# Patient Record
Sex: MALE | Race: WHITE | NOT HISPANIC OR LATINO | Employment: OTHER
[De-identification: names, ages, dates, MRNs, and addresses within clinical notes are randomized per-mention and may not be internally consistent; named-entity substitution may affect disease eponyms.]

---

## 2017-05-25 ENCOUNTER — LAB SERVICES (OUTPATIENT)
Dept: OTHER | Age: 82
End: 2017-05-25

## 2017-05-26 LAB
CHOLEST SERPL-MCNC: 171 MG/DL
CHOLEST/HDLC SERPL: 2.4
HDLC SERPL-MCNC: 72 MG/DL
LDLC SERPL CALC-MCNC: 74 MG/DL
LENGTH OF FAST TIME PATIENT: NORMAL HRS
NONHDLC SERPL-MCNC: 99 MG/DL
TRIGL SERPL-MCNC: 125 MG/DL

## 2017-06-01 ENCOUNTER — CHARTING TRANS (OUTPATIENT)
Dept: OTHER | Age: 82
End: 2017-06-01

## 2017-09-08 PROBLEM — Z85.51 HISTORY OF BLADDER CANCER: Status: ACTIVE | Noted: 2017-09-08

## 2017-09-08 PROCEDURE — 81015 MICROSCOPIC EXAM OF URINE: CPT | Performed by: UROLOGY

## 2017-10-03 ENCOUNTER — CHARTING TRANS (OUTPATIENT)
Dept: OTHER | Age: 82
End: 2017-10-03

## 2017-11-02 ENCOUNTER — HOSPITAL (OUTPATIENT)
Dept: OTHER | Age: 82
End: 2017-11-02
Attending: EMERGENCY MEDICINE

## 2018-01-08 PROBLEM — M75.81 RIGHT ROTATOR CUFF TENDONITIS: Status: ACTIVE | Noted: 2018-01-08

## 2018-01-08 PROBLEM — M75.122 COMPLETE TEAR OF LEFT ROTATOR CUFF: Status: ACTIVE | Noted: 2018-01-08

## 2018-01-22 ENCOUNTER — LAB SERVICES (OUTPATIENT)
Dept: OTHER | Age: 83
End: 2018-01-22

## 2018-01-23 ENCOUNTER — CHARTING TRANS (OUTPATIENT)
Dept: OTHER | Age: 83
End: 2018-01-23

## 2018-01-23 LAB
ALBUMIN SERPL-MCNC: 3.7 G/DL (ref 3.6–5.1)
ALBUMIN/GLOB SERPL: 1.2 (ref 1–2.4)
ALP SERPL-CCNC: 50 UNITS/L (ref 45–117)
ALT SERPL-CCNC: 24 UNITS/L
ANION GAP SERPL CALC-SCNC: 12 MMOL/L (ref 10–20)
AST SERPL-CCNC: 19 UNITS/L
BILIRUB SERPL-MCNC: 0.7 MG/DL (ref 0.2–1)
BUN SERPL-MCNC: 25 MG/DL (ref 6–20)
BUN/CREAT SERPL: 25 (ref 7–25)
CALCIUM SERPL-MCNC: 9.2 MG/DL (ref 8.4–10.2)
CHLORIDE SERPL-SCNC: 106 MMOL/L (ref 98–107)
CO2 SERPL-SCNC: 28 MMOL/L (ref 21–32)
CREAT SERPL-MCNC: 0.98 MG/DL (ref 0.67–1.17)
GLOBULIN SER-MCNC: 3.1 G/DL (ref 2–4)
GLUCOSE SERPL-MCNC: 101 MG/DL (ref 65–99)
LENGTH OF FAST TIME PATIENT: ABNORMAL HRS
POTASSIUM SERPL-SCNC: 4.5 MMOL/L (ref 3.4–5.1)
SODIUM SERPL-SCNC: 141 MMOL/L (ref 135–145)
TOTAL PROTEIN: 6.8 G/DL (ref 6.4–8.2)

## 2018-02-01 ENCOUNTER — CHARTING TRANS (OUTPATIENT)
Dept: OTHER | Age: 83
End: 2018-02-01

## 2018-04-04 ENCOUNTER — HOSPITAL (OUTPATIENT)
Dept: OTHER | Age: 83
End: 2018-04-04
Attending: EMERGENCY MEDICINE

## 2018-04-04 ENCOUNTER — LAB SERVICES (OUTPATIENT)
Dept: OTHER | Age: 83
End: 2018-04-04

## 2018-04-04 ENCOUNTER — IMAGING SERVICES (OUTPATIENT)
Dept: OTHER | Age: 83
End: 2018-04-04

## 2018-04-05 LAB
FLUAV AG SPEC QL IA: NEGATIVE
FLUBV AG SPEC QL IF: POSITIVE
SPECIMEN SOURCE: ABNORMAL

## 2018-07-03 ENCOUNTER — LAB SERVICES (OUTPATIENT)
Dept: OTHER | Age: 83
End: 2018-07-03

## 2018-07-03 ENCOUNTER — IMAGING SERVICES (OUTPATIENT)
Dept: OTHER | Age: 83
End: 2018-07-03

## 2018-07-03 ENCOUNTER — HOSPITAL (OUTPATIENT)
Dept: OTHER | Age: 83
End: 2018-07-03
Attending: INTERNAL MEDICINE

## 2018-07-03 ENCOUNTER — CHARTING TRANS (OUTPATIENT)
Dept: OTHER | Age: 83
End: 2018-07-03

## 2018-07-03 LAB
ALBUMIN SERPL-MCNC: 3.2 G/DL (ref 3.6–5.1)
ALBUMIN SERPL-MCNC: 3.2 GM/DL (ref 3.6–5.1)
ALBUMIN/GLOB SERPL: 1.1 (ref 1–2.4)
ALBUMIN/GLOB SERPL: 1.1 {RATIO} (ref 1–2.4)
ALP SERPL-CCNC: 36 UNIT/L (ref 45–117)
ALP SERPL-CCNC: 36 UNITS/L (ref 45–117)
ALT SERPL-CCNC: 27 UNIT/L
ALT SERPL-CCNC: 27 UNITS/L
ANALYZER ANC (IANC): ABNORMAL
ANALYZER ANC (IANC): ABNORMAL
ANION GAP SERPL CALC-SCNC: 10 MMOL/L (ref 10–20)
ANION GAP SERPL CALC-SCNC: 10 MMOL/L (ref 10–20)
APTT PPP: 21 SEC (ref 22–30)
APTT PPP: 21 SECONDS (ref 22–30)
APTT PPP: ABNORMAL
APTT PPP: ABNORMAL S
AST SERPL-CCNC: 36 UNIT/L
AST SERPL-CCNC: 36 UNITS/L
BASOPHILS # BLD: 0 K/MCL (ref 0–0.3)
BASOPHILS # BLD: 0 THOUSAND/MCL (ref 0–0.3)
BASOPHILS NFR BLD: 0 %
BASOPHILS NFR BLD: 0 %
BILIRUB SERPL-MCNC: 0.6 MG/DL (ref 0.2–1)
BILIRUB SERPL-MCNC: 0.6 MG/DL (ref 0.2–1)
BLOOD BANK CMNT PATIENT-IMP: NORMAL
BLOOD BANK CMNT PATIENT-IMP: NORMAL
BUN SERPL-MCNC: 22 MG/DL (ref 6–20)
BUN SERPL-MCNC: 22 MG/DL (ref 6–20)
BUN/CREAT SERPL: 22 (ref 7–25)
BUN/CREAT SERPL: 22 (ref 7–25)
CALCIUM SERPL-MCNC: 9 MG/DL (ref 8.4–10.2)
CALCIUM SERPL-MCNC: 9 MG/DL (ref 8.4–10.2)
CHLORIDE SERPL-SCNC: 105 MMOL/L (ref 98–107)
CHLORIDE: 105 MMOL/L (ref 98–107)
CO2 SERPL-SCNC: 27 MMOL/L (ref 21–32)
CO2 SERPL-SCNC: 27 MMOL/L (ref 21–32)
CREAT SERPL-MCNC: 1.01 MG/DL (ref 0.67–1.17)
CREAT SERPL-MCNC: 1.01 MG/DL (ref 0.67–1.17)
D DIMER PPP FEU-MCNC: 1.15 MG/L (FEU)
D DIMER PPP FEU-MCNC: 1.15 MG/L FEU
D DIMER PPP FEU-MCNC: 1.5 MG/L (FEU)
D DIMER PPP FEU-MCNC: 1.5 MG/L FEU
DIFFERENTIAL METHOD BLD: ABNORMAL
DIFFERENTIAL METHOD BLD: ABNORMAL
EOSINOPHIL # BLD: 0.1 K/MCL (ref 0.1–0.5)
EOSINOPHIL # BLD: 0.1 THOUSAND/MCL (ref 0.1–0.5)
EOSINOPHIL NFR BLD: 1 %
EOSINOPHIL NFR BLD: 1 %
ERYTHROCYTE [DISTWIDTH] IN BLOOD: 13.4 % (ref 11–15)
ERYTHROCYTE [DISTWIDTH] IN BLOOD: 13.4 % (ref 11–15)
GLOBULIN SER-MCNC: 3 G/DL (ref 2–4)
GLOBULIN SER-MCNC: 3 GM/DL (ref 2–4)
GLUCOSE SERPL-MCNC: 100 MG/DL (ref 65–99)
GLUCOSE SERPL-MCNC: 100 MG/DL (ref 65–99)
HEMATOCRIT: 19.2 % (ref 39–51)
HEMATOCRIT: 19.2 % (ref 39–51)
HEMATOCRIT: 19.9 % (ref 39–51)
HEMOGLOBIN: 6.7 G/DL (ref 13–17)
HGB BLD-MCNC: 6.7 GM/DL (ref 13–17)
HGB BLD-MCNC: 6.9 GM/DL (ref 13–17)
INR PPP: 1
INR PPP: 1
INR PPP: NORMAL
LYMPHOCYTES # BLD: 1.6 K/MCL (ref 1–4)
LYMPHOCYTES # BLD: 1.6 THOUSAND/MCL (ref 1–4)
LYMPHOCYTES NFR BLD: 21 %
LYMPHOCYTES NFR BLD: 21 %
MAGNESIUM SERPL-MCNC: 1.8 MG/DL (ref 1.7–2.4)
MAGNESIUM SERPL-MCNC: 1.8 MG/DL (ref 1.7–2.4)
MCH RBC QN AUTO: 32.8 PG (ref 26–34)
MCHC RBC AUTO-ENTMCNC: 34.9 GM/DL (ref 32–36.5)
MCV RBC AUTO: 94.1 FL (ref 78–100)
MEAN CORPUSCULAR HEMOGLOBIN: 32.8 PG (ref 26–34)
MEAN CORPUSCULAR HGB CONC: 34.9 G/DL (ref 32–36.5)
MEAN CORPUSCULAR VOLUME: 94.1 FL (ref 78–100)
MONOCYTES # BLD: 0.6 K/MCL (ref 0.3–0.9)
MONOCYTES # BLD: 0.6 THOUSAND/MCL (ref 0.3–0.9)
MONOCYTES NFR BLD: 8 %
MONOCYTES NFR BLD: 8 %
NEUTROPHILS # BLD: 5.2 K/MCL (ref 1.8–7.7)
NEUTROPHILS # BLD: 5.2 THOUSAND/MCL (ref 1.8–7.7)
NEUTROPHILS NFR BLD: 70 %
NEUTROPHILS NFR BLD: 70 %
NEUTS SEG NFR BLD: ABNORMAL
NEUTS SEG NFR BLD: ABNORMAL %
NRBC (NRBCRE): 0 /100 WBC
NRBC (NRBCRE): 0 /100 WBC
NUM BPU REQUESTED: 1
PLATELET # BLD: 206 THOUSAND/MCL (ref 140–450)
PLATELET COUNT: 206 K/MCL (ref 140–450)
POTASSIUM SERPL-SCNC: 4.4 MMOL/L (ref 3.4–5.1)
POTASSIUM SERPL-SCNC: 4.4 MMOL/L (ref 3.4–5.1)
PROT SERPL-MCNC: 6.2 GM/DL (ref 6.4–8.2)
PROTHROMBIN TIME (PRT2): NORMAL
PROTHROMBIN TIME (PRT2): NORMAL SEC (ref 9.7–11.8)
PROTHROMBIN TIME: 10.2 SEC (ref 9.7–11.8)
PROTHROMBIN TIME: 10.2 SECONDS (ref 9.7–11.8)
PROTHROMBIN TIME: NORMAL
PROTHROMBIN TIME: NORMAL SEC (ref 9.7–11.8)
RBC # BLD: 2.04 MILLION/MCL (ref 4.5–5.9)
RED CELL COUNT: 2.04 MIL/MCL (ref 4.5–5.9)
SODIUM SERPL-SCNC: 138 MMOL/L (ref 135–145)
SODIUM SERPL-SCNC: 138 MMOL/L (ref 135–145)
TOTAL PROTEIN: 6.2 G/DL (ref 6.4–8.2)
TROPONIN I SERPL HS-MCNC: 0.02 NG/ML
TROPONIN I SERPL HS-MCNC: 0.02 NG/ML
TROPONIN I SERPL HS-MCNC: 0.21 NG/ML
TROPONIN I SERPL HS-MCNC: 0.21 NG/ML
WBC # BLD: 7.5 THOUSAND/MCL (ref 4.2–11)
WHITE BLOOD COUNT: 7.5 K/MCL (ref 4.2–11)

## 2018-07-04 ENCOUNTER — LAB SERVICES (OUTPATIENT)
Dept: OTHER | Age: 83
End: 2018-07-04

## 2018-07-04 ENCOUNTER — CHARTING TRANS (OUTPATIENT)
Dept: OTHER | Age: 83
End: 2018-07-04

## 2018-07-04 LAB
ANALYZER ANC (IANC): ABNORMAL
ANALYZER ANC (IANC): ABNORMAL
ANION GAP SERPL CALC-SCNC: 10 MMOL/L (ref 10–20)
ANION GAP SERPL CALC-SCNC: 10 MMOL/L (ref 10–20)
BASOPHILS # BLD: 0 K/MCL (ref 0–0.3)
BASOPHILS # BLD: 0 THOUSAND/MCL (ref 0–0.3)
BASOPHILS NFR BLD: 0 %
BASOPHILS NFR BLD: 0 %
BUN SERPL-MCNC: 18 MG/DL (ref 6–20)
BUN SERPL-MCNC: 18 MG/DL (ref 6–20)
BUN/CREAT SERPL: 19 (ref 7–25)
BUN/CREAT SERPL: 19 (ref 7–25)
CALCIUM SERPL-MCNC: 8.3 MG/DL (ref 8.4–10.2)
CALCIUM SERPL-MCNC: 8.3 MG/DL (ref 8.4–10.2)
CHLORIDE SERPL-SCNC: 109 MMOL/L (ref 98–107)
CHLORIDE: 109 MMOL/L (ref 98–107)
CO2 SERPL-SCNC: 26 MMOL/L (ref 21–32)
CO2 SERPL-SCNC: 26 MMOL/L (ref 21–32)
CREAT SERPL-MCNC: 0.96 MG/DL (ref 0.67–1.17)
CREAT SERPL-MCNC: 0.96 MG/DL (ref 0.67–1.17)
DIFFERENTIAL METHOD BLD: ABNORMAL
DIFFERENTIAL METHOD BLD: ABNORMAL
EOSINOPHIL # BLD: 0.2 K/MCL (ref 0.1–0.5)
EOSINOPHIL # BLD: 0.2 THOUSAND/MCL (ref 0.1–0.5)
EOSINOPHIL NFR BLD: 3 %
EOSINOPHIL NFR BLD: 3 %
ERYTHROCYTE [DISTWIDTH] IN BLOOD: 14.5 % (ref 11–15)
ERYTHROCYTE [DISTWIDTH] IN BLOOD: 14.5 % (ref 11–15)
GLUCOSE SERPL-MCNC: 95 MG/DL (ref 65–99)
GLUCOSE SERPL-MCNC: 95 MG/DL (ref 65–99)
HEMATOCRIT: 21.6 % (ref 39–51)
HEMATOCRIT: 21.6 % (ref 39–51)
HEMOGLOBIN: 7.5 G/DL (ref 13–17)
HGB BLD-MCNC: 7.5 GM/DL (ref 13–17)
LYMPHOCYTES # BLD: 1.6 K/MCL (ref 1–4)
LYMPHOCYTES # BLD: 1.6 THOUSAND/MCL (ref 1–4)
LYMPHOCYTES NFR BLD: 28 %
LYMPHOCYTES NFR BLD: 28 %
MCH RBC QN AUTO: 32.3 PG (ref 26–34)
MCHC RBC AUTO-ENTMCNC: 34.7 GM/DL (ref 32–36.5)
MCV RBC AUTO: 93.1 FL (ref 78–100)
MEAN CORPUSCULAR HEMOGLOBIN: 32.3 PG (ref 26–34)
MEAN CORPUSCULAR HGB CONC: 34.7 G/DL (ref 32–36.5)
MEAN CORPUSCULAR VOLUME: 93.1 FL (ref 78–100)
MONOCYTES # BLD: 0.5 K/MCL (ref 0.3–0.9)
MONOCYTES # BLD: 0.5 THOUSAND/MCL (ref 0.3–0.9)
MONOCYTES NFR BLD: 9 %
MONOCYTES NFR BLD: 9 %
NEUTROPHILS # BLD: 3.5 K/MCL (ref 1.8–7.7)
NEUTROPHILS # BLD: 3.5 THOUSAND/MCL (ref 1.8–7.7)
NEUTROPHILS NFR BLD: 60 %
NEUTROPHILS NFR BLD: 60 %
NEUTS SEG NFR BLD: ABNORMAL
NEUTS SEG NFR BLD: ABNORMAL %
NRBC (NRBCRE): ABNORMAL
NRBC (NRBCRE): ABNORMAL
PLATELET # BLD: 146 THOUSAND/MCL (ref 140–450)
PLATELET COUNT: 146 K/MCL (ref 140–450)
POTASSIUM SERPL-SCNC: 4 MMOL/L (ref 3.4–5.1)
POTASSIUM SERPL-SCNC: 4 MMOL/L (ref 3.4–5.1)
RBC # BLD: 2.32 MILLION/MCL (ref 4.5–5.9)
RED CELL COUNT: 2.32 MIL/MCL (ref 4.5–5.9)
SODIUM SERPL-SCNC: 141 MMOL/L (ref 135–145)
SODIUM SERPL-SCNC: 141 MMOL/L (ref 135–145)
TROPONIN I SERPL HS-MCNC: 0.33 NG/ML
TROPONIN I SERPL HS-MCNC: 0.33 NG/ML
WBC # BLD: 5.8 THOUSAND/MCL (ref 4.2–11)
WHITE BLOOD COUNT: 5.8 K/MCL (ref 4.2–11)

## 2018-07-05 ENCOUNTER — DIAGNOSTIC TRANS (OUTPATIENT)
Dept: OTHER | Age: 83
End: 2018-07-05

## 2018-07-05 ENCOUNTER — IMAGING SERVICES (OUTPATIENT)
Dept: OTHER | Age: 83
End: 2018-07-05

## 2018-07-05 LAB
ABO + RH BLD: NORMAL
BLD GP AB SCN SERPL QL: NEGATIVE
BLD PROD TYP BPU: NORMAL
BLD UNIT ID BPU: NORMAL
CREAT SERPL-MCNC: 0.86 MG/DL (ref 0.67–1.17)
CROSSMATCH EXPIRE: NORMAL
HEMATOCRIT: 25.8 % (ref 39–51)
HGB BLD-MCNC: 8.9 GM/DL (ref 13–17)
MAGNESIUM SERPL-MCNC: 1.9 MG/DL (ref 1.7–2.4)
MAJ XM SERPL-IMP: NORMAL
NUM BPU REQUESTED: 3
POTASSIUM SERPL-SCNC: 4 MMOL/L (ref 3.4–5.1)
STATUS OF UNIT: NORMAL
TRANSFUSION STATUS: NORMAL
UNIT DIVISION: 0

## 2018-07-10 ENCOUNTER — IMAGING SERVICES (OUTPATIENT)
Dept: OTHER | Age: 83
End: 2018-07-10

## 2018-07-10 ENCOUNTER — HOSPITAL (OUTPATIENT)
Dept: OTHER | Age: 83
End: 2018-07-10
Attending: FAMILY MEDICINE

## 2018-07-12 ENCOUNTER — LAB SERVICES (OUTPATIENT)
Dept: OTHER | Age: 83
End: 2018-07-12

## 2018-07-16 LAB
ANION GAP SERPL CALC-SCNC: 14 MMOL/L (ref 10–20)
BUN SERPL-MCNC: 17 MG/DL (ref 6–20)
BUN/CREAT SERPL: 16 (ref 7–25)
CALCIUM SERPL-MCNC: 9.2 MG/DL (ref 8.4–10.2)
CHLORIDE SERPL-SCNC: 105 MMOL/L (ref 98–107)
CHOLEST SERPL-MCNC: 140 MG/DL
CHOLEST/HDLC SERPL: 2.3
CO2 SERPL-SCNC: 27 MMOL/L (ref 21–32)
CREAT SERPL-MCNC: 1.08 MG/DL (ref 0.67–1.17)
GLUCOSE SERPL-MCNC: 103 MG/DL (ref 65–99)
HDLC SERPL-MCNC: 62 MG/DL
HEMATOCRIT: 32.1 % (ref 39–51)
HEMOGLOBIN: 10.4 G/DL (ref 13–17)
LDLC SERPL CALC-MCNC: 62 MG/DL
LENGTH OF FAST TIME PATIENT: 12 HRS
LENGTH OF FAST TIME PATIENT: NORMAL HRS
NONHDLC SERPL-MCNC: 78 MG/DL
POTASSIUM SERPL-SCNC: 4.7 MMOL/L (ref 3.4–5.1)
SODIUM SERPL-SCNC: 141 MMOL/L (ref 135–145)
TRIGL SERPL-MCNC: 78 MG/DL

## 2018-07-17 ENCOUNTER — HOSPITAL (OUTPATIENT)
Dept: OTHER | Age: 83
End: 2018-07-17
Attending: INTERNAL MEDICINE

## 2018-07-17 ENCOUNTER — IMAGING SERVICES (OUTPATIENT)
Dept: OTHER | Age: 83
End: 2018-07-17

## 2018-07-24 ENCOUNTER — MYAURORA ACCOUNT LINK (OUTPATIENT)
Dept: OTHER | Age: 83
End: 2018-07-24

## 2018-07-24 ENCOUNTER — CHARTING TRANS (OUTPATIENT)
Dept: OTHER | Age: 83
End: 2018-07-24

## 2018-07-29 ENCOUNTER — CHARTING TRANS (OUTPATIENT)
Dept: OTHER | Age: 83
End: 2018-07-29

## 2018-07-31 ENCOUNTER — HOSPITAL (OUTPATIENT)
Dept: OTHER | Age: 83
End: 2018-07-31
Attending: FAMILY MEDICINE

## 2018-07-31 ENCOUNTER — IMAGING SERVICES (OUTPATIENT)
Dept: OTHER | Age: 83
End: 2018-07-31

## 2018-08-30 PROBLEM — E78.2 MIXED HYPERLIPIDEMIA: Status: ACTIVE | Noted: 2018-08-30

## 2018-08-31 ENCOUNTER — HOSPITAL (OUTPATIENT)
Dept: OTHER | Age: 83
End: 2018-08-31
Attending: INTERNAL MEDICINE

## 2018-09-04 ENCOUNTER — HOSPITAL (OUTPATIENT)
Dept: OTHER | Age: 83
End: 2018-09-04
Attending: INTERNAL MEDICINE

## 2018-09-04 ENCOUNTER — CHARTING TRANS (OUTPATIENT)
Dept: OTHER | Age: 83
End: 2018-09-04

## 2018-09-07 ENCOUNTER — HOSPITAL (OUTPATIENT)
Dept: OTHER | Age: 83
End: 2018-09-07
Attending: INTERNAL MEDICINE

## 2018-09-12 ENCOUNTER — HOSPITAL (OUTPATIENT)
Dept: OTHER | Age: 83
End: 2018-09-12
Attending: INTERNAL MEDICINE

## 2018-09-12 LAB
ANALYZER ANC (IANC): ABNORMAL
ERYTHROCYTE [DISTWIDTH] IN BLOOD: 13.7 % (ref 11–15)
HEMATOCRIT: 35.6 % (ref 39–51)
HGB BLD-MCNC: 11.5 GM/DL (ref 13–17)
IRON SATN MFR SERPL: 21 % (ref 15–45)
IRON SERPL-MCNC: 81 MCG/DL (ref 65–175)
MCH RBC QN AUTO: 29.4 PG (ref 26–34)
MCHC RBC AUTO-ENTMCNC: 32.3 GM/DL (ref 32–36.5)
MCV RBC AUTO: 91 FL (ref 78–100)
NRBC (NRBCRE): ABNORMAL
PLATELET # BLD: 232 THOUSAND/MCL (ref 140–450)
RBC # BLD: 3.91 MILLION/MCL (ref 4.5–5.9)
TIBC SERPL-MCNC: 380 MCG/DL (ref 250–450)
WBC # BLD: 6.4 THOUSAND/MCL (ref 4.2–11)

## 2018-10-02 ENCOUNTER — HOSPITAL (OUTPATIENT)
Dept: OTHER | Age: 83
End: 2018-10-02
Attending: INTERNAL MEDICINE

## 2018-10-02 LAB
ANION GAP SERPL CALC-SCNC: 12 MMOL/L (ref 10–20)
BUN SERPL-MCNC: 19 MG/DL (ref 6–20)
BUN/CREAT SERPL: 20 (ref 7–25)
CALCIUM SERPL-MCNC: 9.2 MG/DL (ref 8.4–10.2)
CHLORIDE: 106 MMOL/L (ref 98–107)
CO2 SERPL-SCNC: 29 MMOL/L (ref 21–32)
CREAT SERPL-MCNC: 0.95 MG/DL (ref 0.67–1.17)
GLUCOSE SERPL-MCNC: 104 MG/DL (ref 65–99)
LENGTH OF FAST TIME PATIENT: ABNORMAL HR
POTASSIUM SERPL-SCNC: 5.1 MMOL/L (ref 3.4–5.1)
SODIUM SERPL-SCNC: 142 MMOL/L (ref 135–145)

## 2018-10-04 ENCOUNTER — CHARTING TRANS (OUTPATIENT)
Dept: OTHER | Age: 83
End: 2018-10-04

## 2018-10-09 ENCOUNTER — HOSPITAL (OUTPATIENT)
Dept: OTHER | Age: 83
End: 2018-10-09
Attending: INTERNAL MEDICINE

## 2018-10-09 ENCOUNTER — IMAGING SERVICES (OUTPATIENT)
Dept: OTHER | Age: 83
End: 2018-10-09

## 2018-10-31 VITALS
WEIGHT: 175.04 LBS | HEIGHT: 64 IN | DIASTOLIC BLOOD PRESSURE: 72 MMHG | TEMPERATURE: 98.3 F | BODY MASS INDEX: 29.88 KG/M2 | SYSTOLIC BLOOD PRESSURE: 130 MMHG | HEART RATE: 64 BPM | RESPIRATION RATE: 16 BRPM

## 2018-11-02 VITALS
BODY MASS INDEX: 31.01 KG/M2 | WEIGHT: 175 LBS | SYSTOLIC BLOOD PRESSURE: 140 MMHG | RESPIRATION RATE: 16 BRPM | DIASTOLIC BLOOD PRESSURE: 82 MMHG | OXYGEN SATURATION: 93 % | HEART RATE: 75 BPM | HEIGHT: 63 IN | TEMPERATURE: 97.5 F

## 2018-11-03 VITALS
DIASTOLIC BLOOD PRESSURE: 68 MMHG | SYSTOLIC BLOOD PRESSURE: 126 MMHG | TEMPERATURE: 97.8 F | HEIGHT: 64 IN | HEART RATE: 72 BPM | WEIGHT: 194 LBS | BODY MASS INDEX: 33.12 KG/M2 | RESPIRATION RATE: 16 BRPM

## 2018-11-19 ENCOUNTER — IMAGING SERVICES (OUTPATIENT)
Dept: OTHER | Age: 83
End: 2018-11-19

## 2018-11-19 ENCOUNTER — HOSPITAL (OUTPATIENT)
Dept: OTHER | Age: 83
End: 2018-11-19
Attending: INTERNAL MEDICINE

## 2018-12-09 RX ORDER — RAMIPRIL 5 MG/1
CAPSULE ORAL
COMMUNITY
End: 2019-04-30 | Stop reason: CLARIF

## 2018-12-09 RX ORDER — METOPROLOL SUCCINATE 25 MG/1
25 TABLET, EXTENDED RELEASE ORAL EVERY MORNING
Status: ON HOLD | COMMUNITY
End: 2023-08-18 | Stop reason: HOSPADM

## 2018-12-09 RX ORDER — GABAPENTIN 300 MG/1
CAPSULE ORAL DAILY
COMMUNITY
Start: 2018-03-03 | End: 2018-12-10 | Stop reason: CLARIF

## 2018-12-09 RX ORDER — CLOPIDOGREL BISULFATE 75 MG/1
TABLET ORAL
COMMUNITY
End: 2023-01-31 | Stop reason: SDUPTHER

## 2018-12-09 RX ORDER — HYDROCODONE BITARTRATE AND ACETAMINOPHEN 10; 325 MG/1; MG/1
TABLET ORAL
COMMUNITY
Start: 2015-05-20 | End: 2019-04-30 | Stop reason: CLARIF

## 2018-12-09 RX ORDER — ATORVASTATIN CALCIUM 40 MG/1
TABLET, FILM COATED ORAL
COMMUNITY
Start: 2018-02-06 | End: 2019-01-16 | Stop reason: SDUPTHER

## 2018-12-09 RX ORDER — DOXYCYCLINE HYCLATE 100 MG
TABLET ORAL
COMMUNITY
Start: 2015-06-12 | End: 2023-01-31 | Stop reason: ALTCHOICE

## 2018-12-09 RX ORDER — AMLODIPINE BESYLATE 5 MG/1
TABLET ORAL
COMMUNITY
End: 2023-01-31 | Stop reason: SDUPTHER

## 2018-12-09 RX ORDER — TAMSULOSIN HYDROCHLORIDE 0.4 MG/1
0.4 CAPSULE ORAL EVERY EVENING
COMMUNITY
Start: 2015-04-02

## 2018-12-10 RX ORDER — GABAPENTIN 300 MG/1
CAPSULE ORAL
Qty: 90 CAPSULE | Refills: 1 | Status: SHIPPED | OUTPATIENT
Start: 2018-12-10 | End: 2019-06-01 | Stop reason: SDUPTHER

## 2018-12-17 PROBLEM — Z86.79 ATRIAL FIBRILLATION, CURRENTLY IN SINUS RHYTHM: Status: ACTIVE | Noted: 2018-12-17

## 2019-01-01 ENCOUNTER — EXTERNAL RECORD (OUTPATIENT)
Dept: HEALTH INFORMATION MANAGEMENT | Facility: OTHER | Age: 84
End: 2019-01-01

## 2019-01-10 ENCOUNTER — TELEPHONE (OUTPATIENT)
Dept: SCHEDULING | Age: 84
End: 2019-01-10

## 2019-01-16 DIAGNOSIS — E78.2 HYPERLIPEMIA, MIXED: Primary | ICD-10-CM

## 2019-01-17 RX ORDER — ATORVASTATIN CALCIUM 40 MG/1
TABLET, FILM COATED ORAL
Qty: 90 TABLET | Refills: 3 | Status: SHIPPED | OUTPATIENT
Start: 2019-01-17 | End: 2020-04-01

## 2019-02-14 ENCOUNTER — TELEPHONE (OUTPATIENT)
Dept: SCHEDULING | Age: 84
End: 2019-02-14

## 2019-02-21 ENCOUNTER — HOSPITAL (OUTPATIENT)
Dept: OTHER | Age: 84
End: 2019-02-21
Attending: INTERNAL MEDICINE

## 2019-02-25 ENCOUNTER — HOSPITAL (OUTPATIENT)
Dept: OTHER | Age: 84
End: 2019-02-25
Attending: INTERNAL MEDICINE

## 2019-04-09 ENCOUNTER — TELEPHONE (OUTPATIENT)
Dept: SCHEDULING | Age: 84
End: 2019-04-09

## 2019-04-16 ENCOUNTER — APPOINTMENT (OUTPATIENT)
Dept: FAMILY MEDICINE | Age: 84
End: 2019-04-16

## 2019-04-30 ENCOUNTER — OFFICE VISIT (OUTPATIENT)
Dept: FAMILY MEDICINE | Age: 84
End: 2019-04-30

## 2019-04-30 VITALS
SYSTOLIC BLOOD PRESSURE: 130 MMHG | TEMPERATURE: 97.8 F | DIASTOLIC BLOOD PRESSURE: 74 MMHG | BODY MASS INDEX: 33.31 KG/M2 | HEART RATE: 72 BPM | RESPIRATION RATE: 16 BRPM | WEIGHT: 181 LBS | HEIGHT: 62 IN

## 2019-04-30 DIAGNOSIS — D50.0 ANEMIA, BLOOD LOSS: ICD-10-CM

## 2019-04-30 DIAGNOSIS — M19.90 ARTHRITIS: ICD-10-CM

## 2019-04-30 DIAGNOSIS — I10 BENIGN ESSENTIAL HYPERTENSION: Primary | ICD-10-CM

## 2019-04-30 DIAGNOSIS — M25.50 ARTHRALGIA, UNSPECIFIED JOINT: ICD-10-CM

## 2019-04-30 PROBLEM — Z86.79 ATRIAL FIBRILLATION, CURRENTLY IN SINUS RHYTHM: Status: ACTIVE | Noted: 2018-12-17

## 2019-04-30 PROCEDURE — 99213 OFFICE O/P EST LOW 20 MIN: CPT | Performed by: FAMILY MEDICINE

## 2019-04-30 ASSESSMENT — ENCOUNTER SYMPTOMS
ACTIVITY CHANGE: 0
NEUROLOGICAL NEGATIVE: 1

## 2019-06-02 RX ORDER — GABAPENTIN 300 MG/1
CAPSULE ORAL
Qty: 90 CAPSULE | Refills: 1 | Status: SHIPPED | OUTPATIENT
Start: 2019-06-02 | End: 2019-12-03 | Stop reason: SDUPTHER

## 2019-06-14 ENCOUNTER — TELEPHONE (OUTPATIENT)
Dept: SCHEDULING | Age: 84
End: 2019-06-14

## 2019-07-01 ENCOUNTER — HOSPITAL (OUTPATIENT)
Dept: OTHER | Age: 84
End: 2019-07-01
Attending: FAMILY MEDICINE

## 2019-08-26 ENCOUNTER — OFFICE VISIT (OUTPATIENT)
Dept: SURGERY | Facility: CLINIC | Age: 84
End: 2019-08-26
Payer: MEDICARE

## 2019-08-26 ENCOUNTER — TELEPHONE (OUTPATIENT)
Dept: SURGERY | Facility: CLINIC | Age: 84
End: 2019-08-26

## 2019-08-26 VITALS
RESPIRATION RATE: 16 BRPM | DIASTOLIC BLOOD PRESSURE: 80 MMHG | HEIGHT: 64 IN | TEMPERATURE: 98 F | BODY MASS INDEX: 30.39 KG/M2 | OXYGEN SATURATION: 98 % | SYSTOLIC BLOOD PRESSURE: 143 MMHG | WEIGHT: 178 LBS | HEART RATE: 61 BPM

## 2019-08-26 DIAGNOSIS — Z86.2 HISTORY OF ANEMIA: ICD-10-CM

## 2019-08-26 DIAGNOSIS — R39.15 URGENCY OF URINATION: ICD-10-CM

## 2019-08-26 DIAGNOSIS — Z85.51 HISTORY OF BLADDER CANCER: Primary | ICD-10-CM

## 2019-08-26 DIAGNOSIS — R82.90 UNSPECIFIED ABNORMAL FINDINGS IN URINE: ICD-10-CM

## 2019-08-26 LAB
APPEARANCE: CLEAR
MULTISTIX LOT#: NORMAL NUMERIC
PH, URINE: 7 (ref 4.5–8)
SPECIFIC GRAVITY: 1.01 (ref 1–1.03)
URINE-COLOR: YELLOW
UROBILINOGEN,SEMI-QN: 0.2 MG/DL (ref 0–1.9)

## 2019-08-26 PROCEDURE — 81003 URINALYSIS AUTO W/O SCOPE: CPT | Performed by: UROLOGY

## 2019-08-26 PROCEDURE — 51798 US URINE CAPACITY MEASURE: CPT | Performed by: UROLOGY

## 2019-08-26 PROCEDURE — 99213 OFFICE O/P EST LOW 20 MIN: CPT | Performed by: UROLOGY

## 2019-08-26 PROCEDURE — 52000 CYSTOURETHROSCOPY: CPT | Performed by: UROLOGY

## 2019-08-26 RX ORDER — CIPROFLOXACIN 500 MG/1
500 TABLET, FILM COATED ORAL ONCE
Qty: 1 TABLET | Refills: 0 | Status: CANCELLED | OUTPATIENT
Start: 2019-08-26 | End: 2019-08-26

## 2019-08-26 RX ORDER — CIPROFLOXACIN 500 MG/1
500 TABLET, FILM COATED ORAL ONCE
Status: SHIPPED | OUTPATIENT
Start: 2019-08-26

## 2019-08-26 NOTE — PROGRESS NOTES
Rooming Clinician:     Caden Mott is a 82year old male. Patient presents with:  Bladder Cancer: hx of bladder ca <5 year ago. is scheduled for a cysto in September 2019.       She has a history of superficial non-muscle invasive urothelial car RASH   Past Medical History:   Diagnosis Date   • AAA (abdominal aortic aneurysm) ([facility] 5/19/2015   • Arthritis    • Atherosclerosis of native artery of extremity ([facility] 5/19/2015   • CAD in native artery 5/19/2015   • Carotid artery stenosis, unspecified l Cystoscopy- Dr. Carol Pickett    • OTHER SURGICAL HISTORY  09/08/2017, 3/30/18    Cysto w/ Dr. Raman Ellison     • [address] Right 9/9/2014    Performed by Valentina Good DO at [address] [place] [address],Floors 3,4, & 5 the examining table in the supply in position. The urethral meatus was prepped with Betadine and 10 mL of 2% viscose Xylocaine was injected into the urethra for topical anesthesia.   After an appropriate period of time the flexible cystoscope was then intro

## 2019-08-27 ENCOUNTER — HOSPITAL (OUTPATIENT)
Dept: OTHER | Age: 84
End: 2019-08-27
Attending: UROLOGY

## 2019-08-27 LAB
ANALYZER ANC (IANC): ABNORMAL
BASOPHILS # BLD: 0 K/MCL (ref 0–0.3)
BASOPHILS NFR BLD: 1 %
DIFFERENTIAL METHOD BLD: ABNORMAL
EOSINOPHIL # BLD: 0.1 K/MCL (ref 0.1–0.5)
EOSINOPHIL NFR BLD: 2 %
ERYTHROCYTE [DISTWIDTH] IN BLOOD: 12.3 % (ref 11–15)
HCT VFR BLD CALC: 38.6 % (ref 39–51)
HGB BLD-MCNC: 13.1 G/DL (ref 13–17)
IMM GRANULOCYTES # BLD AUTO: 0 K/MCL (ref 0–0.2)
IMM GRANULOCYTES NFR BLD: 0 %
LYMPHOCYTES # BLD: 1.6 K/MCL (ref 1–4)
LYMPHOCYTES NFR BLD: 27 %
MCH RBC QN AUTO: 32 PG (ref 26–34)
MCHC RBC AUTO-ENTMCNC: 33.9 G/DL (ref 32–36.5)
MCV RBC AUTO: 94.1 FL (ref 78–100)
MONOCYTES # BLD: 0.6 K/MCL (ref 0.3–0.9)
MONOCYTES NFR BLD: 10 %
NEUTROPHILS # BLD: 3.6 K/MCL (ref 1.8–7.7)
NEUTROPHILS NFR BLD: 60 %
NEUTS SEG NFR BLD: ABNORMAL %
NRBC (NRBCRE): 0 /100 WBC
PLATELET # BLD: 222 K/MCL (ref 140–450)
RBC # BLD: 4.1 MIL/MCL (ref 4.5–5.9)
WBC # BLD: 6 K/MCL (ref 4.2–11)

## 2019-08-29 ENCOUNTER — TELEPHONE (OUTPATIENT)
Dept: SURGERY | Facility: CLINIC | Age: 84
End: 2019-08-29

## 2019-08-29 NOTE — TELEPHONE ENCOUNTER
Pt called and given negative cytology results per Dr Nicci Kelley. Pt confirmed he has a f/u apt in 1 year.

## 2019-09-03 ENCOUNTER — TELEPHONE (OUTPATIENT)
Dept: SURGERY | Facility: CLINIC | Age: 84
End: 2019-09-03

## 2019-09-03 NOTE — TELEPHONE ENCOUNTER
Called pt with Hgb level from [facility] = \"13.1 - Good\" and \"follow up urology as scheduled\" as stated by dr vazquez. Pt did not answer.   No message left as there was not an answering machine and phone consent details to \"Exclude lab/test res

## 2019-09-05 ENCOUNTER — OFFICE VISIT (OUTPATIENT)
Dept: PODIATRY CLINIC | Facility: CLINIC | Age: 84
End: 2019-09-05
Payer: MEDICARE

## 2019-09-05 DIAGNOSIS — M19.90 ARTHRITIS: ICD-10-CM

## 2019-09-05 DIAGNOSIS — M20.11 HALLUX VALGUS OF RIGHT FOOT: ICD-10-CM

## 2019-09-05 DIAGNOSIS — M79.672 LEFT FOOT PAIN: Primary | ICD-10-CM

## 2019-09-05 PROCEDURE — 99213 OFFICE O/P EST LOW 20 MIN: CPT | Performed by: PODIATRIST

## 2019-09-05 NOTE — TELEPHONE ENCOUNTER
Spoke with patient and told him per Dr Tommy Avery that his Hgb is now 13.1- Good, Follow up with urology as planned. Verbalized understanding.

## 2019-09-06 NOTE — PROGRESS NOTES
Monique Moody is a 82year old male. Patient presents with: Foot Pain: Arthritis bilateral feet - left foot worst than right - it gets worst after sitting for a while -  can't tolerate dress shoes they feel better with golf shoes.         HPI:     Pa (thoracic) vertebra without mention of spinal cord injury 6/20/2013    Log Date: 10/30/2012    • DJD (degenerative joint disease), ankle and foot 6/27/2014   • Enthesopathy of hip region 8/1/2014   • Heart disease    • History of chest pain    • History of Social History    Socioeconomic History      Marital status:       Spouse name: Not on file      Number of children: Not on file      Years of education: Not on file      Highest education level: Not on file    Tobacco Use      Smoking status: For temporary relief. ASSESSMENT AND PLAN:   Diagnoses and all orders for this visit:    Left foot pain  -     XR FOOT WEIGHTBEARING (3 VIEWS), LEFT (CPT=73630)    Arthritis    Hallux valgus of right foot        Plan:  Today we discussed various treatmen

## 2019-10-05 ENCOUNTER — TELEPHONE (OUTPATIENT)
Dept: SCHEDULING | Age: 84
End: 2019-10-05

## 2019-10-07 ENCOUNTER — OFFICE VISIT (OUTPATIENT)
Dept: FAMILY MEDICINE | Age: 84
End: 2019-10-07

## 2019-10-07 DIAGNOSIS — Z23 FLU VACCINE NEED: Primary | ICD-10-CM

## 2019-10-07 PROCEDURE — G0008 ADMIN INFLUENZA VIRUS VAC: HCPCS

## 2019-10-07 PROCEDURE — 90662 IIV NO PRSV INCREASED AG IM: CPT

## 2019-10-21 PROBLEM — M12.812 LEFT ROTATOR CUFF TEAR ARTHROPATHY: Status: ACTIVE | Noted: 2019-10-21

## 2019-10-21 PROBLEM — M75.102 LEFT ROTATOR CUFF TEAR ARTHROPATHY: Status: ACTIVE | Noted: 2019-10-21

## 2019-10-21 PROBLEM — M75.122 NONTRAUMATIC COMPLETE TEAR OF LEFT ROTATOR CUFF: Status: ACTIVE | Noted: 2019-10-21

## 2019-11-26 ENCOUNTER — TELEPHONE (OUTPATIENT)
Dept: SURGERY | Facility: CLINIC | Age: 84
End: 2019-11-26

## 2019-11-26 NOTE — TELEPHONE ENCOUNTER
Per Masood from [facility], is calling in regards to the prescription request they sent over on Nov 19 and Nov 21 for TAMSULOSIN HCL 0.4 MG Oral Cap.  Please advise

## 2019-12-03 NOTE — TELEPHONE ENCOUNTER
Patient is calling in regards to medication refill. Stating he has not gotten a call back to let him know if it is available, has not gotten a call from [facility] either to let him know it is ready for .  Please advise

## 2019-12-04 RX ORDER — GABAPENTIN 300 MG/1
CAPSULE ORAL
Qty: 90 CAPSULE | Refills: 1 | Status: SHIPPED | OUTPATIENT
Start: 2019-12-04 | End: 2020-06-08

## 2019-12-05 ENCOUNTER — OFFICE VISIT (OUTPATIENT)
Dept: PODIATRY CLINIC | Facility: CLINIC | Age: 84
End: 2019-12-05
Payer: MEDICARE

## 2019-12-05 VITALS — SYSTOLIC BLOOD PRESSURE: 136 MMHG | DIASTOLIC BLOOD PRESSURE: 62 MMHG | HEART RATE: 47 BPM

## 2019-12-05 DIAGNOSIS — M20.11 HALLUX VALGUS OF RIGHT FOOT: ICD-10-CM

## 2019-12-05 DIAGNOSIS — M79.672 LEFT FOOT PAIN: Primary | ICD-10-CM

## 2019-12-05 DIAGNOSIS — M19.90 ARTHRITIS: ICD-10-CM

## 2019-12-05 PROCEDURE — 20604 DRAIN/INJ JOINT/BURSA W/US: CPT | Performed by: PODIATRIST

## 2019-12-05 RX ORDER — DEXAMETHASONE SODIUM PHOSPHATE 4 MG/ML
2 INJECTION, SOLUTION INTRA-ARTICULAR; INTRALESIONAL; INTRAMUSCULAR; INTRAVENOUS; SOFT TISSUE ONCE
Status: COMPLETED | OUTPATIENT
Start: 2019-12-05 | End: 2019-12-05

## 2019-12-05 RX ORDER — GABAPENTIN 300 MG/1
1 CAPSULE ORAL NIGHTLY
COMMUNITY
Start: 2019-12-04

## 2019-12-05 NOTE — PROGRESS NOTES
Vivek Harrington is a 82year old male. Patient presents with: Follow - Up: Left foot pain, 7/10 for pain. Patient is here for injection. HPI:     The patient presents today for evaluation of his left foot pain.   He has pain in the midfoot comp Hypercholesterolemia    • Hyperlipidemia    • Hypertension    • Hypertension, benign 5/19/2015   • Pain in thoracic spine 6/20/2013    Log Date: 11/27/2012    • Pulmonary embolism ([facility] 07/2018   • Reflux    • S/P CABG (coronary artery bypass graft) 5/19/2 28.00        Pack years: 43        Quit date: 1988        Years since quittin.4      Smokeless tobacco: Never Used    Substance and Sexual Activity      Alcohol use:  Yes        Alcohol/week: 0.0 standard drinks      Drug use: No    Other Topics decided to give the patient a trigger point injection. This was done under aseptic technique from an appropriate approach; cold therapy applied. Patient was given an injection of 1/2 cc each of dexamethasone phosphate, Kenalog 10, and 0.5% Marcaine.   Maris Rome

## 2019-12-05 NOTE — PROCEDURES
Per Dr Renée Farley to draw up .5ml of dexamethasone sodium phosphate, .5ml kenalog-10 and .5ml sesorcaine 0.5% for a left foot injection. Pt had left office before I could obtain post injection vitals.

## 2020-01-01 ENCOUNTER — EXTERNAL RECORD (OUTPATIENT)
Dept: HEALTH INFORMATION MANAGEMENT | Facility: OTHER | Age: 85
End: 2020-01-01

## 2020-01-06 ENCOUNTER — TELEPHONE (OUTPATIENT)
Dept: SCHEDULING | Age: 85
End: 2020-01-06

## 2020-01-20 ENCOUNTER — TELEPHONE (OUTPATIENT)
Dept: FAMILY MEDICINE | Age: 85
End: 2020-01-20

## 2020-01-20 DIAGNOSIS — E78.2 HYPERLIPEMIA, MIXED: ICD-10-CM

## 2020-01-20 DIAGNOSIS — I10 BENIGN ESSENTIAL HYPERTENSION: Primary | ICD-10-CM

## 2020-01-20 DIAGNOSIS — Z00.00 HEALTHCARE MAINTENANCE: Primary | ICD-10-CM

## 2020-01-21 ENCOUNTER — LAB SERVICES (OUTPATIENT)
Dept: FAMILY MEDICINE | Age: 85
End: 2020-01-21

## 2020-01-21 DIAGNOSIS — I10 BENIGN ESSENTIAL HYPERTENSION: ICD-10-CM

## 2020-01-21 DIAGNOSIS — E87.5 HYPERKALEMIA: Primary | ICD-10-CM

## 2020-01-21 DIAGNOSIS — E78.2 HYPERLIPEMIA, MIXED: ICD-10-CM

## 2020-01-21 PROCEDURE — 36415 COLL VENOUS BLD VENIPUNCTURE: CPT

## 2020-01-22 LAB
ALBUMIN SERPL-MCNC: 3.8 G/DL (ref 3.6–5.1)
ALBUMIN/GLOB SERPL: 1.1 {RATIO} (ref 1–2.4)
ALP SERPL-CCNC: 59 UNITS/L (ref 45–117)
ALT SERPL-CCNC: 22 UNITS/L
ANION GAP SERPL CALC-SCNC: 11 MMOL/L (ref 10–20)
AST SERPL-CCNC: 36 UNITS/L
BILIRUB SERPL-MCNC: 0.9 MG/DL (ref 0.2–1)
BUN SERPL-MCNC: 24 MG/DL (ref 6–20)
BUN/CREAT SERPL: 22 (ref 7–25)
CALCIUM SERPL-MCNC: 9.1 MG/DL (ref 8.4–10.2)
CHLORIDE SERPL-SCNC: 108 MMOL/L (ref 98–107)
CHOLEST SERPL-MCNC: 155 MG/DL
CHOLEST/HDLC SERPL: 2.3 {RATIO}
CO2 SERPL-SCNC: 26 MMOL/L (ref 21–32)
CREAT SERPL-MCNC: 1.09 MG/DL (ref 0.67–1.17)
FASTING STATUS PATIENT QL REPORTED: ABNORMAL HRS
GLOBULIN SER-MCNC: 3.6 G/DL (ref 2–4)
GLUCOSE SERPL-MCNC: 103 MG/DL (ref 65–99)
HDLC SERPL-MCNC: 67 MG/DL
LDLC SERPL-MCNC: 72 MG/DL
LENGTH OF FAST TIME PATIENT: NORMAL HRS
NONHDLC SERPL-MCNC: 88 MG/DL
POTASSIUM SERPL-SCNC: 5.6 MMOL/L (ref 3.4–5.1)
PROT SERPL-MCNC: 7.4 G/DL (ref 6.4–8.2)
SODIUM SERPL-SCNC: 139 MMOL/L (ref 135–145)
TRIGL SERPL-MCNC: 80 MG/DL

## 2020-01-27 ENCOUNTER — TELEPHONE (OUTPATIENT)
Dept: SCHEDULING | Age: 85
End: 2020-01-27

## 2020-01-27 ENCOUNTER — LAB SERVICES (OUTPATIENT)
Dept: LAB | Age: 85
End: 2020-01-27

## 2020-01-27 ENCOUNTER — TELEPHONE (OUTPATIENT)
Dept: FAMILY MEDICINE | Age: 85
End: 2020-01-27

## 2020-01-27 DIAGNOSIS — E87.5 HYPERKALEMIA: ICD-10-CM

## 2020-01-27 DIAGNOSIS — Z00.00 HEALTHCARE MAINTENANCE: ICD-10-CM

## 2020-01-27 PROBLEM — E55.9 VITAMIN D DEFICIENCY: Status: ACTIVE | Noted: 2020-01-27

## 2020-01-27 LAB — POTASSIUM SERPL-SCNC: 4.7 MMOL/L (ref 3.4–5.1)

## 2020-01-28 ENCOUNTER — OFFICE VISIT (OUTPATIENT)
Dept: FAMILY MEDICINE | Age: 85
End: 2020-01-28

## 2020-01-28 VITALS
RESPIRATION RATE: 16 BRPM | WEIGHT: 182.6 LBS | SYSTOLIC BLOOD PRESSURE: 126 MMHG | BODY MASS INDEX: 33.6 KG/M2 | TEMPERATURE: 97.7 F | HEART RATE: 55 BPM | DIASTOLIC BLOOD PRESSURE: 70 MMHG | HEIGHT: 62 IN | OXYGEN SATURATION: 95 %

## 2020-01-28 DIAGNOSIS — I10 BENIGN ESSENTIAL HYPERTENSION: ICD-10-CM

## 2020-01-28 DIAGNOSIS — E78.2 HYPERLIPEMIA, MIXED: Primary | ICD-10-CM

## 2020-01-28 DIAGNOSIS — M19.90 ARTHRITIS: ICD-10-CM

## 2020-01-28 DIAGNOSIS — Z86.79 ATRIAL FIBRILLATION, CURRENTLY IN SINUS RHYTHM: ICD-10-CM

## 2020-01-28 DIAGNOSIS — M48.061 SPINAL STENOSIS OF LUMBAR REGION WITHOUT NEUROGENIC CLAUDICATION: ICD-10-CM

## 2020-01-28 PROBLEM — D50.0 ANEMIA, BLOOD LOSS: Status: RESOLVED | Noted: 2018-07-07 | Resolved: 2020-01-28

## 2020-01-28 LAB — 25(OH)D3+25(OH)D2 SERPL-MCNC: 50.3 NG/ML (ref 30–100)

## 2020-01-28 PROCEDURE — 99214 OFFICE O/P EST MOD 30 MIN: CPT | Performed by: FAMILY MEDICINE

## 2020-01-28 RX ORDER — HYDROCODONE BITARTRATE AND ACETAMINOPHEN 10; 325 MG/1; MG/1
1 TABLET ORAL EVERY 6 HOURS PRN
Qty: 60 TABLET | Refills: 0 | Status: SHIPPED | OUTPATIENT
Start: 2020-01-28 | End: 2020-02-10 | Stop reason: SDUPTHER

## 2020-01-28 RX ORDER — GABAPENTIN 100 MG/1
100 CAPSULE ORAL AT BEDTIME
Qty: 90 CAPSULE | Refills: 3 | Status: SHIPPED | OUTPATIENT
Start: 2020-01-28 | End: 2021-02-12

## 2020-01-28 ASSESSMENT — ENCOUNTER SYMPTOMS
ACTIVITY CHANGE: 0
RESPIRATORY NEGATIVE: 1
NUMBNESS: 0
NEUROLOGICAL NEGATIVE: 1
LIGHT-HEADEDNESS: 0
FATIGUE: 0
PSYCHIATRIC NEGATIVE: 1

## 2020-01-28 ASSESSMENT — PATIENT HEALTH QUESTIONNAIRE - PHQ9
1. LITTLE INTEREST OR PLEASURE IN DOING THINGS: NOT AT ALL
2. FEELING DOWN, DEPRESSED OR HOPELESS: NOT AT ALL
SUM OF ALL RESPONSES TO PHQ9 QUESTIONS 1 AND 2: 0
SUM OF ALL RESPONSES TO PHQ9 QUESTIONS 1 AND 2: 0

## 2020-01-29 ENCOUNTER — TELEPHONE (OUTPATIENT)
Dept: SCHEDULING | Age: 85
End: 2020-01-29

## 2020-01-29 ENCOUNTER — TELEPHONE (OUTPATIENT)
Dept: FAMILY MEDICINE | Age: 85
End: 2020-01-29

## 2020-02-06 ENCOUNTER — OFFICE VISIT (OUTPATIENT)
Dept: PODIATRY CLINIC | Facility: CLINIC | Age: 85
End: 2020-02-06
Payer: MEDICARE

## 2020-02-06 VITALS — DIASTOLIC BLOOD PRESSURE: 62 MMHG | SYSTOLIC BLOOD PRESSURE: 134 MMHG

## 2020-02-06 DIAGNOSIS — M19.90 ARTHRITIS: Primary | ICD-10-CM

## 2020-02-06 DIAGNOSIS — M79.672 LEFT FOOT PAIN: ICD-10-CM

## 2020-02-06 PROCEDURE — 20604 DRAIN/INJ JOINT/BURSA W/US: CPT | Performed by: PODIATRIST

## 2020-02-06 RX ORDER — DEXAMETHASONE SODIUM PHOSPHATE 4 MG/ML
2 INJECTION, SOLUTION INTRA-ARTICULAR; INTRALESIONAL; INTRAMUSCULAR; INTRAVENOUS; SOFT TISSUE ONCE
Status: COMPLETED | OUTPATIENT
Start: 2020-02-06 | End: 2020-02-06

## 2020-02-06 NOTE — PROGRESS NOTES
Per Dr Isai Garcia to draw up .5ml of dexamethasone sodium phosphate, .5ml kenalog-10 and .5ml sesorcaine 0.5% for a left foot injection. Pt had left office before I could obtain post injection vitals.

## 2020-02-06 NOTE — PROGRESS NOTES
Caden Mott is a 82year old male. Patient presents with: Follow - Up: Left foot pain, patient had cortisone injection at last office visit. Patient states that it is still painful and stiff, 5/10.  Patient states that walking long distance, walkin 6/20/2013    Log Date: 10/30/2012    • DJD (degenerative joint disease), ankle and foot 6/27/2014   • Enthesopathy of hip region 8/1/2014   • Heart disease    • History of chest pain    • History of coronary artery stent placement 5/19/2015   • Hypercholes status:       Spouse name: Not on file      Number of children: Not on file      Years of education: Not on file      Highest education level: Not on file    Tobacco Use      Smoking status: Former Smoker        Packs/day: 1.50        Years: 28.00 dexamethasone Sodium Phosphate (DECADRON) 4 MG/ML injection 2 mg  -     triamcinolone acetonide (KENALOG-10) injection 10 mg/mL        Plan: Today ultrasound was used to help identify isolate and aid in the injection of those particular joints. We decided t

## 2020-02-07 ENCOUNTER — TELEPHONE (OUTPATIENT)
Dept: SCHEDULING | Age: 85
End: 2020-02-07

## 2020-02-10 ENCOUNTER — TELEPHONE (OUTPATIENT)
Dept: SCHEDULING | Age: 85
End: 2020-02-10

## 2020-02-10 RX ORDER — HYDROCODONE BITARTRATE AND ACETAMINOPHEN 10; 325 MG/1; MG/1
1 TABLET ORAL EVERY 6 HOURS PRN
Qty: 60 TABLET | Refills: 0 | Status: SHIPPED | OUTPATIENT
Start: 2020-02-10 | End: 2023-07-31 | Stop reason: ALTCHOICE

## 2020-04-01 DIAGNOSIS — E78.2 HYPERLIPEMIA, MIXED: Primary | ICD-10-CM

## 2020-04-01 RX ORDER — ATORVASTATIN CALCIUM 40 MG/1
40 TABLET, FILM COATED ORAL DAILY
Qty: 90 TABLET | Refills: 1 | Status: SHIPPED | OUTPATIENT
Start: 2020-04-01 | End: 2020-10-13

## 2020-06-08 RX ORDER — GABAPENTIN 300 MG/1
CAPSULE ORAL
Qty: 90 CAPSULE | Refills: 0 | Status: SHIPPED | OUTPATIENT
Start: 2020-06-08 | End: 2020-09-12

## 2020-07-10 ENCOUNTER — TELEPHONE (OUTPATIENT)
Dept: SCHEDULING | Age: 85
End: 2020-07-10

## 2020-08-27 ENCOUNTER — PROCEDURE (OUTPATIENT)
Dept: SURGERY | Facility: CLINIC | Age: 85
End: 2020-08-27
Payer: MEDICARE

## 2020-08-27 VITALS — DIASTOLIC BLOOD PRESSURE: 72 MMHG | HEART RATE: 41 BPM | SYSTOLIC BLOOD PRESSURE: 147 MMHG | TEMPERATURE: 97 F

## 2020-08-27 DIAGNOSIS — R82.90 URINE FINDING: Primary | ICD-10-CM

## 2020-08-27 DIAGNOSIS — R79.89 LOW TESTOSTERONE: ICD-10-CM

## 2020-08-27 DIAGNOSIS — Z85.51 HISTORY OF BLADDER CANCER: ICD-10-CM

## 2020-08-27 PROCEDURE — 52000 CYSTOURETHROSCOPY: CPT | Performed by: UROLOGY

## 2020-08-27 RX ORDER — CIPROFLOXACIN 500 MG/1
500 TABLET, FILM COATED ORAL ONCE
Status: COMPLETED | OUTPATIENT
Start: 2020-08-27 | End: 2020-08-27

## 2020-08-27 RX ADMIN — CIPROFLOXACIN 500 MG: 500 TABLET, FILM COATED ORAL at 11:08:00

## 2020-08-27 NOTE — PROGRESS NOTES
Rooming Clinician:     Benoit Deleon is a 82year old male. Patient presents with:  Procedure: cystoscopy        HPI:     Patient has a remote history of bladder cancer and returns for yearly cystoscopy. He has some urinary urgency.   Cytology last malunion 12/15/2014   • Closed fracture of dorsal (thoracic) vertebra without mention of spinal cord injury 6/20/2013    Log Date: 10/30/2012    • DJD (degenerative joint disease), ankle and foot 6/27/2014   • Enthesopathy of hip region 8/1/2014   • Heart Smoker        Packs/day: 1.50        Years: 28.00        Pack years: 43        Quit date: 1988        Years since quittin.1      Smokeless tobacco: Never Used    Alcohol use:  Yes      Alcohol/week: 0.0 standard drinks    Drug use: No       REVIEW instructions.       BACK: no CVA tenderness  NEURO: grossly normal  EXTREMITIES: no cyanosis, clubbing or edema    LAB:     Lab Results   Component Value Date    AST 21 07/18/2019    ALT 24 07/18/2019       X-RAY[[id number]]         ASSESSMENT:     History of bladder

## 2020-08-29 ENCOUNTER — LAB ENCOUNTER (OUTPATIENT)
Dept: LAB | Age: 85
End: 2020-08-29
Attending: UROLOGY
Payer: MEDICARE

## 2020-08-29 DIAGNOSIS — R82.90 URINE FINDING: ICD-10-CM

## 2020-08-29 DIAGNOSIS — R79.89 LOW TESTOSTERONE: ICD-10-CM

## 2020-08-29 DIAGNOSIS — Z85.51 HISTORY OF BLADDER CANCER: ICD-10-CM

## 2020-08-29 LAB — PSA SERPL-MCNC: 0.41 NG/ML (ref ?–4)

## 2020-08-29 PROCEDURE — 84402 ASSAY OF FREE TESTOSTERONE: CPT

## 2020-08-29 PROCEDURE — 84403 ASSAY OF TOTAL TESTOSTERONE: CPT

## 2020-08-29 PROCEDURE — 84153 ASSAY OF PSA TOTAL: CPT

## 2020-08-29 PROCEDURE — 36415 COLL VENOUS BLD VENIPUNCTURE: CPT

## 2020-09-01 ENCOUNTER — TELEPHONE (OUTPATIENT)
Dept: SURGERY | Facility: CLINIC | Age: 85
End: 2020-09-01

## 2020-09-01 NOTE — TELEPHONE ENCOUNTER
RN called patient to inform and relay MD's message and recommendations, \"Recent PSA is normal.  Recommend follow-up as scheduled. \" Testosterone F/T still pending. RN to call back for results at later time. Patient agreeable. No questions at this time.

## 2020-09-02 LAB
TESTOSTERONE, FREE, S: 1.53 NG/DL
TESTOSTERONE, TOTAL, S: 139 NG/DL

## 2020-09-08 NOTE — TELEPHONE ENCOUNTER
RN called patient in response to his request for a call back regarding a lab result. Left message to call us back. No details given. RN to relay Testosterone result.      Testosterone, Free, S  2.88 - 10.5 ng/dL 1.53Low     Comment:     -------------------A

## 2020-09-09 RX ORDER — TESTOSTERONE 16.2 MG/G
GEL TRANSDERMAL
Refills: 0 | Status: CANCELLED | OUTPATIENT
Start: 2020-09-09

## 2020-09-10 RX ORDER — TESTOSTERONE 16.2 MG/G
1 GEL TRANSDERMAL DAILY
Qty: 20.25 G | Refills: 0 | Status: CANCELLED | OUTPATIENT
Start: 2020-09-10

## 2020-09-10 RX ORDER — TESTOSTERONE 16.2 MG/G
1 GEL TRANSDERMAL DAILY
Qty: 20.25 G | Refills: 5 | Status: SHIPPED | OUTPATIENT
Start: 2020-09-10

## 2020-09-10 NOTE — TELEPHONE ENCOUNTER
RN called patient and relayed MD's message and recommendations about his low Testosterone.  See below:     Earnest Cuello MD  9/2/2020 10:50 PM      Your recent Testosterone level is low, 139 with 240 lower limit of normal .  Recommend Consider testoste

## 2020-09-12 RX ORDER — GABAPENTIN 300 MG/1
CAPSULE ORAL
Qty: 90 CAPSULE | Refills: 0 | Status: SHIPPED | OUTPATIENT
Start: 2020-09-12 | End: 2020-12-12

## 2020-09-15 ENCOUNTER — TELEPHONE (OUTPATIENT)
Dept: SURGERY | Facility: CLINIC | Age: 85
End: 2020-09-15

## 2020-09-16 NOTE — TELEPHONE ENCOUNTER
Prior Sai Rivers has been forwarded to the [employer] Prior Auth dept,  Left detailed message on identified VM that it is still pending and he will be contacted when it is approved.

## 2020-09-17 ENCOUNTER — TELEPHONE (OUTPATIENT)
Dept: SCHEDULING | Age: 85
End: 2020-09-17

## 2020-09-21 ENCOUNTER — TELEPHONE (OUTPATIENT)
Dept: SCHEDULING | Age: 85
End: 2020-09-21

## 2020-09-22 ENCOUNTER — IMMUNIZATION (OUTPATIENT)
Dept: FAMILY MEDICINE | Age: 85
End: 2020-09-22

## 2020-09-22 ENCOUNTER — OFFICE VISIT (OUTPATIENT)
Dept: FAMILY MEDICINE | Age: 85
End: 2020-09-22

## 2020-09-22 VITALS
BODY MASS INDEX: 33.86 KG/M2 | WEIGHT: 184 LBS | HEIGHT: 62 IN | HEART RATE: 84 BPM | DIASTOLIC BLOOD PRESSURE: 90 MMHG | TEMPERATURE: 96.4 F | RESPIRATION RATE: 16 BRPM | SYSTOLIC BLOOD PRESSURE: 160 MMHG | OXYGEN SATURATION: 98 %

## 2020-09-22 DIAGNOSIS — R53.83 MALAISE AND FATIGUE: Primary | ICD-10-CM

## 2020-09-22 DIAGNOSIS — Z23 FLU VACCINE NEED: Primary | ICD-10-CM

## 2020-09-22 DIAGNOSIS — R53.81 MALAISE AND FATIGUE: Primary | ICD-10-CM

## 2020-09-22 PROCEDURE — 36415 COLL VENOUS BLD VENIPUNCTURE: CPT

## 2020-09-22 PROCEDURE — 99213 OFFICE O/P EST LOW 20 MIN: CPT | Performed by: FAMILY MEDICINE

## 2020-09-22 PROCEDURE — 90662 IIV NO PRSV INCREASED AG IM: CPT

## 2020-09-22 PROCEDURE — G0008 ADMIN INFLUENZA VIRUS VAC: HCPCS

## 2020-09-22 ASSESSMENT — ENCOUNTER SYMPTOMS
LIGHT-HEADEDNESS: 0
PSYCHIATRIC NEGATIVE: 1
NUMBNESS: 0
NEUROLOGICAL NEGATIVE: 1
RESPIRATORY NEGATIVE: 1
ACTIVITY CHANGE: 0
FATIGUE: 0

## 2020-09-22 ASSESSMENT — PATIENT HEALTH QUESTIONNAIRE - PHQ9
SUM OF ALL RESPONSES TO PHQ9 QUESTIONS 1 AND 2: 0
CLINICAL INTERPRETATION OF PHQ9 SCORE: NO FURTHER SCREENING NEEDED
CLINICAL INTERPRETATION OF PHQ2 SCORE: NO FURTHER SCREENING NEEDED
1. LITTLE INTEREST OR PLEASURE IN DOING THINGS: NOT AT ALL
2. FEELING DOWN, DEPRESSED OR HOPELESS: NOT AT ALL
SUM OF ALL RESPONSES TO PHQ9 QUESTIONS 1 AND 2: 0

## 2020-09-23 DIAGNOSIS — E05.90 HYPERTHYROIDISM: Primary | ICD-10-CM

## 2020-09-23 DIAGNOSIS — E05.90 HYPERTHYROIDISM: ICD-10-CM

## 2020-09-23 LAB
ANION GAP SERPL CALC-SCNC: 11 MMOL/L (ref 10–20)
BASOPHILS # BLD: 0.1 K/MCL (ref 0–0.3)
BASOPHILS NFR BLD: 1 %
BUN SERPL-MCNC: 15 MG/DL (ref 6–20)
BUN/CREAT SERPL: 17 (ref 7–25)
CALCIUM SERPL-MCNC: 9.8 MG/DL (ref 8.4–10.2)
CHLORIDE SERPL-SCNC: 106 MMOL/L (ref 98–107)
CO2 SERPL-SCNC: 27 MMOL/L (ref 21–32)
CREAT SERPL-MCNC: 0.89 MG/DL (ref 0.67–1.17)
DIFFERENTIAL METHOD BLD: ABNORMAL
EOSINOPHIL # BLD: 0.1 K/MCL (ref 0.1–0.5)
EOSINOPHIL NFR BLD: 2 %
ERYTHROCYTE [DISTWIDTH] IN BLOOD: 12.9 % (ref 11–15)
GLUCOSE SERPL-MCNC: 94 MG/DL (ref 65–99)
HCT VFR BLD CALC: 40.8 % (ref 39–51)
HGB BLD-MCNC: 12.9 G/DL (ref 13–17)
IMM GRANULOCYTES # BLD AUTO: 0 K/MCL (ref 0–0.2)
IMM GRANULOCYTES NFR BLD: 0 %
LENGTH OF FAST TIME PATIENT: NORMAL HRS
LYMPHOCYTES # BLD: 2.1 K/MCL (ref 1–4)
LYMPHOCYTES NFR BLD: 30 %
MCH RBC QN AUTO: 30.9 PG (ref 26–34)
MCHC RBC AUTO-ENTMCNC: 31.6 G/DL (ref 32–36.5)
MCV RBC AUTO: 97.8 FL (ref 78–100)
MONOCYTES # BLD: 0.7 K/MCL (ref 0.3–0.9)
MONOCYTES NFR BLD: 10 %
NEUTROPHILS # BLD: 4 K/MCL (ref 1.8–7.7)
NEUTROPHILS NFR BLD: 57 %
NRBC BLD MANUAL-RTO: 0 /100 WBC
PLATELET # BLD: 222 K/MCL (ref 140–450)
POTASSIUM SERPL-SCNC: 4.7 MMOL/L (ref 3.4–5.1)
RBC # BLD: 4.17 MIL/MCL (ref 4.5–5.9)
SODIUM SERPL-SCNC: 139 MMOL/L (ref 135–145)
TSH SERPL-ACNC: 0.28 MCUNITS/ML (ref 0.35–5)
WBC # BLD: 7 K/MCL (ref 4.2–11)

## 2020-09-24 ENCOUNTER — TELEPHONE (OUTPATIENT)
Dept: SCHEDULING | Age: 85
End: 2020-09-24

## 2020-09-24 LAB
T3FREE SERPL-MCNC: 3.3 PG/ML (ref 2.2–4)
T4 FREE SERPL-MCNC: 1.1 NG/DL (ref 0.8–1.5)

## 2020-09-25 ENCOUNTER — TELEPHONE (OUTPATIENT)
Dept: SURGERY | Facility: CLINIC | Age: 85
End: 2020-09-25

## 2020-09-25 NOTE — TELEPHONE ENCOUNTER
RN sent message to patient regarding the denial letter received from [address] Review of Testosterone Gel Pump coverage.      Member ID [id number]  [facility] [phone]

## 2020-10-13 DIAGNOSIS — E78.2 HYPERLIPEMIA, MIXED: ICD-10-CM

## 2020-10-13 RX ORDER — ATORVASTATIN CALCIUM 40 MG/1
TABLET, FILM COATED ORAL
Qty: 90 TABLET | Refills: 1 | Status: SHIPPED | OUTPATIENT
Start: 2020-10-13 | End: 2021-05-01

## 2020-12-03 RX ORDER — TAMSULOSIN HYDROCHLORIDE 0.4 MG/1
CAPSULE ORAL
Qty: 90 CAPSULE | Refills: 3 | Status: SHIPPED | OUTPATIENT
Start: 2020-12-03 | End: 2022-01-18

## 2020-12-03 NOTE — TELEPHONE ENCOUNTER
Benign Prostatic Hypertrophy Medications      Protocol Criteria:  • Appointment scheduled in the past 12 months or in the next 2 months  • If patients is between the ages of 48 and 79 then PSA done within the last 2 years and is either  o Less than or [facility]

## 2020-12-10 ENCOUNTER — HOSPITAL ENCOUNTER (OUTPATIENT)
Dept: LAB | Age: 85
Discharge: HOME | End: 2020-12-10
Attending: INTERNAL MEDICINE

## 2020-12-10 DIAGNOSIS — I25.10 ATHEROSCLEROSIS OF NATIVE CORONARY ARTERY OF NATIVE HEART WITHOUT ANGINA PECTORIS: ICD-10-CM

## 2020-12-10 DIAGNOSIS — Z86.79 PERSONAL HISTORY OF UNSPECIFIED CIRCULATORY DISEASE: ICD-10-CM

## 2020-12-10 DIAGNOSIS — Z95.5 HISTORY OF CORONARY ARTERY STENT PLACEMENT: ICD-10-CM

## 2020-12-10 DIAGNOSIS — I25.41 CORONARY ARTERY ANEURYSM: ICD-10-CM

## 2020-12-10 DIAGNOSIS — I25.41 CORONARY ARTERY ANEURYSM: Primary | ICD-10-CM

## 2020-12-10 DIAGNOSIS — Z95.1 HX OF CABG: ICD-10-CM

## 2020-12-10 DIAGNOSIS — E78.2 MIXED HYPERLIPIDEMIA: ICD-10-CM

## 2020-12-10 DIAGNOSIS — E78.5 HYPERLIPIDEMIA, UNSPECIFIED HYPERLIPIDEMIA TYPE: ICD-10-CM

## 2020-12-10 LAB
ALT SERPL-CCNC: 21 UNITS/L
AST SERPL-CCNC: 14 UNITS/L
CHOLEST SERPL-MCNC: 150 MG/DL
CHOLEST/HDLC SERPL: 2.4 {RATIO}
FASTING DURATION TIME PATIENT: 12 HOURS
HDLC SERPL-MCNC: 62 MG/DL
LDLC SERPL CALC-MCNC: 68 MG/DL
NONHDLC SERPL-MCNC: 88 MG/DL
TRIGL SERPL-MCNC: 99 MG/DL

## 2020-12-10 PROCEDURE — 36415 COLL VENOUS BLD VENIPUNCTURE: CPT | Performed by: INTERNAL MEDICINE

## 2020-12-10 PROCEDURE — 84450 TRANSFERASE (AST) (SGOT): CPT | Performed by: INTERNAL MEDICINE

## 2020-12-10 PROCEDURE — 84460 ALANINE AMINO (ALT) (SGPT): CPT | Performed by: INTERNAL MEDICINE

## 2020-12-10 PROCEDURE — 80061 LIPID PANEL: CPT | Performed by: INTERNAL MEDICINE

## 2020-12-12 RX ORDER — GABAPENTIN 300 MG/1
CAPSULE ORAL
Qty: 90 CAPSULE | Refills: 0 | Status: SHIPPED | OUTPATIENT
Start: 2020-12-12 | End: 2021-03-29

## 2021-01-07 ENCOUNTER — TELEPHONE (OUTPATIENT)
Dept: SCHEDULING | Age: 86
End: 2021-01-07

## 2021-01-19 ENCOUNTER — OFFICE VISIT (OUTPATIENT)
Dept: FAMILY MEDICINE | Age: 86
End: 2021-01-19

## 2021-01-19 VITALS
WEIGHT: 184 LBS | DIASTOLIC BLOOD PRESSURE: 68 MMHG | HEART RATE: 72 BPM | OXYGEN SATURATION: 97 % | RESPIRATION RATE: 16 BRPM | BODY MASS INDEX: 31.41 KG/M2 | HEIGHT: 64 IN | TEMPERATURE: 96.5 F | SYSTOLIC BLOOD PRESSURE: 130 MMHG

## 2021-01-19 DIAGNOSIS — M48.061 SPINAL STENOSIS OF LUMBAR REGION WITHOUT NEUROGENIC CLAUDICATION: ICD-10-CM

## 2021-01-19 DIAGNOSIS — M19.90 ARTHRITIS: ICD-10-CM

## 2021-01-19 DIAGNOSIS — E78.2 HYPERLIPEMIA, MIXED: ICD-10-CM

## 2021-01-19 DIAGNOSIS — R60.0 BILATERAL LOWER EXTREMITY EDEMA: ICD-10-CM

## 2021-01-19 DIAGNOSIS — I10 BENIGN ESSENTIAL HYPERTENSION: Primary | ICD-10-CM

## 2021-01-19 PROCEDURE — 99214 OFFICE O/P EST MOD 30 MIN: CPT | Performed by: FAMILY MEDICINE

## 2021-01-19 RX ORDER — PANTOPRAZOLE SODIUM 40 MG/1
40 TABLET, DELAYED RELEASE ORAL
COMMUNITY
End: 2023-01-31 | Stop reason: ALTCHOICE

## 2021-01-19 RX ORDER — TESTOSTERONE 16.2 MG/G
GEL TRANSDERMAL
COMMUNITY
Start: 2020-09-10 | End: 2021-10-09

## 2021-01-19 ASSESSMENT — ENCOUNTER SYMPTOMS
EYES NEGATIVE: 1
CONSTITUTIONAL NEGATIVE: 1
SLEEP DISTURBANCE: 1
RESPIRATORY NEGATIVE: 1
GASTROINTESTINAL NEGATIVE: 1
NEUROLOGICAL NEGATIVE: 1
ENDOCRINE NEGATIVE: 1
ALLERGIC/IMMUNOLOGIC NEGATIVE: 1
HEMATOLOGIC/LYMPHATIC NEGATIVE: 1
BACK PAIN: 1

## 2021-01-19 ASSESSMENT — PATIENT HEALTH QUESTIONNAIRE - PHQ9
SUM OF ALL RESPONSES TO PHQ9 QUESTIONS 1 AND 2: 0
2. FEELING DOWN, DEPRESSED OR HOPELESS: NOT AT ALL
1. LITTLE INTEREST OR PLEASURE IN DOING THINGS: NOT AT ALL
CLINICAL INTERPRETATION OF PHQ2 SCORE: NO FURTHER SCREENING NEEDED
CLINICAL INTERPRETATION OF PHQ9 SCORE: NO FURTHER SCREENING NEEDED
SUM OF ALL RESPONSES TO PHQ9 QUESTIONS 1 AND 2: 0

## 2021-02-02 ENCOUNTER — OFFICE VISIT (OUTPATIENT)
Dept: PODIATRY CLINIC | Facility: CLINIC | Age: 86
End: 2021-02-02
Payer: MEDICARE

## 2021-02-02 DIAGNOSIS — M20.41 HAMMER TOE OF RIGHT FOOT: ICD-10-CM

## 2021-02-02 DIAGNOSIS — M79.672 LEFT FOOT PAIN: ICD-10-CM

## 2021-02-02 DIAGNOSIS — M19.90 ARTHRITIS: Primary | ICD-10-CM

## 2021-02-02 DIAGNOSIS — M20.11 HALLUX VALGUS OF RIGHT FOOT: ICD-10-CM

## 2021-02-02 PROCEDURE — 99213 OFFICE O/P EST LOW 20 MIN: CPT | Performed by: PODIATRIST

## 2021-02-02 RX ORDER — PANTOPRAZOLE SODIUM 40 MG/1
40 TABLET, DELAYED RELEASE ORAL
COMMUNITY

## 2021-02-02 NOTE — PROGRESS NOTES
Mel Shah is a 82year old male. Patient presents with: Foot Pain: general foot ck.         HPI:     Patient presents today for follow-up evaluation he has had a bunion done on his left foot many years ago with recurrence he has no pain he has di • Closed Colles' fracture of right radius with malunion 12/15/2014   • Closed fracture of dorsal (thoracic) vertebra without mention of spinal cord injury 6/20/2013    Log Date: 10/30/2012    • DJD (degenerative joint disease), ankle and foot 6/27/2014 Relation Age of Onset   • Heart Disorder Father    • Other (Other) Father    • Other (Other) Mother       Social History    Socioeconomic History      Marital status:       Spouse name: Not on file      Number of children: Not on file      Years of swelling in that region.   He has had previous injections but they are not lasting long enough to give him good relief        ASSESSMENT AND PLAN:   Diagnoses and all orders for this visit:    Arthritis    Left foot pain    Hallux valgus of right foot    Sejal Champ

## 2021-02-09 DIAGNOSIS — Z23 NEED FOR VACCINATION: ICD-10-CM

## 2021-02-12 RX ORDER — GABAPENTIN 100 MG/1
100 CAPSULE ORAL AT BEDTIME
Qty: 90 CAPSULE | Refills: 0 | Status: SHIPPED | OUTPATIENT
Start: 2021-02-12 | End: 2021-05-18

## 2021-03-23 ENCOUNTER — HOSPITAL ENCOUNTER (OUTPATIENT)
Dept: GENERAL RADIOLOGY | Age: 86
Discharge: HOME | End: 2021-03-23
Attending: EMERGENCY MEDICINE

## 2021-03-23 ENCOUNTER — WALK IN (OUTPATIENT)
Dept: URGENT CARE | Age: 86
End: 2021-03-23
Attending: EMERGENCY MEDICINE

## 2021-03-23 DIAGNOSIS — M53.3 COCCYX PAIN: ICD-10-CM

## 2021-03-23 DIAGNOSIS — M53.3 COCCYX PAIN: Primary | ICD-10-CM

## 2021-03-23 DIAGNOSIS — I71.40 ABDOMINAL AORTIC ANEURYSM (AAA) WITHOUT RUPTURE (CMD): ICD-10-CM

## 2021-03-23 PROCEDURE — 72220 X-RAY EXAM SACRUM TAILBONE: CPT

## 2021-03-23 PROCEDURE — 99212 OFFICE O/P EST SF 10 MIN: CPT

## 2021-03-23 RX ORDER — METHYLPREDNISOLONE 4 MG/1
4 TABLET ORAL SEE ADMIN INSTRUCTIONS
Qty: 21 TABLET | Refills: 0 | Status: SHIPPED | OUTPATIENT
Start: 2021-03-23 | End: 2023-01-31 | Stop reason: ALTCHOICE

## 2021-03-23 ASSESSMENT — PAIN SCALES - GENERAL: PAINLEVEL: 5-6

## 2021-03-29 RX ORDER — GABAPENTIN 300 MG/1
CAPSULE ORAL
Qty: 90 CAPSULE | Refills: 0 | Status: SHIPPED | OUTPATIENT
Start: 2021-03-29 | End: 2021-07-05

## 2021-03-30 ENCOUNTER — TELEPHONE (OUTPATIENT)
Dept: SCHEDULING | Age: 86
End: 2021-03-30

## 2021-04-03 ENCOUNTER — TELEPHONE (OUTPATIENT)
Dept: SCHEDULING | Age: 86
End: 2021-04-03

## 2021-04-07 ENCOUNTER — APPOINTMENT (OUTPATIENT)
Dept: FAMILY MEDICINE | Age: 86
End: 2021-04-07

## 2021-04-30 DIAGNOSIS — E78.2 HYPERLIPEMIA, MIXED: ICD-10-CM

## 2021-05-01 RX ORDER — ATORVASTATIN CALCIUM 40 MG/1
TABLET, FILM COATED ORAL
Qty: 90 TABLET | Refills: 1 | Status: SHIPPED | OUTPATIENT
Start: 2021-05-01 | End: 2021-10-25

## 2021-05-03 ENCOUNTER — HOSPITAL ENCOUNTER (OUTPATIENT)
Dept: GENERAL RADIOLOGY | Age: 86
Discharge: HOME | End: 2021-05-03
Attending: EMERGENCY MEDICINE

## 2021-05-03 ENCOUNTER — WALK IN (OUTPATIENT)
Dept: URGENT CARE | Age: 86
End: 2021-05-03
Attending: EMERGENCY MEDICINE

## 2021-05-03 DIAGNOSIS — S20.212A CONTUSION OF LEFT CHEST WALL, INITIAL ENCOUNTER: Primary | ICD-10-CM

## 2021-05-03 DIAGNOSIS — S20.212A CONTUSION OF LEFT CHEST WALL, INITIAL ENCOUNTER: ICD-10-CM

## 2021-05-03 PROCEDURE — 71101 X-RAY EXAM UNILAT RIBS/CHEST: CPT

## 2021-05-03 PROCEDURE — 99212 OFFICE O/P EST SF 10 MIN: CPT

## 2021-05-03 ASSESSMENT — PAIN SCALES - GENERAL
PAINLEVEL: 0
PAINLEVEL_OUTOF10: 0

## 2021-05-18 RX ORDER — GABAPENTIN 100 MG/1
100 CAPSULE ORAL AT BEDTIME
Qty: 90 CAPSULE | Refills: 0 | Status: SHIPPED | OUTPATIENT
Start: 2021-05-18 | End: 2021-08-30

## 2021-05-26 VITALS
BODY MASS INDEX: 32.26 KG/M2 | TEMPERATURE: 96.5 F | WEIGHT: 177 LBS | DIASTOLIC BLOOD PRESSURE: 72 MMHG | WEIGHT: 185 LBS | RESPIRATION RATE: 16 BRPM | OXYGEN SATURATION: 96 % | HEART RATE: 60 BPM | DIASTOLIC BLOOD PRESSURE: 80 MMHG | BODY MASS INDEX: 30.86 KG/M2 | HEART RATE: 63 BPM | RESPIRATION RATE: 17 BRPM | OXYGEN SATURATION: 97 % | SYSTOLIC BLOOD PRESSURE: 119 MMHG | SYSTOLIC BLOOD PRESSURE: 140 MMHG | TEMPERATURE: 97.8 F

## 2021-06-04 ENCOUNTER — HOSPITAL ENCOUNTER (OUTPATIENT)
Dept: LAB | Age: 86
Discharge: HOME | End: 2021-06-04
Attending: INTERNAL MEDICINE

## 2021-06-04 DIAGNOSIS — I25.41 ANEURYSM OF CORONARY VESSELS: ICD-10-CM

## 2021-06-04 DIAGNOSIS — Z95.1 POSTSURGICAL AORTOCORONARY BYPASS STATUS: ICD-10-CM

## 2021-06-04 DIAGNOSIS — I65.29 OCCLUSION OF CAROTID ARTERY, UNSPECIFIED LATERALITY: ICD-10-CM

## 2021-06-04 DIAGNOSIS — I10 ESSENTIAL HYPERTENSION, MALIGNANT: ICD-10-CM

## 2021-06-04 DIAGNOSIS — Z95.5 HISTORY OF CORONARY ARTERY STENT PLACEMENT: ICD-10-CM

## 2021-06-04 DIAGNOSIS — E78.5 HYPERLIPIDEMIA, UNSPECIFIED HYPERLIPIDEMIA TYPE: ICD-10-CM

## 2021-06-04 DIAGNOSIS — Z86.79 PERSONAL HISTORY OF UNSPECIFIED CIRCULATORY DISEASE: ICD-10-CM

## 2021-06-04 DIAGNOSIS — E78.2 MIXED HYPERLIPIDEMIA: Primary | ICD-10-CM

## 2021-06-04 DIAGNOSIS — E78.2 MIXED HYPERLIPIDEMIA: ICD-10-CM

## 2021-06-04 LAB
ALT SERPL-CCNC: 24 UNITS/L
AST SERPL-CCNC: 17 UNITS/L
CHOLEST SERPL-MCNC: 149 MG/DL
CHOLEST/HDLC SERPL: 2.3 {RATIO}
FASTING DURATION TIME PATIENT: 12 HOURS
HDLC SERPL-MCNC: 64 MG/DL
LDLC SERPL CALC-MCNC: 66 MG/DL
NONHDLC SERPL-MCNC: 85 MG/DL
TRIGL SERPL-MCNC: 94 MG/DL

## 2021-06-04 PROCEDURE — 84460 ALANINE AMINO (ALT) (SGPT): CPT | Performed by: INTERNAL MEDICINE

## 2021-06-04 PROCEDURE — 80061 LIPID PANEL: CPT | Performed by: INTERNAL MEDICINE

## 2021-06-04 PROCEDURE — 84450 TRANSFERASE (AST) (SGOT): CPT | Performed by: INTERNAL MEDICINE

## 2021-06-04 PROCEDURE — 36415 COLL VENOUS BLD VENIPUNCTURE: CPT | Performed by: INTERNAL MEDICINE

## 2021-07-05 RX ORDER — GABAPENTIN 300 MG/1
CAPSULE ORAL
Qty: 90 CAPSULE | Refills: 0 | Status: SHIPPED | OUTPATIENT
Start: 2021-07-05 | End: 2021-10-12

## 2021-07-15 ENCOUNTER — APPOINTMENT (OUTPATIENT)
Dept: FAMILY MEDICINE | Age: 86
End: 2021-07-15

## 2021-07-20 ENCOUNTER — OFFICE VISIT (OUTPATIENT)
Dept: FAMILY MEDICINE | Age: 86
End: 2021-07-20

## 2021-07-20 VITALS
HEART RATE: 56 BPM | RESPIRATION RATE: 16 BRPM | HEIGHT: 63 IN | TEMPERATURE: 96.8 F | BODY MASS INDEX: 31.18 KG/M2 | WEIGHT: 176 LBS | SYSTOLIC BLOOD PRESSURE: 132 MMHG | DIASTOLIC BLOOD PRESSURE: 68 MMHG | OXYGEN SATURATION: 95 %

## 2021-07-20 DIAGNOSIS — M48.061 SPINAL STENOSIS OF LUMBAR REGION WITHOUT NEUROGENIC CLAUDICATION: ICD-10-CM

## 2021-07-20 DIAGNOSIS — E78.2 HYPERLIPEMIA, MIXED: ICD-10-CM

## 2021-07-20 DIAGNOSIS — Z00.00 ENCOUNTER FOR MEDICARE ANNUAL WELLNESS EXAM: Primary | ICD-10-CM

## 2021-07-20 DIAGNOSIS — M19.90 ARTHRITIS: ICD-10-CM

## 2021-07-20 DIAGNOSIS — M46.96 INFLAMMATORY SPONDYLOPATHY OF LUMBAR REGION (CMD): ICD-10-CM

## 2021-07-20 DIAGNOSIS — I10 BENIGN ESSENTIAL HYPERTENSION: ICD-10-CM

## 2021-07-20 PROCEDURE — G0438 PPPS, INITIAL VISIT: HCPCS | Performed by: FAMILY MEDICINE

## 2021-07-20 ASSESSMENT — ACTIVITIES OF DAILY LIVING (ADL)
ADL_SHORT_OF_BREATH: NO
RECENT_DECLINE_ADL: NO
NEEDS_ASSIST: NO

## 2021-07-20 ASSESSMENT — ENCOUNTER SYMPTOMS
SHORTNESS OF BREATH: 0
DIARRHEA: 0
ABDOMINAL PAIN: 0
COUGH: 0
CONSTIPATION: 0
WHEEZING: 0

## 2021-07-20 ASSESSMENT — COGNITIVE AND FUNCTIONAL STATUS - GENERAL
ARE YOU DEAF OR DO YOU HAVE SERIOUS DIFFICULTY  HEARING: YES
ARE YOU BLIND OR DO YOU HAVE SERIOUS DIFFICULTY SEEING, EVEN WHEN WEARING GLASSES: NO

## 2021-07-20 ASSESSMENT — PATIENT HEALTH QUESTIONNAIRE - PHQ9
1. LITTLE INTEREST OR PLEASURE IN DOING THINGS: NOT AT ALL
2. FEELING DOWN, DEPRESSED OR HOPELESS: NOT AT ALL
SUM OF ALL RESPONSES TO PHQ9 QUESTIONS 1 AND 2: 0
SUM OF ALL RESPONSES TO PHQ9 QUESTIONS 1 AND 2: 0
1. LITTLE INTEREST OR PLEASURE IN DOING THINGS: NOT AT ALL
CLINICAL INTERPRETATION OF PHQ2 SCORE: NO FURTHER SCREENING NEEDED
CLINICAL INTERPRETATION OF PHQ9 SCORE: NO FURTHER SCREENING NEEDED
CLINICAL INTERPRETATION OF PHQ2 SCORE: NO FURTHER SCREENING NEEDED
SUM OF ALL RESPONSES TO PHQ9 QUESTIONS 1 AND 2: 0
2. FEELING DOWN, DEPRESSED OR HOPELESS: NOT AT ALL

## 2021-08-18 ENCOUNTER — TELEPHONE (OUTPATIENT)
Dept: SURGERY | Facility: CLINIC | Age: 86
End: 2021-08-18

## 2021-08-18 NOTE — TELEPHONE ENCOUNTER
Patient is scheduled for in office cystoscopy CPT 68805 on Tuesday, August 24th, 2021.     Insurance is Medicare part B with [employer] supplemental.  Prior authorization is not required with [facility] Medicare and supplemental.

## 2021-08-24 ENCOUNTER — PROCEDURE (OUTPATIENT)
Dept: SURGERY | Facility: CLINIC | Age: 86
End: 2021-08-24
Payer: MEDICARE

## 2021-08-24 VITALS — SYSTOLIC BLOOD PRESSURE: 136 MMHG | DIASTOLIC BLOOD PRESSURE: 74 MMHG | HEART RATE: 51 BPM

## 2021-08-24 DIAGNOSIS — Z85.51 HISTORY OF BLADDER CANCER: ICD-10-CM

## 2021-08-24 DIAGNOSIS — R79.89 LOW TESTOSTERONE: ICD-10-CM

## 2021-08-24 DIAGNOSIS — R82.90 URINE FINDING: Primary | ICD-10-CM

## 2021-08-24 LAB
APPEARANCE: CLEAR
BILIRUBIN: NEGATIVE
GLUCOSE (URINE DIPSTICK): NEGATIVE MG/DL
KETONES (URINE DIPSTICK): NEGATIVE MG/DL
LEUKOCYTES: NEGATIVE
MULTISTIX LOT#: ABNORMAL NUMERIC
NITRITE, URINE: NEGATIVE
PH, URINE: 7 (ref 4.5–8)
PROTEIN (URINE DIPSTICK): NEGATIVE MG/DL
SPECIFIC GRAVITY: 1.02 (ref 1–1.03)
URINE-COLOR: YELLOW
UROBILINOGEN,SEMI-QN: 0.2 MG/DL (ref 0–1.9)

## 2021-08-24 PROCEDURE — 81003 URINALYSIS AUTO W/O SCOPE: CPT | Performed by: UROLOGY

## 2021-08-24 PROCEDURE — 52000 CYSTOURETHROSCOPY: CPT | Performed by: UROLOGY

## 2021-08-24 RX ORDER — CIPROFLOXACIN 500 MG/1
500 TABLET, FILM COATED ORAL ONCE
Status: COMPLETED | OUTPATIENT
Start: 2021-08-24 | End: 2021-08-24

## 2021-08-24 RX ADMIN — CIPROFLOXACIN 500 MG: 500 TABLET, FILM COATED ORAL at 11:53:00

## 2021-08-25 ENCOUNTER — WALK IN (OUTPATIENT)
Dept: URGENT CARE | Age: 86
End: 2021-08-25
Attending: EMERGENCY MEDICINE

## 2021-08-25 VITALS
RESPIRATION RATE: 16 BRPM | DIASTOLIC BLOOD PRESSURE: 82 MMHG | SYSTOLIC BLOOD PRESSURE: 144 MMHG | TEMPERATURE: 96.1 F | OXYGEN SATURATION: 99 % | HEART RATE: 52 BPM | WEIGHT: 175 LBS | BODY MASS INDEX: 31 KG/M2

## 2021-08-25 DIAGNOSIS — L25.3 CONTACT DERMATITIS DUE TO CHEMICALS: Primary | ICD-10-CM

## 2021-08-25 PROCEDURE — 99212 OFFICE O/P EST SF 10 MIN: CPT

## 2021-08-25 ASSESSMENT — PAIN SCALES - GENERAL
PAINLEVEL: 0
PAINLEVEL_OUTOF10: 0
PAINLEVEL_OUTOF10: 0

## 2021-08-30 RX ORDER — GABAPENTIN 100 MG/1
100 CAPSULE ORAL AT BEDTIME
Qty: 90 CAPSULE | Refills: 0 | Status: SHIPPED | OUTPATIENT
Start: 2021-08-30 | End: 2021-11-29

## 2021-09-21 ENCOUNTER — TELEPHONE (OUTPATIENT)
Dept: SCHEDULING | Age: 86
End: 2021-09-21

## 2021-10-04 ENCOUNTER — TELEPHONE (OUTPATIENT)
Dept: SCHEDULING | Age: 86
End: 2021-10-04

## 2021-10-05 ENCOUNTER — APPOINTMENT (OUTPATIENT)
Dept: FAMILY MEDICINE | Age: 86
End: 2021-10-05

## 2021-10-09 ENCOUNTER — HOSPITAL ENCOUNTER (OUTPATIENT)
Dept: GENERAL RADIOLOGY | Age: 86
Discharge: HOME | End: 2021-10-09
Attending: EMERGENCY MEDICINE

## 2021-10-09 ENCOUNTER — WALK IN (OUTPATIENT)
Dept: URGENT CARE | Age: 86
End: 2021-10-09
Attending: EMERGENCY MEDICINE

## 2021-10-09 VITALS
HEART RATE: 64 BPM | SYSTOLIC BLOOD PRESSURE: 156 MMHG | RESPIRATION RATE: 20 BRPM | OXYGEN SATURATION: 98 % | TEMPERATURE: 97.3 F | DIASTOLIC BLOOD PRESSURE: 86 MMHG

## 2021-10-09 DIAGNOSIS — M79.672 LEFT FOOT PAIN: Primary | ICD-10-CM

## 2021-10-09 DIAGNOSIS — M79.672 LEFT FOOT PAIN: ICD-10-CM

## 2021-10-09 PROCEDURE — 99212 OFFICE O/P EST SF 10 MIN: CPT

## 2021-10-09 PROCEDURE — 73630 X-RAY EXAM OF FOOT: CPT

## 2021-10-09 ASSESSMENT — ENCOUNTER SYMPTOMS
COUGH: 0
DIZZINESS: 0
BACK PAIN: 0
ABDOMINAL PAIN: 0
FEVER: 0

## 2021-10-09 ASSESSMENT — PAIN SCALES - GENERAL: PAINLEVEL: 7

## 2021-10-12 RX ORDER — GABAPENTIN 300 MG/1
CAPSULE ORAL
Qty: 90 CAPSULE | Refills: 0 | Status: SHIPPED | OUTPATIENT
Start: 2021-10-12 | End: 2022-01-05

## 2021-10-25 DIAGNOSIS — E78.2 HYPERLIPEMIA, MIXED: ICD-10-CM

## 2021-10-25 RX ORDER — ATORVASTATIN CALCIUM 40 MG/1
TABLET, FILM COATED ORAL
Qty: 90 TABLET | Refills: 1 | Status: SHIPPED | OUTPATIENT
Start: 2021-10-25 | End: 2022-05-30

## 2021-10-28 ENCOUNTER — OFFICE VISIT (OUTPATIENT)
Dept: PODIATRY CLINIC | Facility: CLINIC | Age: 86
End: 2021-10-28
Payer: MEDICARE

## 2021-10-28 VITALS — SYSTOLIC BLOOD PRESSURE: 134 MMHG | DIASTOLIC BLOOD PRESSURE: 72 MMHG

## 2021-10-28 DIAGNOSIS — M79.672 LEFT FOOT PAIN: ICD-10-CM

## 2021-10-28 DIAGNOSIS — M19.90 ARTHRITIS: Primary | ICD-10-CM

## 2021-10-28 PROCEDURE — 20600 DRAIN/INJ JOINT/BURSA W/O US: CPT | Performed by: PODIATRIST

## 2021-10-28 RX ORDER — TRIAMCINOLONE ACETONIDE 40 MG/ML
20 INJECTION, SUSPENSION INTRA-ARTICULAR; INTRAMUSCULAR ONCE
Status: COMPLETED | OUTPATIENT
Start: 2021-10-28 | End: 2021-10-28

## 2021-10-28 NOTE — PROGRESS NOTES
Per Dr. Maki Mroris draw up 0.5ml of 0.5% Marcaine and 0.5ml of Kenalog 40 for injection to left foot.

## 2021-10-30 NOTE — PROGRESS NOTES
Amna Phillip is a 82year old male. Patient presents with:   Foot Pain: bilateral foot pain pt states left foot is worse 2/10        HPI:   This patient is an 59-year-old gentleman who presents with chief complaint foot pain on both feet but the left ([facility] 5/19/2015   • CAD in native artery 5/19/2015   • Carotid artery stenosis, unspecified laterality 5/19/2015   • Closed Colles' fracture of right radius with malunion 12/15/2014   • Closed fracture of dorsal (thoracic) vertebra without mention of [employer]   • INJ PARAVERT F JNT C/T 2 LEV Bilateral 7/15/2014    Procedure: FACET INJECTION UNDER FLUOROSCOPY;  Surgeon: Tarun Metzger DO;  Location: [address] [place] [address]   • INJECTION, W/WO CONTRAST, DX/THERAPEUTIC SUBSTANCE, EPIDURAL/SUBARACHNOID; LUM 9/9/2014    Procedure: SACROILIAC JOINT INJECTION;  Surgeon: Trinh Don DO;  Location: [address] [place] [address]   • REPAIR ROTATOR CUFF,ACUTE        Family History   Problem Relation Age of Onset   • Heart Disorder Father    • Other (Other) Father joint.    ASSESSMENT AND PLAN:   Diagnoses and all orders for this visit:    Arthritis  -     triamcinolone acetonide (KENALOG-40) 40 MG/ML injection 20 mg  -     XR FOOT WEIGHTBEARING (2 VIEWS), LEFT   (CPT=73620)  -     XR FOOT WEIGHTBEARING (2 VIEWS), R

## 2021-11-29 RX ORDER — GABAPENTIN 100 MG/1
100 CAPSULE ORAL AT BEDTIME
Qty: 90 CAPSULE | Refills: 0 | Status: SHIPPED | OUTPATIENT
Start: 2021-11-29 | End: 2022-03-01

## 2021-12-28 ENCOUNTER — HOSPITAL ENCOUNTER (OUTPATIENT)
Dept: LAB | Age: 86
Discharge: HOME | End: 2021-12-28
Attending: INTERNAL MEDICINE

## 2021-12-28 DIAGNOSIS — E78.5 HYPERLIPIDEMIA, UNSPECIFIED HYPERLIPIDEMIA TYPE: ICD-10-CM

## 2021-12-28 DIAGNOSIS — I25.41 CORONARY ARTERY ANEURYSM: ICD-10-CM

## 2021-12-28 DIAGNOSIS — Z95.1 HX OF CABG: ICD-10-CM

## 2021-12-28 DIAGNOSIS — I10 HYPERTENSION, BENIGN: ICD-10-CM

## 2021-12-28 DIAGNOSIS — E78.2 MIXED HYPERLIPIDEMIA: ICD-10-CM

## 2021-12-28 DIAGNOSIS — I25.41 CORONARY ARTERY ANEURYSM: Primary | ICD-10-CM

## 2021-12-28 DIAGNOSIS — I65.23 BILATERAL CAROTID ARTERY STENOSIS: ICD-10-CM

## 2021-12-28 DIAGNOSIS — Z86.79 HISTORY OF ATRIAL FIBRILLATION: ICD-10-CM

## 2021-12-28 LAB
ALT SERPL-CCNC: 27 UNITS/L
AST SERPL-CCNC: 20 UNITS/L
CHOLEST SERPL-MCNC: 156 MG/DL
CHOLEST/HDLC SERPL: 2.6 {RATIO}
FASTING DURATION TIME PATIENT: 12 HOURS (ref 0–999)
HDLC SERPL-MCNC: 61 MG/DL
LDLC SERPL CALC-MCNC: 77 MG/DL
NONHDLC SERPL-MCNC: 95 MG/DL
TRIGL SERPL-MCNC: 91 MG/DL

## 2021-12-28 PROCEDURE — 84460 ALANINE AMINO (ALT) (SGPT): CPT | Performed by: INTERNAL MEDICINE

## 2021-12-28 PROCEDURE — 36415 COLL VENOUS BLD VENIPUNCTURE: CPT | Performed by: INTERNAL MEDICINE

## 2021-12-28 PROCEDURE — 80061 LIPID PANEL: CPT | Performed by: INTERNAL MEDICINE

## 2021-12-28 PROCEDURE — 84450 TRANSFERASE (AST) (SGOT): CPT | Performed by: INTERNAL MEDICINE

## 2022-01-05 RX ORDER — GABAPENTIN 300 MG/1
CAPSULE ORAL
Qty: 90 CAPSULE | Refills: 0 | Status: SHIPPED | OUTPATIENT
Start: 2022-01-05 | End: 2022-04-07

## 2022-01-13 ENCOUNTER — LAB SERVICES (OUTPATIENT)
Dept: FAMILY MEDICINE | Age: 87
End: 2022-01-13

## 2022-01-13 DIAGNOSIS — E78.2 HYPERLIPEMIA, MIXED: ICD-10-CM

## 2022-01-13 DIAGNOSIS — E55.9 VITAMIN D DEFICIENCY: ICD-10-CM

## 2022-01-13 DIAGNOSIS — I10 BENIGN ESSENTIAL HYPERTENSION: Primary | ICD-10-CM

## 2022-01-17 ENCOUNTER — LAB SERVICES (OUTPATIENT)
Dept: FAMILY MEDICINE | Age: 87
End: 2022-01-17

## 2022-01-17 DIAGNOSIS — E78.2 HYPERLIPEMIA, MIXED: ICD-10-CM

## 2022-01-17 DIAGNOSIS — E55.9 VITAMIN D DEFICIENCY: ICD-10-CM

## 2022-01-17 DIAGNOSIS — I10 BENIGN ESSENTIAL HYPERTENSION: ICD-10-CM

## 2022-01-17 PROCEDURE — 82306 VITAMIN D 25 HYDROXY: CPT | Performed by: CLINICAL MEDICAL LABORATORY

## 2022-01-17 PROCEDURE — 80053 COMPREHEN METABOLIC PANEL: CPT | Performed by: CLINICAL MEDICAL LABORATORY

## 2022-01-17 PROCEDURE — 36415 COLL VENOUS BLD VENIPUNCTURE: CPT | Performed by: FAMILY MEDICINE

## 2022-01-18 ENCOUNTER — TELEPHONE (OUTPATIENT)
Dept: SURGERY | Facility: CLINIC | Age: 87
End: 2022-01-18

## 2022-01-18 DIAGNOSIS — Z85.51 HISTORY OF BLADDER CANCER: Primary | ICD-10-CM

## 2022-01-18 LAB
25(OH)D3+25(OH)D2 SERPL-MCNC: 38.2 NG/ML (ref 30–100)
ALBUMIN SERPL-MCNC: 3.7 G/DL (ref 3.6–5.1)
ALBUMIN/GLOB SERPL: 1.1 {RATIO} (ref 1–2.4)
ALP SERPL-CCNC: 59 UNITS/L (ref 45–117)
ALT SERPL-CCNC: 24 UNITS/L
ANION GAP SERPL CALC-SCNC: 9 MMOL/L (ref 10–20)
AST SERPL-CCNC: 18 UNITS/L
BILIRUB SERPL-MCNC: 0.7 MG/DL (ref 0.2–1)
BUN SERPL-MCNC: 21 MG/DL (ref 6–20)
BUN/CREAT SERPL: 18 (ref 7–25)
CALCIUM SERPL-MCNC: 9.5 MG/DL (ref 8.4–10.2)
CHLORIDE SERPL-SCNC: 109 MMOL/L (ref 98–107)
CO2 SERPL-SCNC: 28 MMOL/L (ref 21–32)
CREAT SERPL-MCNC: 1.15 MG/DL (ref 0.67–1.17)
FASTING DURATION TIME PATIENT: 13 HOURS (ref 0–999)
GFR SERPLBLD BASED ON 1.73 SQ M-ARVRAT: 58 ML/MIN
GLOBULIN SER-MCNC: 3.4 G/DL (ref 2–4)
GLUCOSE SERPL-MCNC: 107 MG/DL (ref 70–99)
POTASSIUM SERPL-SCNC: 4.6 MMOL/L (ref 3.4–5.1)
PROT SERPL-MCNC: 7.1 G/DL (ref 6.4–8.2)
SODIUM SERPL-SCNC: 141 MMOL/L (ref 135–145)

## 2022-01-18 RX ORDER — TAMSULOSIN HYDROCHLORIDE 0.4 MG/1
0.4 CAPSULE ORAL DAILY
Qty: 90 CAPSULE | Refills: 2 | Status: SHIPPED | OUTPATIENT
Start: 2022-01-18

## 2022-01-18 NOTE — TELEPHONE ENCOUNTER
• tamsulosin (FLOMAX) cap TAKE 1 CAPSULE EVERY DAY  1/2  HOUR  AFTER  THE  SAME  MEAL EACH DAY 90 capsule 3

## 2022-01-18 NOTE — TELEPHONE ENCOUNTER
RN approved the refill request of Tamsulosin, 1/18. Patient was last seen by Dr Elizabeth Amaya on 8/24/21.      Benign Prostatic Hypertrophy Medications      Protocol Criteria:  • Appointment scheduled in the past 12 months or in the next 2 months  • If patients i

## 2022-01-24 ENCOUNTER — TELEPHONE (OUTPATIENT)
Dept: FAMILY MEDICINE | Age: 87
End: 2022-01-24

## 2022-01-24 ENCOUNTER — OFFICE VISIT (OUTPATIENT)
Dept: FAMILY MEDICINE | Age: 87
End: 2022-01-24

## 2022-01-24 VITALS
HEIGHT: 63 IN | SYSTOLIC BLOOD PRESSURE: 138 MMHG | BODY MASS INDEX: 32.6 KG/M2 | HEART RATE: 60 BPM | RESPIRATION RATE: 16 BRPM | DIASTOLIC BLOOD PRESSURE: 78 MMHG | TEMPERATURE: 97 F | OXYGEN SATURATION: 95 % | WEIGHT: 184 LBS

## 2022-01-24 DIAGNOSIS — Z86.79 ATRIAL FIBRILLATION, CURRENTLY IN SINUS RHYTHM: ICD-10-CM

## 2022-01-24 DIAGNOSIS — I10 BENIGN ESSENTIAL HYPERTENSION: Primary | ICD-10-CM

## 2022-01-24 DIAGNOSIS — M19.90 ARTHRITIS: ICD-10-CM

## 2022-01-24 DIAGNOSIS — M40.00 ACQUIRED POSTURAL KYPHOSIS: ICD-10-CM

## 2022-01-24 PROCEDURE — 99213 OFFICE O/P EST LOW 20 MIN: CPT | Performed by: FAMILY MEDICINE

## 2022-01-24 ASSESSMENT — PATIENT HEALTH QUESTIONNAIRE - PHQ9
1. LITTLE INTEREST OR PLEASURE IN DOING THINGS: NOT AT ALL
CLINICAL INTERPRETATION OF PHQ2 SCORE: NO FURTHER SCREENING NEEDED
SUM OF ALL RESPONSES TO PHQ9 QUESTIONS 1 AND 2: 0
2. FEELING DOWN, DEPRESSED OR HOPELESS: NOT AT ALL
SUM OF ALL RESPONSES TO PHQ9 QUESTIONS 1 AND 2: 0

## 2022-01-24 ASSESSMENT — PAIN SCALES - GENERAL: PAINLEVEL: 0

## 2022-01-26 ENCOUNTER — EXTERNAL RECORD (OUTPATIENT)
Dept: HEALTH INFORMATION MANAGEMENT | Facility: OTHER | Age: 87
End: 2022-01-26

## 2022-01-26 DIAGNOSIS — Z85.51 HISTORY OF BLADDER CANCER: ICD-10-CM

## 2022-01-26 RX ORDER — TAMSULOSIN HYDROCHLORIDE 0.4 MG/1
0.4 CAPSULE ORAL DAILY
Qty: 90 CAPSULE | Refills: 2 | Status: CANCELLED | OUTPATIENT
Start: 2022-01-26

## 2022-01-28 NOTE — TELEPHONE ENCOUNTER
This encounter is now closed. Tamsulosin refill request was already refilled on 1/18/22 and sent to [facility].

## 2022-02-21 PROBLEM — S52.502P: Status: ACTIVE | Noted: 2022-02-21

## 2022-02-21 PROBLEM — M19.131 POST-TRAUMATIC ARTHRITIS OF RIGHT WRIST: Status: ACTIVE | Noted: 2022-02-21

## 2022-03-01 RX ORDER — GABAPENTIN 100 MG/1
100 CAPSULE ORAL AT BEDTIME
Qty: 90 CAPSULE | Refills: 0 | Status: SHIPPED | OUTPATIENT
Start: 2022-03-01 | End: 2022-05-31

## 2022-04-07 RX ORDER — GABAPENTIN 300 MG/1
CAPSULE ORAL
Qty: 90 CAPSULE | Refills: 0 | Status: SHIPPED | OUTPATIENT
Start: 2022-04-07 | End: 2022-07-14

## 2022-05-30 DIAGNOSIS — E78.2 HYPERLIPEMIA, MIXED: ICD-10-CM

## 2022-05-30 RX ORDER — ATORVASTATIN CALCIUM 40 MG/1
TABLET, FILM COATED ORAL
Qty: 90 TABLET | Refills: 1 | Status: SHIPPED | OUTPATIENT
Start: 2022-05-30 | End: 2023-04-03

## 2022-05-31 RX ORDER — GABAPENTIN 100 MG/1
100 CAPSULE ORAL AT BEDTIME
Qty: 90 CAPSULE | Refills: 0 | Status: SHIPPED | OUTPATIENT
Start: 2022-05-31 | End: 2022-09-08

## 2022-06-02 ENCOUNTER — OFFICE VISIT (OUTPATIENT)
Dept: SURGERY | Facility: CLINIC | Age: 87
End: 2022-06-02
Payer: MEDICARE

## 2022-06-02 DIAGNOSIS — R82.90 URINE FINDING: Primary | ICD-10-CM

## 2022-06-02 DIAGNOSIS — R39.15 URGENCY OF URINATION: ICD-10-CM

## 2022-06-02 DIAGNOSIS — Z85.51 HISTORY OF BLADDER CANCER: ICD-10-CM

## 2022-06-02 LAB
APPEARANCE: CLEAR
BILIRUBIN: NEGATIVE
GLUCOSE (URINE DIPSTICK): NEGATIVE MG/DL
KETONES (URINE DIPSTICK): NEGATIVE MG/DL
LEUKOCYTES: NEGATIVE
MULTISTIX LOT#: ABNORMAL NUMERIC
NITRITE, URINE: NEGATIVE
PH, URINE: 5 (ref 4.5–8)
PROTEIN (URINE DIPSTICK): NEGATIVE MG/DL
SPECIFIC GRAVITY: 1.01 (ref 1–1.03)
URINE-COLOR: YELLOW
UROBILINOGEN,SEMI-QN: 0.2 MG/DL (ref 0–1.9)

## 2022-06-02 PROCEDURE — 99213 OFFICE O/P EST LOW 20 MIN: CPT | Performed by: UROLOGY

## 2022-06-02 PROCEDURE — 81003 URINALYSIS AUTO W/O SCOPE: CPT | Performed by: UROLOGY

## 2022-06-03 LAB — NON GYNE INTERPRETATION: NEGATIVE

## 2022-06-03 NOTE — PROGRESS NOTES
Your recent urine cytology shows no evidence of cancer cells is normal.  Recommend follow up in the office as directed.     Sincerely,  Gerardo Quinn MD

## 2022-07-13 ENCOUNTER — TELEPHONE (OUTPATIENT)
Dept: FAMILY MEDICINE | Age: 87
End: 2022-07-13

## 2022-07-14 RX ORDER — GABAPENTIN 300 MG/1
CAPSULE ORAL
Qty: 90 CAPSULE | Refills: 0 | Status: SHIPPED | OUTPATIENT
Start: 2022-07-14 | End: 2022-10-20

## 2022-07-19 ENCOUNTER — APPOINTMENT (OUTPATIENT)
Dept: FAMILY MEDICINE | Age: 87
End: 2022-07-19

## 2022-07-19 ENCOUNTER — HOSPITAL ENCOUNTER (OUTPATIENT)
Dept: LAB | Age: 87
Discharge: HOME | End: 2022-07-19
Attending: INTERNAL MEDICINE

## 2022-07-19 DIAGNOSIS — Z95.1 HX OF CABG: ICD-10-CM

## 2022-07-19 DIAGNOSIS — I25.41 CORONARY ARTERY ANEURYSM: Primary | ICD-10-CM

## 2022-07-19 DIAGNOSIS — I25.41 CORONARY ARTERY ANEURYSM: ICD-10-CM

## 2022-07-19 DIAGNOSIS — Z86.79 HISTORY OF ATRIAL FIBRILLATION: ICD-10-CM

## 2022-07-19 DIAGNOSIS — Z86.79 ATRIAL FIBRILLATION, CURRENTLY IN SINUS RHYTHM: ICD-10-CM

## 2022-07-19 DIAGNOSIS — E78.2 MIXED HYPERLIPIDEMIA: ICD-10-CM

## 2022-07-19 LAB
CHOLEST SERPL-MCNC: 149 MG/DL
CHOLEST/HDLC SERPL: 2.5 {RATIO}
FASTING DURATION TIME PATIENT: 14 HOURS (ref 0–999)
HDLC SERPL-MCNC: 60 MG/DL
LDLC SERPL CALC-MCNC: 68 MG/DL
NONHDLC SERPL-MCNC: 89 MG/DL
TRIGL SERPL-MCNC: 103 MG/DL

## 2022-07-19 PROCEDURE — 80061 LIPID PANEL: CPT | Performed by: INTERNAL MEDICINE

## 2022-07-19 PROCEDURE — 36415 COLL VENOUS BLD VENIPUNCTURE: CPT | Performed by: INTERNAL MEDICINE

## 2022-07-26 ENCOUNTER — OFFICE VISIT (OUTPATIENT)
Dept: FAMILY MEDICINE | Age: 87
End: 2022-07-26

## 2022-07-26 VITALS
TEMPERATURE: 96.9 F | HEIGHT: 64 IN | RESPIRATION RATE: 16 BRPM | SYSTOLIC BLOOD PRESSURE: 136 MMHG | OXYGEN SATURATION: 97 % | DIASTOLIC BLOOD PRESSURE: 72 MMHG | BODY MASS INDEX: 30.73 KG/M2 | HEART RATE: 71 BPM | WEIGHT: 180 LBS

## 2022-07-26 DIAGNOSIS — E78.2 HYPERLIPEMIA, MIXED: ICD-10-CM

## 2022-07-26 DIAGNOSIS — Z00.00 MEDICARE ANNUAL WELLNESS VISIT, SUBSEQUENT: Primary | ICD-10-CM

## 2022-07-26 PROCEDURE — G0439 PPPS, SUBSEQ VISIT: HCPCS | Performed by: FAMILY MEDICINE

## 2022-07-26 RX ORDER — CLOPIDOGREL BISULFATE 75 MG/1
1 TABLET ORAL EVERY MORNING
COMMUNITY
Start: 2022-01-31

## 2022-07-26 ASSESSMENT — PATIENT HEALTH QUESTIONNAIRE - PHQ9
2. FEELING DOWN, DEPRESSED OR HOPELESS: NOT AT ALL
1. LITTLE INTEREST OR PLEASURE IN DOING THINGS: NOT AT ALL
SUM OF ALL RESPONSES TO PHQ9 QUESTIONS 1 AND 2: 0
SUM OF ALL RESPONSES TO PHQ9 QUESTIONS 1 AND 2: 0
CLINICAL INTERPRETATION OF PHQ2 SCORE: NO FURTHER SCREENING NEEDED

## 2022-07-26 ASSESSMENT — ENCOUNTER SYMPTOMS: BACK PAIN: 1

## 2022-09-08 RX ORDER — GABAPENTIN 100 MG/1
100 CAPSULE ORAL AT BEDTIME
Qty: 90 CAPSULE | Refills: 0 | Status: SHIPPED | OUTPATIENT
Start: 2022-09-08 | End: 2022-12-12

## 2022-09-13 ENCOUNTER — WALK IN (OUTPATIENT)
Dept: URGENT CARE | Age: 87
End: 2022-09-13
Attending: FAMILY MEDICINE

## 2022-09-13 ENCOUNTER — HOSPITAL ENCOUNTER (OUTPATIENT)
Dept: GENERAL RADIOLOGY | Age: 87
Discharge: HOME | End: 2022-09-13
Attending: FAMILY MEDICINE

## 2022-09-13 VITALS
RESPIRATION RATE: 16 BRPM | DIASTOLIC BLOOD PRESSURE: 70 MMHG | OXYGEN SATURATION: 96 % | TEMPERATURE: 97.1 F | BODY MASS INDEX: 30.04 KG/M2 | SYSTOLIC BLOOD PRESSURE: 132 MMHG | WEIGHT: 175 LBS | HEART RATE: 60 BPM

## 2022-09-13 DIAGNOSIS — M25.562 ACUTE PAIN OF LEFT KNEE: ICD-10-CM

## 2022-09-13 DIAGNOSIS — M25.562 ACUTE PAIN OF LEFT KNEE: Primary | ICD-10-CM

## 2022-09-13 PROCEDURE — 99213 OFFICE O/P EST LOW 20 MIN: CPT

## 2022-09-13 PROCEDURE — 73562 X-RAY EXAM OF KNEE 3: CPT

## 2022-09-13 ASSESSMENT — PAIN SCALES - GENERAL
PAINLEVEL: 2
PAINLEVEL_OUTOF10: 2

## 2022-09-13 ASSESSMENT — ENCOUNTER SYMPTOMS
AGITATION: 0
FEVER: 0
VOMITING: 0
WEAKNESS: 0
BACK PAIN: 0
ABDOMINAL PAIN: 0
ADENOPATHY: 0
SHORTNESS OF BREATH: 0
NUMBNESS: 0
NAUSEA: 0

## 2022-09-15 ENCOUNTER — PROCEDURE (OUTPATIENT)
Dept: SURGERY | Facility: CLINIC | Age: 87
End: 2022-09-15
Payer: MEDICARE

## 2022-09-15 DIAGNOSIS — Z85.51 HISTORY OF BLADDER CANCER: Primary | ICD-10-CM

## 2022-09-15 DIAGNOSIS — R39.15 URGENCY OF URINATION: ICD-10-CM

## 2022-09-15 DIAGNOSIS — R79.89 LOW TESTOSTERONE: ICD-10-CM

## 2022-09-15 LAB
APPEARANCE: CLEAR
BILIRUBIN: NEGATIVE
GLUCOSE (URINE DIPSTICK): NEGATIVE MG/DL
KETONES (URINE DIPSTICK): NEGATIVE MG/DL
LEUKOCYTES: NEGATIVE
MULTISTIX LOT#: ABNORMAL NUMERIC
NITRITE, URINE: NEGATIVE
PH, URINE: 5.5 (ref 4.5–8)
PROTEIN (URINE DIPSTICK): NEGATIVE MG/DL
SPECIFIC GRAVITY: 1.02 (ref 1–1.03)
URINE-COLOR: YELLOW
UROBILINOGEN,SEMI-QN: 0.2 MG/DL (ref 0–1.9)

## 2022-09-15 PROCEDURE — 81003 URINALYSIS AUTO W/O SCOPE: CPT | Performed by: UROLOGY

## 2022-09-15 PROCEDURE — 99213 OFFICE O/P EST LOW 20 MIN: CPT | Performed by: UROLOGY

## 2022-09-15 RX ORDER — CIPROFLOXACIN 500 MG/1
500 TABLET, FILM COATED ORAL ONCE
Status: DISCONTINUED | OUTPATIENT
Start: 2022-09-15 | End: 2022-09-15

## 2022-09-29 ASSESSMENT — ENCOUNTER SYMPTOMS
DIARRHEA: 0
BRUISES/BLEEDS EASILY: 0
SINUS PAIN: 0
WOUND: 0
SHORTNESS OF BREATH: 0
CHILLS: 0
WHEEZING: 0
AGITATION: 0
NAUSEA: 0
CHEST TIGHTNESS: 0
BACK PAIN: 0
VOMITING: 0
SORE THROAT: 0
WEAKNESS: 0
SEIZURES: 0
FATIGUE: 0
ADENOPATHY: 0
NERVOUS/ANXIOUS: 0
PHOTOPHOBIA: 0
ACTIVITY CHANGE: 0
EYE REDNESS: 0
COLOR CHANGE: 0
SLEEP DISTURBANCE: 0
NUMBNESS: 0
DIZZINESS: 0
SINUS PRESSURE: 0
ABDOMINAL PAIN: 0
FEVER: 0
CONSTIPATION: 0
HEADACHES: 0
EYE PAIN: 0

## 2022-09-30 ENCOUNTER — OFFICE VISIT (OUTPATIENT)
Dept: SPORTS MEDICINE | Age: 87
End: 2022-09-30
Attending: FAMILY MEDICINE

## 2022-09-30 VITALS
DIASTOLIC BLOOD PRESSURE: 81 MMHG | BODY MASS INDEX: 30.73 KG/M2 | SYSTOLIC BLOOD PRESSURE: 149 MMHG | HEIGHT: 64 IN | HEART RATE: 61 BPM | WEIGHT: 180 LBS

## 2022-09-30 DIAGNOSIS — M23.92 ACUTE INTERNAL DERANGEMENT OF LEFT KNEE: ICD-10-CM

## 2022-09-30 DIAGNOSIS — M25.562 ACUTE PAIN OF LEFT KNEE: Primary | ICD-10-CM

## 2022-09-30 DIAGNOSIS — M25.662 DECREASED RANGE OF MOTION (ROM) OF LEFT KNEE: ICD-10-CM

## 2022-09-30 DIAGNOSIS — M17.12 PRIMARY OSTEOARTHRITIS OF LEFT KNEE: ICD-10-CM

## 2022-09-30 PROCEDURE — 99204 OFFICE O/P NEW MOD 45 MIN: CPT | Performed by: FAMILY MEDICINE

## 2022-09-30 PROCEDURE — 20611 DRAIN/INJ JOINT/BURSA W/US: CPT | Performed by: FAMILY MEDICINE

## 2022-09-30 RX ORDER — BUPIVACAINE HYDROCHLORIDE 5 MG/ML
2 INJECTION, SOLUTION PERINEURAL
Status: COMPLETED | OUTPATIENT
Start: 2022-09-30 | End: 2022-09-30

## 2022-09-30 RX ORDER — LIDOCAINE HYDROCHLORIDE 10 MG/ML
2 INJECTION, SOLUTION INFILTRATION; PERINEURAL
Status: COMPLETED | OUTPATIENT
Start: 2022-09-30 | End: 2022-09-30

## 2022-09-30 RX ORDER — TRIAMCINOLONE ACETONIDE 40 MG/ML
40 INJECTION, SUSPENSION INTRA-ARTICULAR; INTRAMUSCULAR
Status: COMPLETED | OUTPATIENT
Start: 2022-09-30 | End: 2022-09-30

## 2022-09-30 RX ADMIN — LIDOCAINE HYDROCHLORIDE 2 ML: 10 INJECTION, SOLUTION INFILTRATION; PERINEURAL at 10:12

## 2022-09-30 RX ADMIN — TRIAMCINOLONE ACETONIDE 40 MG: 40 INJECTION, SUSPENSION INTRA-ARTICULAR; INTRAMUSCULAR at 10:12

## 2022-09-30 RX ADMIN — BUPIVACAINE HYDROCHLORIDE 2 ML: 5 INJECTION, SOLUTION PERINEURAL at 10:12

## 2022-10-20 RX ORDER — GABAPENTIN 300 MG/1
CAPSULE ORAL
Qty: 90 CAPSULE | Refills: 0 | Status: SHIPPED | OUTPATIENT
Start: 2022-10-20 | End: 2023-01-23

## 2022-11-03 ENCOUNTER — APPOINTMENT (OUTPATIENT)
Dept: SPORTS MEDICINE | Age: 87
End: 2022-11-03

## 2022-11-04 ENCOUNTER — APPOINTMENT (OUTPATIENT)
Dept: SPORTS MEDICINE | Age: 87
End: 2022-11-04

## 2022-11-04 DIAGNOSIS — Z85.51 HISTORY OF BLADDER CANCER: ICD-10-CM

## 2022-11-04 RX ORDER — TAMSULOSIN HYDROCHLORIDE 0.4 MG/1
0.4 CAPSULE ORAL DAILY
Qty: 90 CAPSULE | Refills: 2 | Status: SHIPPED | OUTPATIENT
Start: 2022-11-04

## 2022-12-12 RX ORDER — GABAPENTIN 100 MG/1
100 CAPSULE ORAL AT BEDTIME
Qty: 90 CAPSULE | Refills: 0 | Status: SHIPPED | OUTPATIENT
Start: 2022-12-12 | End: 2023-04-03

## 2023-01-12 ENCOUNTER — HOSPITAL ENCOUNTER (OUTPATIENT)
Dept: LAB | Age: 88
Discharge: HOME | End: 2023-01-12
Attending: INTERNAL MEDICINE

## 2023-01-12 DIAGNOSIS — E78.2 MIXED HYPERLIPIDEMIA: ICD-10-CM

## 2023-01-12 DIAGNOSIS — Z95.1 PRESENCE OF AORTOCORONARY BYPASS GRAFT: ICD-10-CM

## 2023-01-12 DIAGNOSIS — I25.41 CORONARY ARTERY ANEURYSM: ICD-10-CM

## 2023-01-12 DIAGNOSIS — Z86.79 PERSONAL HISTORY OF OTHER DISEASES OF THE CIRCULATORY SYSTEM: ICD-10-CM

## 2023-01-12 LAB
ALT SERPL-CCNC: 22 UNITS/L
AST SERPL-CCNC: 16 UNITS/L
CHOLEST SERPL-MCNC: 165 MG/DL
CHOLEST/HDLC SERPL: 2.6 {RATIO}
FASTING DURATION TIME PATIENT: 12 HOURS (ref 0–999)
HDLC SERPL-MCNC: 63 MG/DL
LDLC SERPL CALC-MCNC: 83 MG/DL
NONHDLC SERPL-MCNC: 102 MG/DL
TRIGL SERPL-MCNC: 96 MG/DL

## 2023-01-12 PROCEDURE — 80061 LIPID PANEL: CPT | Performed by: INTERNAL MEDICINE

## 2023-01-12 PROCEDURE — 82306 VITAMIN D 25 HYDROXY: CPT

## 2023-01-12 PROCEDURE — 80053 COMPREHEN METABOLIC PANEL: CPT

## 2023-01-12 PROCEDURE — 36415 COLL VENOUS BLD VENIPUNCTURE: CPT | Performed by: INTERNAL MEDICINE

## 2023-01-12 PROCEDURE — 84450 TRANSFERASE (AST) (SGOT): CPT | Performed by: INTERNAL MEDICINE

## 2023-01-12 PROCEDURE — 84460 ALANINE AMINO (ALT) (SGPT): CPT | Performed by: INTERNAL MEDICINE

## 2023-01-16 ENCOUNTER — LAB SERVICES (OUTPATIENT)
Dept: FAMILY MEDICINE | Age: 88
End: 2023-01-16

## 2023-01-16 DIAGNOSIS — E55.9 VITAMIN D DEFICIENCY: ICD-10-CM

## 2023-01-16 DIAGNOSIS — E78.2 HYPERLIPEMIA, MIXED: Primary | ICD-10-CM

## 2023-01-16 DIAGNOSIS — I10 BENIGN ESSENTIAL HYPERTENSION: ICD-10-CM

## 2023-01-17 DIAGNOSIS — I10 BENIGN ESSENTIAL HYPERTENSION: ICD-10-CM

## 2023-01-17 DIAGNOSIS — E55.9 VITAMIN D DEFICIENCY: ICD-10-CM

## 2023-01-17 DIAGNOSIS — E78.2 HYPERLIPEMIA, MIXED: ICD-10-CM

## 2023-01-17 LAB
25(OH)D3+25(OH)D2 SERPL-MCNC: 76.6 NG/ML (ref 30–100)
ALBUMIN SERPL-MCNC: 3.9 G/DL (ref 3.6–5.1)
ALBUMIN/GLOB SERPL: 1.1 {RATIO} (ref 1–2.4)
ALP SERPL-CCNC: 54 UNITS/L (ref 45–117)
ALT SERPL-CCNC: 23 UNITS/L
ANION GAP SERPL CALC-SCNC: 13 MMOL/L (ref 7–19)
AST SERPL-CCNC: 17 UNITS/L
BILIRUB SERPL-MCNC: 0.8 MG/DL (ref 0.2–1)
BUN SERPL-MCNC: 16 MG/DL (ref 6–20)
BUN/CREAT SERPL: 17 (ref 7–25)
CALCIUM SERPL-MCNC: 9.5 MG/DL (ref 8.4–10.2)
CHLORIDE SERPL-SCNC: 107 MMOL/L (ref 97–110)
CO2 SERPL-SCNC: 26 MMOL/L (ref 21–32)
CREAT SERPL-MCNC: 0.94 MG/DL (ref 0.67–1.17)
FASTING DURATION TIME PATIENT: ABNORMAL H
GFR SERPLBLD BASED ON 1.73 SQ M-ARVRAT: 79 ML/MIN
GLOBULIN SER-MCNC: 3.4 G/DL (ref 2–4)
GLUCOSE SERPL-MCNC: 104 MG/DL (ref 70–99)
POTASSIUM SERPL-SCNC: 4.4 MMOL/L (ref 3.4–5.1)
PROT SERPL-MCNC: 7.3 G/DL (ref 6.4–8.2)
SODIUM SERPL-SCNC: 142 MMOL/L (ref 135–145)

## 2023-01-18 ENCOUNTER — TELEPHONE (OUTPATIENT)
Dept: FAMILY MEDICINE | Age: 88
End: 2023-01-18

## 2023-01-20 ENCOUNTER — APPOINTMENT (OUTPATIENT)
Dept: FAMILY MEDICINE | Age: 88
End: 2023-01-20

## 2023-01-23 RX ORDER — GABAPENTIN 300 MG/1
CAPSULE ORAL
Qty: 90 CAPSULE | Refills: 0 | Status: SHIPPED | OUTPATIENT
Start: 2023-01-23 | End: 2023-05-24

## 2023-01-31 ENCOUNTER — TELEPHONE (OUTPATIENT)
Dept: INTERNAL MEDICINE | Age: 88
End: 2023-01-31

## 2023-01-31 ENCOUNTER — OFFICE VISIT (OUTPATIENT)
Dept: FAMILY MEDICINE | Age: 88
End: 2023-01-31

## 2023-01-31 VITALS
BODY MASS INDEX: 31.41 KG/M2 | HEART RATE: 56 BPM | WEIGHT: 184 LBS | SYSTOLIC BLOOD PRESSURE: 126 MMHG | OXYGEN SATURATION: 98 % | TEMPERATURE: 96.6 F | DIASTOLIC BLOOD PRESSURE: 64 MMHG | RESPIRATION RATE: 16 BRPM | HEIGHT: 64 IN

## 2023-01-31 DIAGNOSIS — Z09 FOLLOW-UP EXAM: Primary | ICD-10-CM

## 2023-01-31 DIAGNOSIS — M19.90 ARTHRITIS: ICD-10-CM

## 2023-01-31 DIAGNOSIS — R13.14 PHARYNGOESOPHAGEAL DYSPHAGIA: Primary | ICD-10-CM

## 2023-01-31 DIAGNOSIS — R13.14 PHARYNGOESOPHAGEAL DYSPHAGIA: ICD-10-CM

## 2023-01-31 DIAGNOSIS — Z87.891 HISTORY OF TOBACCO USE: ICD-10-CM

## 2023-01-31 DIAGNOSIS — I10 BENIGN ESSENTIAL HYPERTENSION: ICD-10-CM

## 2023-01-31 DIAGNOSIS — M48.061 SPINAL STENOSIS OF LUMBAR REGION WITHOUT NEUROGENIC CLAUDICATION: ICD-10-CM

## 2023-01-31 DIAGNOSIS — E78.2 HYPERLIPEMIA, MIXED: ICD-10-CM

## 2023-01-31 PROCEDURE — 99213 OFFICE O/P EST LOW 20 MIN: CPT | Performed by: FAMILY MEDICINE

## 2023-01-31 RX ORDER — AMLODIPINE BESYLATE 5 MG/1
1 TABLET ORAL EVERY MORNING
COMMUNITY
Start: 2022-12-27

## 2023-01-31 ASSESSMENT — PATIENT HEALTH QUESTIONNAIRE - PHQ9
SUM OF ALL RESPONSES TO PHQ9 QUESTIONS 1 AND 2: 0
SUM OF ALL RESPONSES TO PHQ9 QUESTIONS 1 AND 2: 0
2. FEELING DOWN, DEPRESSED OR HOPELESS: NOT AT ALL
1. LITTLE INTEREST OR PLEASURE IN DOING THINGS: NOT AT ALL
CLINICAL INTERPRETATION OF PHQ2 SCORE: NO FURTHER SCREENING NEEDED

## 2023-01-31 ASSESSMENT — ENCOUNTER SYMPTOMS
SORE THROAT: 0
TROUBLE SWALLOWING: 1
DIARRHEA: 0
SLEEP DISTURBANCE: 1
BACK PAIN: 1
NAUSEA: 0
UNEXPECTED WEIGHT CHANGE: 0
CHOKING: 0
COUGH: 1
FATIGUE: 0
ABDOMINAL PAIN: 0

## 2023-02-01 ENCOUNTER — TELEPHONE (OUTPATIENT)
Dept: SCHEDULING | Age: 88
End: 2023-02-01

## 2023-02-16 ENCOUNTER — HOSPITAL ENCOUNTER (OUTPATIENT)
Dept: GENERAL RADIOLOGY | Age: 88
Discharge: HOME | End: 2023-02-16
Attending: FAMILY MEDICINE

## 2023-02-16 ENCOUNTER — TELEPHONE (OUTPATIENT)
Dept: FAMILY MEDICINE | Age: 88
End: 2023-02-16

## 2023-02-16 DIAGNOSIS — R13.14 PHARYNGOESOPHAGEAL DYSPHAGIA: ICD-10-CM

## 2023-02-16 PROCEDURE — 92611 MOTION FLUOROSCOPY/SWALLOW: CPT

## 2023-02-16 PROCEDURE — 74230 X-RAY XM SWLNG FUNCJ C+: CPT

## 2023-02-27 ENCOUNTER — OFFICE VISIT (OUTPATIENT)
Dept: FAMILY MEDICINE | Age: 88
End: 2023-02-27

## 2023-02-27 VITALS
SYSTOLIC BLOOD PRESSURE: 136 MMHG | WEIGHT: 184 LBS | HEART RATE: 80 BPM | RESPIRATION RATE: 16 BRPM | HEIGHT: 64 IN | TEMPERATURE: 96.2 F | DIASTOLIC BLOOD PRESSURE: 72 MMHG | OXYGEN SATURATION: 98 % | BODY MASS INDEX: 31.41 KG/M2

## 2023-02-27 DIAGNOSIS — I10 BENIGN ESSENTIAL HYPERTENSION: ICD-10-CM

## 2023-02-27 DIAGNOSIS — Z09 FOLLOW-UP EXAM: ICD-10-CM

## 2023-02-27 DIAGNOSIS — E78.2 HYPERLIPEMIA, MIXED: ICD-10-CM

## 2023-02-27 DIAGNOSIS — I71.40 ABDOMINAL AORTIC ANEURYSM (AAA) WITHOUT RUPTURE, UNSPECIFIED PART (CMD): ICD-10-CM

## 2023-02-27 DIAGNOSIS — M19.90 ARTHRITIS: ICD-10-CM

## 2023-02-27 DIAGNOSIS — R13.10 DYSPHAGIA, UNSPECIFIED TYPE: ICD-10-CM

## 2023-02-27 DIAGNOSIS — R25.2 LEG CRAMP: ICD-10-CM

## 2023-02-27 DIAGNOSIS — E55.9 VITAMIN D DEFICIENCY: Primary | ICD-10-CM

## 2023-02-27 PROBLEM — Z87.891 HISTORY OF TOBACCO USE: Status: RESOLVED | Noted: 2023-01-31 | Resolved: 2023-02-27

## 2023-02-27 PROCEDURE — 99213 OFFICE O/P EST LOW 20 MIN: CPT | Performed by: FAMILY MEDICINE

## 2023-02-27 RX ORDER — CLOPIDOGREL BISULFATE 75 MG/1
75 TABLET ORAL
COMMUNITY
Start: 2023-02-22 | End: 2023-02-27 | Stop reason: SDUPTHER

## 2023-02-27 RX ORDER — METOPROLOL SUCCINATE 25 MG/1
25 TABLET, EXTENDED RELEASE ORAL
COMMUNITY
Start: 2023-02-13 | End: 2023-02-27 | Stop reason: SDUPTHER

## 2023-02-27 ASSESSMENT — ENCOUNTER SYMPTOMS
ALLERGIC/IMMUNOLOGIC NEGATIVE: 1
EYES NEGATIVE: 1
NEUROLOGICAL NEGATIVE: 1
ENDOCRINE NEGATIVE: 1
TROUBLE SWALLOWING: 1
GASTROINTESTINAL NEGATIVE: 1
RESPIRATORY NEGATIVE: 1
CONSTITUTIONAL NEGATIVE: 1
PSYCHIATRIC NEGATIVE: 1
HEMATOLOGIC/LYMPHATIC NEGATIVE: 1

## 2023-02-27 ASSESSMENT — PATIENT HEALTH QUESTIONNAIRE - PHQ9
1. LITTLE INTEREST OR PLEASURE IN DOING THINGS: NOT AT ALL
SUM OF ALL RESPONSES TO PHQ9 QUESTIONS 1 AND 2: 0
SUM OF ALL RESPONSES TO PHQ9 QUESTIONS 1 AND 2: 0
CLINICAL INTERPRETATION OF PHQ2 SCORE: NO FURTHER SCREENING NEEDED
2. FEELING DOWN, DEPRESSED OR HOPELESS: NOT AT ALL

## 2023-02-27 ASSESSMENT — PAIN SCALES - GENERAL: PAINLEVEL: 4

## 2023-04-03 DIAGNOSIS — E78.2 HYPERLIPEMIA, MIXED: ICD-10-CM

## 2023-04-03 RX ORDER — ATORVASTATIN CALCIUM 40 MG/1
TABLET, FILM COATED ORAL
Qty: 90 TABLET | Refills: 1 | Status: SHIPPED | OUTPATIENT
Start: 2023-04-03

## 2023-04-03 RX ORDER — GABAPENTIN 100 MG/1
100 CAPSULE ORAL AT BEDTIME
Qty: 90 CAPSULE | Refills: 0 | Status: SHIPPED | OUTPATIENT
Start: 2023-04-03 | End: 2023-07-18

## 2023-05-02 ENCOUNTER — WALK IN (OUTPATIENT)
Dept: URGENT CARE | Age: 88
End: 2023-05-02
Attending: FAMILY MEDICINE

## 2023-05-02 VITALS
TEMPERATURE: 97.6 F | SYSTOLIC BLOOD PRESSURE: 132 MMHG | DIASTOLIC BLOOD PRESSURE: 71 MMHG | OXYGEN SATURATION: 96 % | HEART RATE: 61 BPM | RESPIRATION RATE: 14 BRPM

## 2023-05-02 DIAGNOSIS — J02.9 SORE THROAT: Primary | ICD-10-CM

## 2023-05-02 PROCEDURE — 99212 OFFICE O/P EST SF 10 MIN: CPT

## 2023-05-02 RX ORDER — AMOXICILLIN 875 MG/1
875 TABLET, COATED ORAL 2 TIMES DAILY
Qty: 20 TABLET | Refills: 0 | Status: SHIPPED | OUTPATIENT
Start: 2023-05-02 | End: 2023-05-12

## 2023-05-02 ASSESSMENT — ENCOUNTER SYMPTOMS
NAUSEA: 0
EYE REDNESS: 0
DIZZINESS: 0
VOMITING: 0
DIARRHEA: 0
SORE THROAT: 1
ABDOMINAL PAIN: 0
WEAKNESS: 0
HEADACHES: 0
SINUS PRESSURE: 0
COUGH: 1
SHORTNESS OF BREATH: 0
AGITATION: 0
FEVER: 0
ADENOPATHY: 0

## 2023-05-02 ASSESSMENT — PAIN SCALES - GENERAL: PAINLEVEL_OUTOF10: 0

## 2023-05-24 RX ORDER — GABAPENTIN 300 MG/1
CAPSULE ORAL
Qty: 90 CAPSULE | Refills: 0 | Status: SHIPPED | OUTPATIENT
Start: 2023-05-24 | End: 2023-09-08

## 2023-06-07 ENCOUNTER — TELEPHONE (OUTPATIENT)
Dept: URGENT CARE | Age: 88
End: 2023-06-07

## 2023-06-07 ENCOUNTER — WALK IN (OUTPATIENT)
Dept: URGENT CARE | Age: 88
End: 2023-06-07
Attending: EMERGENCY MEDICINE

## 2023-06-07 VITALS
TEMPERATURE: 98.9 F | HEART RATE: 67 BPM | OXYGEN SATURATION: 96 % | DIASTOLIC BLOOD PRESSURE: 66 MMHG | SYSTOLIC BLOOD PRESSURE: 124 MMHG | RESPIRATION RATE: 16 BRPM | WEIGHT: 180 LBS | BODY MASS INDEX: 30.9 KG/M2

## 2023-06-07 DIAGNOSIS — J06.9 VIRAL URI WITH COUGH: ICD-10-CM

## 2023-06-07 DIAGNOSIS — U07.1 COVID-19 VIRUS INFECTION: Primary | ICD-10-CM

## 2023-06-07 LAB
FLUAV RNA RESP QL NAA+PROBE: NOT DETECTED
FLUBV RNA RESP QL NAA+PROBE: NOT DETECTED
RSV AG NPH QL IA.RAPID: NOT DETECTED
SARS-COV-2 RNA RESP QL NAA+PROBE: DETECTED

## 2023-06-07 PROCEDURE — C9803 HOPD COVID-19 SPEC COLLECT: HCPCS

## 2023-06-07 PROCEDURE — 99212 OFFICE O/P EST SF 10 MIN: CPT

## 2023-06-07 PROCEDURE — 0241U POCT COVID/FLU/RSV PANEL: CPT | Performed by: EMERGENCY MEDICINE

## 2023-06-07 ASSESSMENT — PAIN SCALES - GENERAL
PAINLEVEL: 0
PAINLEVEL_OUTOF10: 0

## 2023-07-18 RX ORDER — GABAPENTIN 100 MG/1
100 CAPSULE ORAL AT BEDTIME
Qty: 90 CAPSULE | Refills: 0 | Status: SHIPPED | OUTPATIENT
Start: 2023-07-18 | End: 2023-09-15

## 2023-07-31 ENCOUNTER — HOSPITAL ENCOUNTER (OUTPATIENT)
Dept: GENERAL RADIOLOGY | Age: 88
Discharge: HOME | End: 2023-07-31
Attending: EMERGENCY MEDICINE

## 2023-07-31 ENCOUNTER — WALK IN (OUTPATIENT)
Dept: URGENT CARE | Age: 88
End: 2023-07-31
Attending: EMERGENCY MEDICINE

## 2023-07-31 VITALS
TEMPERATURE: 98.3 F | WEIGHT: 175 LBS | DIASTOLIC BLOOD PRESSURE: 76 MMHG | OXYGEN SATURATION: 96 % | SYSTOLIC BLOOD PRESSURE: 141 MMHG | RESPIRATION RATE: 16 BRPM | BODY MASS INDEX: 30.04 KG/M2 | HEART RATE: 62 BPM

## 2023-07-31 DIAGNOSIS — S20.211A CONTUSION OF RIGHT CHEST WALL, INITIAL ENCOUNTER: ICD-10-CM

## 2023-07-31 DIAGNOSIS — S22.41XA CLOSED FRACTURE OF MULTIPLE RIBS OF RIGHT SIDE, INITIAL ENCOUNTER: Primary | ICD-10-CM

## 2023-07-31 PROCEDURE — 99212 OFFICE O/P EST SF 10 MIN: CPT

## 2023-07-31 PROCEDURE — 71101 X-RAY EXAM UNILAT RIBS/CHEST: CPT

## 2023-07-31 RX ORDER — HYDROCODONE BITARTRATE AND ACETAMINOPHEN 10; 325 MG/1; MG/1
0.5 TABLET ORAL EVERY 6 HOURS PRN
Qty: 5 TABLET | Refills: 0 | Status: SHIPPED | OUTPATIENT
Start: 2023-07-31 | End: 2023-09-12 | Stop reason: SDUPTHER

## 2023-07-31 ASSESSMENT — PAIN SCALES - GENERAL
PAINLEVEL: 8
PAINLEVEL_OUTOF10: 8

## 2023-08-16 ENCOUNTER — APPOINTMENT (OUTPATIENT)
Dept: CARDIOLOGY | Age: 88
DRG: 247 | End: 2023-08-16
Attending: INTERNAL MEDICINE

## 2023-08-16 ENCOUNTER — APPOINTMENT (OUTPATIENT)
Dept: GENERAL RADIOLOGY | Age: 88
DRG: 247 | End: 2023-08-16
Attending: STUDENT IN AN ORGANIZED HEALTH CARE EDUCATION/TRAINING PROGRAM

## 2023-08-16 ENCOUNTER — HOSPITAL ENCOUNTER (INPATIENT)
Age: 88
LOS: 1 days | Discharge: HOME | DRG: 247 | End: 2023-08-18
Attending: STUDENT IN AN ORGANIZED HEALTH CARE EDUCATION/TRAINING PROGRAM | Admitting: FAMILY MEDICINE

## 2023-08-16 DIAGNOSIS — I25.9 CHEST PAIN DUE TO MYOCARDIAL ISCHEMIA, UNSPECIFIED ISCHEMIC CHEST PAIN TYPE: Primary | ICD-10-CM

## 2023-08-16 DIAGNOSIS — Z98.61 S/P PTCA (PERCUTANEOUS TRANSLUMINAL CORONARY ANGIOPLASTY): ICD-10-CM

## 2023-08-16 PROBLEM — R07.9 CHEST PAIN: Status: ACTIVE | Noted: 2023-08-16

## 2023-08-16 LAB
ALBUMIN SERPL-MCNC: 3.3 G/DL (ref 3.6–5.1)
ALBUMIN/GLOB SERPL: 1 {RATIO} (ref 1–2.4)
ALP SERPL-CCNC: 82 UNITS/L (ref 45–117)
ALT SERPL-CCNC: 23 UNITS/L
ANION GAP SERPL CALC-SCNC: 8 MMOL/L (ref 7–19)
AORTIC VALVE AREA (AVA): 0.86
ASCENDING AORTA (AAD): 3
AST SERPL-CCNC: 16 UNITS/L
ATRIAL RATE (BPM): 56
AV STENOSIS SEVERITY TEXT: NORMAL
AVI LVOT PEAK GRADIENT (LVOTMG): 1.2
BASOPHILS # BLD: 0 K/MCL (ref 0–0.3)
BASOPHILS NFR BLD: 1 %
BILIRUB SERPL-MCNC: 0.6 MG/DL (ref 0.2–1)
BUN SERPL-MCNC: 13 MG/DL (ref 6–20)
BUN/CREAT SERPL: 14 (ref 7–25)
CALCIUM SERPL-MCNC: 8.7 MG/DL (ref 8.4–10.2)
CHLORIDE SERPL-SCNC: 111 MMOL/L (ref 97–110)
CO2 SERPL-SCNC: 26 MMOL/L (ref 21–32)
CREAT SERPL-MCNC: 0.96 MG/DL (ref 0.67–1.17)
DEPRECATED RDW RBC: 52.1 FL (ref 39–50)
E WAVE DECELARATION TIME (MDT): 12.18
EGFRCR SERPLBLD CKD-EPI 2021: 77 ML/MIN/{1.73_M2}
EOSINOPHIL # BLD: 0.2 K/MCL (ref 0–0.5)
EOSINOPHIL NFR BLD: 3 %
ERYTHROCYTE [DISTWIDTH] IN BLOOD: 14.5 % (ref 11–15)
FASTING DURATION TIME PATIENT: ABNORMAL H
GLOBULIN SER-MCNC: 3.4 G/DL (ref 2–4)
GLUCOSE SERPL-MCNC: 104 MG/DL (ref 70–99)
HCT VFR BLD CALC: 29 % (ref 39–51)
HGB BLD-MCNC: 9.6 G/DL (ref 13–17)
IMM GRANULOCYTES # BLD AUTO: 0 K/MCL (ref 0–0.2)
IMM GRANULOCYTES # BLD: 0 %
INTERVENTRICULAR SEPTUM IN END DIASTOLE (IVSD): 2.7
LEFT INTERNAL DIMENSION IN SYSTOLE (LVSD): 1.2
LEFT VENTRICULAR INTERNAL DIMENSION IN DIASTOLE (LVDD): 3.3
LEFT VENTRICULAR POSTERIOR WALL IN END DIASTOLE (LVPW): 5.2
LV EF: NORMAL %
LVOT 2D (LVOTD): 28.3
LVOT VTI (LVOTVTI): 1.14
LYMPHOCYTES # BLD: 1.9 K/MCL (ref 1–4)
LYMPHOCYTES NFR BLD: 26 %
MCH RBC QN AUTO: 32.5 PG (ref 26–34)
MCHC RBC AUTO-ENTMCNC: 33.1 G/DL (ref 32–36.5)
MCV RBC AUTO: 98.3 FL (ref 78–100)
MONOCYTES # BLD: 0.6 K/MCL (ref 0.3–0.9)
MONOCYTES NFR BLD: 9 %
MV E TISSUE VEL MED (MESV): 11.5
MV E WAVE VEL/E TISSUE VEL MED(MSR): 7.07
MV PEAK A VELOCITY (MVPAV): 239
MV PEAK E VELOCITY (MVPEV): 0.99
NEUTROPHILS # BLD: 4.3 K/MCL (ref 1.8–7.7)
NEUTROPHILS NFR BLD: 61 %
NRBC BLD MANUAL-RTO: 0 /100 WBC
P AXIS (DEGREES): 66
PLATELET # BLD AUTO: 290 K/MCL (ref 140–450)
POTASSIUM SERPL-SCNC: 3.9 MMOL/L (ref 3.4–5.1)
PR-INTERVAL (MSEC): 196
PROT SERPL-MCNC: 6.7 G/DL (ref 6.4–8.2)
QRS-INTERVAL (MSEC): 108
QT-INTERVAL (MSEC): 424
QTC: 409
R AXIS (DEGREES): -9
RAINBOW EXTRA TUBES HOLD SPECIMEN: NORMAL
RAINBOW EXTRA TUBES HOLD SPECIMEN: NORMAL
RBC # BLD: 2.95 MIL/MCL (ref 4.5–5.9)
REPORT TEXT: NORMAL
SODIUM SERPL-SCNC: 141 MMOL/L (ref 135–145)
T AXIS (DEGREES): 36
TRICUSPID VALVE ANNULAR PEAK VELOCITY (TVAPV): 30
TROPONIN I SERPL DL<=0.01 NG/ML-MCNC: 11 NG/L
TROPONIN I SERPL DL<=0.01 NG/ML-MCNC: 12 NG/L
TROPONIN I SERPL DL<=0.01 NG/ML-MCNC: 12 NG/L
TV ESTIMATED RIGHT ARTERIAL PRESSURE (RAP): 17.2
VENTRICULAR RATE EKG/MIN (BPM): 56
WBC # BLD: 7 K/MCL (ref 4.2–11)

## 2023-08-16 PROCEDURE — 96360 HYDRATION IV INFUSION INIT: CPT

## 2023-08-16 PROCEDURE — G0378 HOSPITAL OBSERVATION PER HR: HCPCS

## 2023-08-16 PROCEDURE — 71045 X-RAY EXAM CHEST 1 VIEW: CPT

## 2023-08-16 PROCEDURE — 10004651 HB RX, NO CHARGE ITEM: Performed by: STUDENT IN AN ORGANIZED HEALTH CARE EDUCATION/TRAINING PROGRAM

## 2023-08-16 PROCEDURE — 93306 TTE W/DOPPLER COMPLETE: CPT

## 2023-08-16 PROCEDURE — 99285 EMERGENCY DEPT VISIT HI MDM: CPT

## 2023-08-16 PROCEDURE — 85025 COMPLETE CBC W/AUTO DIFF WBC: CPT | Performed by: STUDENT IN AN ORGANIZED HEALTH CARE EDUCATION/TRAINING PROGRAM

## 2023-08-16 PROCEDURE — 84484 ASSAY OF TROPONIN QUANT: CPT | Performed by: STUDENT IN AN ORGANIZED HEALTH CARE EDUCATION/TRAINING PROGRAM

## 2023-08-16 PROCEDURE — 80053 COMPREHEN METABOLIC PANEL: CPT | Performed by: STUDENT IN AN ORGANIZED HEALTH CARE EDUCATION/TRAINING PROGRAM

## 2023-08-16 PROCEDURE — 84484 ASSAY OF TROPONIN QUANT: CPT | Performed by: FAMILY MEDICINE

## 2023-08-16 PROCEDURE — 10004651 HB RX, NO CHARGE ITEM: Performed by: PHYSICIAN ASSISTANT

## 2023-08-16 PROCEDURE — 84484 ASSAY OF TROPONIN QUANT: CPT | Performed by: INTERNAL MEDICINE

## 2023-08-16 PROCEDURE — 99223 1ST HOSP IP/OBS HIGH 75: CPT | Performed by: FAMILY MEDICINE

## 2023-08-16 PROCEDURE — 93005 ELECTROCARDIOGRAM TRACING: CPT | Performed by: STUDENT IN AN ORGANIZED HEALTH CARE EDUCATION/TRAINING PROGRAM

## 2023-08-16 PROCEDURE — 10002807 HB RX 258: Performed by: PHYSICIAN ASSISTANT

## 2023-08-16 PROCEDURE — 36415 COLL VENOUS BLD VENIPUNCTURE: CPT | Performed by: INTERNAL MEDICINE

## 2023-08-16 RX ORDER — ASPIRIN 81 MG/1
81 TABLET ORAL EVERY EVENING
COMMUNITY

## 2023-08-16 RX ORDER — SODIUM CHLORIDE 9 MG/ML
INJECTION, SOLUTION INTRAVENOUS CONTINUOUS
Status: ACTIVE | OUTPATIENT
Start: 2023-08-16 | End: 2023-08-16

## 2023-08-16 RX ORDER — 0.9 % SODIUM CHLORIDE 0.9 %
2 VIAL (ML) INJECTION EVERY 12 HOURS SCHEDULED
Status: DISCONTINUED | OUTPATIENT
Start: 2023-08-16 | End: 2023-08-17 | Stop reason: HOSPADM

## 2023-08-16 RX ORDER — ASPIRIN 81 MG/1
243 TABLET, CHEWABLE ORAL ONCE
Status: COMPLETED | OUTPATIENT
Start: 2023-08-16 | End: 2023-08-16

## 2023-08-16 RX ORDER — ASPIRIN 325 MG
325 TABLET ORAL ONCE
Status: COMPLETED | OUTPATIENT
Start: 2023-08-16 | End: 2023-08-17

## 2023-08-16 RX ORDER — HYDRALAZINE HYDROCHLORIDE 20 MG/ML
10 INJECTION INTRAMUSCULAR; INTRAVENOUS EVERY 4 HOURS PRN
Status: DISCONTINUED | OUTPATIENT
Start: 2023-08-16 | End: 2023-08-17 | Stop reason: HOSPADM

## 2023-08-16 RX ORDER — CLONIDINE HYDROCHLORIDE 0.1 MG/1
0.1 TABLET ORAL EVERY 4 HOURS PRN
Status: DISCONTINUED | OUTPATIENT
Start: 2023-08-16 | End: 2023-08-17 | Stop reason: HOSPADM

## 2023-08-16 RX ORDER — ASPIRIN 81 MG/1
81 TABLET, CHEWABLE ORAL ONCE
Status: COMPLETED | OUTPATIENT
Start: 2023-08-16 | End: 2023-08-16

## 2023-08-16 RX ADMIN — SODIUM CHLORIDE: 9 INJECTION, SOLUTION INTRAVENOUS at 14:54

## 2023-08-16 RX ADMIN — SODIUM CHLORIDE, PRESERVATIVE FREE 2 ML: 5 INJECTION INTRAVENOUS at 23:13

## 2023-08-16 RX ADMIN — ASPIRIN 81 MG 243 MG: 81 TABLET ORAL at 10:22

## 2023-08-16 RX ADMIN — ASPIRIN 81 MG 81 MG: 81 TABLET ORAL at 10:25

## 2023-08-16 RX ADMIN — SODIUM CHLORIDE, PRESERVATIVE FREE 2 ML: 5 INJECTION INTRAVENOUS at 14:54

## 2023-08-16 SDOH — ECONOMIC STABILITY: FOOD INSECURITY: HOW OFTEN IN THE PAST 12 MONTHS WERE YOU WORRIED OR STRESSED ABOUT HAVING ENOUGH MONEY TO BUY NUTRITIOUS MEALS?: NEVER

## 2023-08-16 SDOH — ECONOMIC STABILITY: TRANSPORTATION INSECURITY
IN THE PAST 12 MONTHS, HAS THE LACK OF TRANSPORTATION KEPT YOU FROM MEDICAL APPOINTMENTS OR FROM GETTING MEDICATIONS?: NO

## 2023-08-16 SDOH — ECONOMIC STABILITY: HOUSING INSECURITY: WHAT IS YOUR LIVING SITUATION TODAY?: HOUSE

## 2023-08-16 SDOH — ECONOMIC STABILITY: HOUSING INSECURITY: ARE YOU WORRIED ABOUT LOSING YOUR HOUSING?: NO

## 2023-08-16 SDOH — ECONOMIC STABILITY: HOUSING INSECURITY: WHAT IS YOUR LIVING SITUATION TODAY?: SPOUSE

## 2023-08-16 SDOH — HEALTH STABILITY: PHYSICAL HEALTH: DO YOU HAVE SERIOUS DIFFICULTY WALKING OR CLIMBING STAIRS?: NO

## 2023-08-16 SDOH — SOCIAL STABILITY: SOCIAL NETWORK: SUPPORT SYSTEMS: FAMILY MEMBERS

## 2023-08-16 SDOH — ECONOMIC STABILITY: TRANSPORTATION INSECURITY
IN THE PAST 12 MONTHS, HAS LACK OF TRANSPORTATION KEPT YOU FROM MEETINGS, WORK, OR FROM GETTING THINGS NEEDED FOR DAILY LIVING?: NO

## 2023-08-16 SDOH — HEALTH STABILITY: GENERAL: BECAUSE OF A PHYSICAL, MENTAL, OR EMOTIONAL CONDITION, DO YOU HAVE DIFFICULTY DOING ERRANDS ALONE?: NO

## 2023-08-16 SDOH — SOCIAL STABILITY: SOCIAL NETWORK
HOW OFTEN DO YOU SEE OR TALK TO PEOPLE THAT YOU CARE ABOUT AND FEEL CLOSE TO? (FOR EXAMPLE: TALKING TO FRIENDS ON THE PHONE, VISITING FRIENDS OR FAMILY, GOING TO CHURCH OR CLUB MEETINGS): 3 TO 5 TIMES A WEEK

## 2023-08-16 SDOH — HEALTH STABILITY: PHYSICAL HEALTH: DO YOU HAVE DIFFICULTY DRESSING OR BATHING?: NO

## 2023-08-16 SDOH — HEALTH STABILITY: GENERAL
BECAUSE OF A PHYSICAL, MENTAL, OR EMOTIONAL CONDITION, DO YOU HAVE SERIOUS DIFFICULTY CONCENTRATING, REMEMBERING OR MAKING DECISIONS?: NO

## 2023-08-16 ASSESSMENT — LIFESTYLE VARIABLES
HOW OFTEN DO YOU HAVE A DRINK CONTAINING ALCOHOL: NEVER
HOW MANY STANDARD DRINKS CONTAINING ALCOHOL DO YOU HAVE ON A TYPICAL DAY: 0,1 OR 2
ALCOHOL_USE_STATUS: NO OR LOW RISK WITH VALIDATED TOOL
AUDIT-C TOTAL SCORE: 0
HOW OFTEN DO YOU HAVE 6 OR MORE DRINKS ON ONE OCCASION: NEVER

## 2023-08-16 ASSESSMENT — PAIN SCALES - GENERAL
PAINLEVEL_OUTOF10: 5
PAINLEVEL_OUTOF10: 0
PAINLEVEL_OUTOF10: 2

## 2023-08-16 ASSESSMENT — PATIENT HEALTH QUESTIONNAIRE - PHQ9
SUM OF ALL RESPONSES TO PHQ9 QUESTIONS 1 AND 2: 0
2. FEELING DOWN, DEPRESSED OR HOPELESS: NOT AT ALL
IS PATIENT ABLE TO COMPLETE PHQ2 OR PHQ9: YES
1. LITTLE INTEREST OR PLEASURE IN DOING THINGS: NOT AT ALL
CLINICAL INTERPRETATION OF PHQ2 SCORE: NO FURTHER SCREENING NEEDED
SUM OF ALL RESPONSES TO PHQ9 QUESTIONS 1 AND 2: 0

## 2023-08-16 ASSESSMENT — COLUMBIA-SUICIDE SEVERITY RATING SCALE - C-SSRS
1. WITHIN THE PAST MONTH, HAVE YOU WISHED YOU WERE DEAD OR WISHED YOU COULD GO TO SLEEP AND NOT WAKE UP?: NO
6. HAVE YOU EVER DONE ANYTHING, STARTED TO DO ANYTHING, OR PREPARED TO DO ANYTHING TO END YOUR LIFE?: NO
2. HAVE YOU ACTUALLY HAD ANY THOUGHTS OF KILLING YOURSELF?: NO

## 2023-08-16 ASSESSMENT — ACTIVITIES OF DAILY LIVING (ADL)
ADL_BEFORE_ADMISSION: INDEPENDENT
ADL_SHORT_OF_BREATH: YES
RECENT_DECLINE_ADL: NO
ADL_SCORE: 12

## 2023-08-16 ASSESSMENT — HEART SCORE
EKG: NORMAL
RISK FACTORS: EQUAL OR GREATER  THAN 3 RISK FACTORS OR HISTORY OF ATHEROSCLEROTIC DISEASE
TROPONIN: EQUAL OR LESS THAN NORMAL LIMIT
HEART SCORE: 6
AGE: EQUAL OR GREATER THAN 65
HISTORY: HIGHLY SUSPICIOUS

## 2023-08-16 ASSESSMENT — COGNITIVE AND FUNCTIONAL STATUS - GENERAL
BECAUSE OF A PHYSICAL, MENTAL, OR EMOTIONAL CONDITION, DO YOU HAVE SERIOUS DIFFICULTY CONCENTRATING, REMEMBERING OR MAKING DECISIONS: NO
BECAUSE OF A PHYSICAL, MENTAL, OR EMOTIONAL CONDITION, DO YOU HAVE DIFFICULTY DOING ERRANDS ALONE: NO
DO YOU HAVE DIFFICULTY DRESSING OR BATHING: NO
DO YOU HAVE SERIOUS DIFFICULTY WALKING OR CLIMBING STAIRS: NO

## 2023-08-17 DIAGNOSIS — Z98.61 S/P PTCA (PERCUTANEOUS TRANSLUMINAL CORONARY ANGIOPLASTY): ICD-10-CM

## 2023-08-17 DIAGNOSIS — Z95.5 S/P DRUG ELUTING CORONARY STENT PLACEMENT: Primary | ICD-10-CM

## 2023-08-17 LAB
BASOPHILS # BLD: 0 K/MCL (ref 0–0.3)
BASOPHILS NFR BLD: 1 %
DEPRECATED RDW RBC: 51 FL (ref 39–50)
EOSINOPHIL # BLD: 0.2 K/MCL (ref 0–0.5)
EOSINOPHIL NFR BLD: 3 %
ERYTHROCYTE [DISTWIDTH] IN BLOOD: 14.4 % (ref 11–15)
HCT VFR BLD CALC: 27.5 % (ref 39–51)
HGB BLD-MCNC: 9.1 G/DL (ref 13–17)
IMM GRANULOCYTES # BLD AUTO: 0 K/MCL (ref 0–0.2)
IMM GRANULOCYTES # BLD: 0 %
LYMPHOCYTES # BLD: 1.9 K/MCL (ref 1–4)
LYMPHOCYTES NFR BLD: 27 %
MCH RBC QN AUTO: 32.4 PG (ref 26–34)
MCHC RBC AUTO-ENTMCNC: 33.1 G/DL (ref 32–36.5)
MCV RBC AUTO: 97.9 FL (ref 78–100)
MONOCYTES # BLD: 0.7 K/MCL (ref 0.3–0.9)
MONOCYTES NFR BLD: 10 %
NEUTROPHILS # BLD: 4.1 K/MCL (ref 1.8–7.7)
NEUTROPHILS NFR BLD: 59 %
NRBC BLD MANUAL-RTO: 0 /100 WBC
PLATELET # BLD AUTO: 279 K/MCL (ref 140–450)
RBC # BLD: 2.81 MIL/MCL (ref 4.5–5.9)
WBC # BLD: 6.9 K/MCL (ref 4.2–11)

## 2023-08-17 PROCEDURE — 027034Z DILATION OF CORONARY ARTERY, ONE ARTERY WITH DRUG-ELUTING INTRALUMINAL DEVICE, PERCUTANEOUS APPROACH: ICD-10-PCS | Performed by: INTERNAL MEDICINE

## 2023-08-17 PROCEDURE — 10004651 HB RX, NO CHARGE ITEM: Performed by: INTERNAL MEDICINE

## 2023-08-17 PROCEDURE — B2131ZZ FLUOROSCOPY OF MULTIPLE CORONARY ARTERY BYPASS GRAFTS USING LOW OSMOLAR CONTRAST: ICD-10-PCS | Performed by: INTERNAL MEDICINE

## 2023-08-17 PROCEDURE — 10002801 HB RX 250 W/O HCPCS: Performed by: INTERNAL MEDICINE

## 2023-08-17 PROCEDURE — 92978 ENDOLUMINL IVUS OCT C 1ST: CPT | Performed by: INTERNAL MEDICINE

## 2023-08-17 PROCEDURE — 92937 PRQ TRLUML REVSC CAB GRF 1: CPT | Performed by: INTERNAL MEDICINE

## 2023-08-17 PROCEDURE — C1760 CLOSURE DEV, VASC: HCPCS | Performed by: INTERNAL MEDICINE

## 2023-08-17 PROCEDURE — C1894 INTRO/SHEATH, NON-LASER: HCPCS | Performed by: INTERNAL MEDICINE

## 2023-08-17 PROCEDURE — 93459 L HRT ART/GRFT ANGIO: CPT | Performed by: INTERNAL MEDICINE

## 2023-08-17 PROCEDURE — 10002805 HB CONTRAST AGENT: Performed by: INTERNAL MEDICINE

## 2023-08-17 PROCEDURE — 99153 MOD SED SAME PHYS/QHP EA: CPT | Performed by: INTERNAL MEDICINE

## 2023-08-17 PROCEDURE — C1887 CATHETER, GUIDING: HCPCS | Performed by: INTERNAL MEDICINE

## 2023-08-17 PROCEDURE — 99152 MOD SED SAME PHYS/QHP 5/>YRS: CPT | Performed by: INTERNAL MEDICINE

## 2023-08-17 PROCEDURE — 10002807 HB RX 258: Performed by: INTERNAL MEDICINE

## 2023-08-17 PROCEDURE — 85025 COMPLETE CBC W/AUTO DIFF WBC: CPT | Performed by: FAMILY MEDICINE

## 2023-08-17 PROCEDURE — 10002803 HB RX 637: Performed by: INTERNAL MEDICINE

## 2023-08-17 PROCEDURE — 10004651 HB RX, NO CHARGE ITEM: Performed by: PHYSICIAN ASSISTANT

## 2023-08-17 PROCEDURE — C1725 CATH, TRANSLUMIN NON-LASER: HCPCS | Performed by: INTERNAL MEDICINE

## 2023-08-17 PROCEDURE — 10006023 HB SUPPLY 272: Performed by: INTERNAL MEDICINE

## 2023-08-17 PROCEDURE — G0378 HOSPITAL OBSERVATION PER HR: HCPCS

## 2023-08-17 PROCEDURE — C1753 CATH, INTRAVAS ULTRASOUND: HCPCS | Performed by: INTERNAL MEDICINE

## 2023-08-17 PROCEDURE — 4A023N7 MEASUREMENT OF CARDIAC SAMPLING AND PRESSURE, LEFT HEART, PERCUTANEOUS APPROACH: ICD-10-PCS | Performed by: INTERNAL MEDICINE

## 2023-08-17 PROCEDURE — 10006031 HB ROOM CHARGE TELEMETRY

## 2023-08-17 PROCEDURE — C1769 GUIDE WIRE: HCPCS | Performed by: INTERNAL MEDICINE

## 2023-08-17 PROCEDURE — B240ZZ3 ULTRASONOGRAPHY OF SINGLE CORONARY ARTERY, INTRAVASCULAR: ICD-10-PCS | Performed by: INTERNAL MEDICINE

## 2023-08-17 PROCEDURE — 10002803 HB RX 637: Performed by: FAMILY MEDICINE

## 2023-08-17 PROCEDURE — 36415 COLL VENOUS BLD VENIPUNCTURE: CPT | Performed by: FAMILY MEDICINE

## 2023-08-17 PROCEDURE — B2111ZZ FLUOROSCOPY OF MULTIPLE CORONARY ARTERIES USING LOW OSMOLAR CONTRAST: ICD-10-PCS | Performed by: INTERNAL MEDICINE

## 2023-08-17 PROCEDURE — C1874 STENT, COATED/COV W/DEL SYS: HCPCS | Performed by: INTERNAL MEDICINE

## 2023-08-17 PROCEDURE — 10002800 HB RX 250 W HCPCS: Performed by: INTERNAL MEDICINE

## 2023-08-17 PROCEDURE — 10006027 HB SUPPLY 278: Performed by: INTERNAL MEDICINE

## 2023-08-17 PROCEDURE — 99233 SBSQ HOSP IP/OBS HIGH 50: CPT | Performed by: FAMILY MEDICINE

## 2023-08-17 DEVICE — ANGIO-SEAL VIP VASCULAR CLOSURE DEVICE
Type: IMPLANTABLE DEVICE | Site: GROIN | Status: FUNCTIONAL
Brand: ANGIO-SEAL

## 2023-08-17 DEVICE — STENT ONYXNG35012UX ONYX 3.50X12RX
Type: IMPLANTABLE DEVICE | Site: VEIN GRAFT | Status: FUNCTIONAL
Brand: ONYX FRONTIER™

## 2023-08-17 RX ORDER — METOPROLOL SUCCINATE 50 MG/1
50 TABLET, EXTENDED RELEASE ORAL EVERY MORNING
Status: DISCONTINUED | OUTPATIENT
Start: 2023-08-18 | End: 2023-08-17

## 2023-08-17 RX ORDER — ATORVASTATIN CALCIUM 40 MG/1
40 TABLET, FILM COATED ORAL DAILY
Status: DISCONTINUED | OUTPATIENT
Start: 2023-08-17 | End: 2023-08-18 | Stop reason: HOSPADM

## 2023-08-17 RX ORDER — GABAPENTIN 300 MG/1
300 CAPSULE ORAL AT BEDTIME
Status: DISCONTINUED | OUTPATIENT
Start: 2023-08-17 | End: 2023-08-18 | Stop reason: HOSPADM

## 2023-08-17 RX ORDER — HYDRALAZINE HYDROCHLORIDE 20 MG/ML
10 INJECTION INTRAMUSCULAR; INTRAVENOUS EVERY 4 HOURS PRN
Status: ACTIVE | OUTPATIENT
Start: 2023-08-17 | End: 2023-08-18

## 2023-08-17 RX ORDER — ACETAMINOPHEN 325 MG/1
650 TABLET ORAL EVERY 4 HOURS PRN
Status: DISCONTINUED | OUTPATIENT
Start: 2023-08-17 | End: 2023-08-18 | Stop reason: HOSPADM

## 2023-08-17 RX ORDER — 0.9 % SODIUM CHLORIDE 0.9 %
2 VIAL (ML) INJECTION EVERY 12 HOURS SCHEDULED
Status: DISCONTINUED | OUTPATIENT
Start: 2023-08-17 | End: 2023-08-18 | Stop reason: HOSPADM

## 2023-08-17 RX ORDER — CLOPIDOGREL BISULFATE 75 MG/1
75 TABLET ORAL EVERY MORNING
Status: DISCONTINUED | OUTPATIENT
Start: 2023-08-17 | End: 2023-08-17 | Stop reason: SDUPTHER

## 2023-08-17 RX ORDER — CLONIDINE HYDROCHLORIDE 0.1 MG/1
0.1 TABLET ORAL EVERY 4 HOURS PRN
Status: ACTIVE | OUTPATIENT
Start: 2023-08-17 | End: 2023-08-18

## 2023-08-17 RX ORDER — TAMSULOSIN HYDROCHLORIDE 0.4 MG/1
0.4 CAPSULE ORAL EVERY EVENING
Status: DISCONTINUED | OUTPATIENT
Start: 2023-08-17 | End: 2023-08-18 | Stop reason: HOSPADM

## 2023-08-17 RX ORDER — ASPIRIN 81 MG/1
81 TABLET, CHEWABLE ORAL DAILY
Status: DISCONTINUED | OUTPATIENT
Start: 2023-08-18 | End: 2023-08-18 | Stop reason: HOSPADM

## 2023-08-17 RX ORDER — ACETAMINOPHEN 650 MG/1
650 SUPPOSITORY RECTAL EVERY 4 HOURS PRN
Status: DISCONTINUED | OUTPATIENT
Start: 2023-08-17 | End: 2023-08-18 | Stop reason: HOSPADM

## 2023-08-17 RX ORDER — NITROGLYCERIN 0.4 MG/1
0.4 TABLET SUBLINGUAL EVERY 5 MIN PRN
Status: ACTIVE | OUTPATIENT
Start: 2023-08-17 | End: 2023-08-18

## 2023-08-17 RX ORDER — ENOXAPARIN SODIUM 100 MG/ML
40 INJECTION SUBCUTANEOUS DAILY
Status: DISCONTINUED | OUTPATIENT
Start: 2023-08-17 | End: 2023-08-18 | Stop reason: HOSPADM

## 2023-08-17 RX ORDER — METOPROLOL SUCCINATE 25 MG/1
25 TABLET, EXTENDED RELEASE ORAL EVERY MORNING
Status: DISCONTINUED | OUTPATIENT
Start: 2023-08-17 | End: 2023-08-17

## 2023-08-17 RX ORDER — GABAPENTIN 100 MG/1
100 CAPSULE ORAL AT BEDTIME
Status: DISCONTINUED | OUTPATIENT
Start: 2023-08-17 | End: 2023-08-18 | Stop reason: HOSPADM

## 2023-08-17 RX ORDER — SODIUM CHLORIDE 9 MG/ML
INJECTION, SOLUTION INTRAVENOUS CONTINUOUS
Status: ACTIVE | OUTPATIENT
Start: 2023-08-17 | End: 2023-08-17

## 2023-08-17 RX ORDER — CLOPIDOGREL BISULFATE 75 MG/1
TABLET ORAL PRN
Status: DISCONTINUED | OUTPATIENT
Start: 2023-08-17 | End: 2023-08-17 | Stop reason: HOSPADM

## 2023-08-17 RX ORDER — MIDAZOLAM HYDROCHLORIDE 1 MG/ML
INJECTION, SOLUTION INTRAMUSCULAR; INTRAVENOUS PRN
Status: DISCONTINUED | OUTPATIENT
Start: 2023-08-17 | End: 2023-08-17 | Stop reason: HOSPADM

## 2023-08-17 RX ORDER — CLOPIDOGREL BISULFATE 75 MG/1
75 TABLET ORAL DAILY
Status: DISCONTINUED | OUTPATIENT
Start: 2023-08-18 | End: 2023-08-18 | Stop reason: HOSPADM

## 2023-08-17 RX ORDER — AMLODIPINE BESYLATE 5 MG/1
5 TABLET ORAL EVERY MORNING
Status: DISCONTINUED | OUTPATIENT
Start: 2023-08-17 | End: 2023-08-18 | Stop reason: HOSPADM

## 2023-08-17 RX ORDER — LIDOCAINE HYDROCHLORIDE 20 MG/ML
INJECTION, SOLUTION EPIDURAL; INFILTRATION; INTRACAUDAL; PERINEURAL PRN
Status: DISCONTINUED | OUTPATIENT
Start: 2023-08-17 | End: 2023-08-17 | Stop reason: HOSPADM

## 2023-08-17 RX ORDER — ASPIRIN 81 MG/1
81 TABLET ORAL EVERY EVENING
Status: DISCONTINUED | OUTPATIENT
Start: 2023-08-17 | End: 2023-08-17 | Stop reason: SDUPTHER

## 2023-08-17 RX ORDER — METOPROLOL SUCCINATE 50 MG/1
50 TABLET, EXTENDED RELEASE ORAL EVERY MORNING
Status: DISCONTINUED | OUTPATIENT
Start: 2023-08-17 | End: 2023-08-18 | Stop reason: HOSPADM

## 2023-08-17 RX ORDER — ISOSORBIDE MONONITRATE 30 MG/1
30 TABLET, EXTENDED RELEASE ORAL DAILY
Status: DISCONTINUED | OUTPATIENT
Start: 2023-08-17 | End: 2023-08-18 | Stop reason: HOSPADM

## 2023-08-17 RX ADMIN — METOPROLOL SUCCINATE 50 MG: 50 TABLET, EXTENDED RELEASE ORAL at 14:49

## 2023-08-17 RX ADMIN — SODIUM CHLORIDE: 9 INJECTION, SOLUTION INTRAVENOUS at 09:39

## 2023-08-17 RX ADMIN — ISOSORBIDE MONONITRATE 30 MG: 30 TABLET, EXTENDED RELEASE ORAL at 12:43

## 2023-08-17 RX ADMIN — SODIUM CHLORIDE, PRESERVATIVE FREE 2 ML: 5 INJECTION INTRAVENOUS at 09:39

## 2023-08-17 RX ADMIN — TAMSULOSIN HYDROCHLORIDE 0.4 MG: 0.4 CAPSULE ORAL at 17:35

## 2023-08-17 RX ADMIN — ASPIRIN 325 MG ORAL TABLET 325 MG: 325 PILL ORAL at 06:56

## 2023-08-17 RX ADMIN — TAMSULOSIN HYDROCHLORIDE 0.4 MG: 0.4 CAPSULE ORAL at 01:20

## 2023-08-17 SDOH — HEALTH STABILITY: PHYSICAL HEALTH: DO YOU HAVE SERIOUS DIFFICULTY WALKING OR CLIMBING STAIRS?: NO

## 2023-08-17 SDOH — HEALTH STABILITY: GENERAL: BECAUSE OF A PHYSICAL, MENTAL, OR EMOTIONAL CONDITION, DO YOU HAVE DIFFICULTY DOING ERRANDS ALONE?: NO

## 2023-08-17 SDOH — HEALTH STABILITY: PHYSICAL HEALTH: DO YOU HAVE DIFFICULTY DRESSING OR BATHING?: NO

## 2023-08-17 SDOH — ECONOMIC STABILITY: GENERAL

## 2023-08-17 ASSESSMENT — ORIENTATION MEMORY CONCENTRATION TEST (OMCT)
OMCT SCORE: 0
WHAT MONTH IS IT NOW: CORRECT
WHAT TIME IS IT (NO WATCH OR CLOCK): CORRECT
COUNT BACKWARDS FROM 20 TO 1: CORRECT
SAY THE MONTHS IN REVERSE ORDER STARTING WITH LAST MONTH: CORRECT
WHAT YEAR IS IT NOW (MUST BE EXACT): CORRECT
OMCT INTERPRETATION: 0-6: NO SIGNIFICANT IMPAIRMENT
REPEAT THE NAME AND ADDRESS I ASKED YOU TO REMEMBER: CORRECT

## 2023-08-17 ASSESSMENT — PAIN SCALES - GENERAL
PAINLEVEL_OUTOF10: 0

## 2023-08-18 VITALS
HEIGHT: 64 IN | RESPIRATION RATE: 16 BRPM | HEART RATE: 53 BPM | DIASTOLIC BLOOD PRESSURE: 73 MMHG | SYSTOLIC BLOOD PRESSURE: 147 MMHG | OXYGEN SATURATION: 97 % | WEIGHT: 181.66 LBS | BODY MASS INDEX: 31.01 KG/M2 | TEMPERATURE: 97.9 F

## 2023-08-18 LAB
ANION GAP SERPL CALC-SCNC: 9 MMOL/L (ref 7–19)
BUN SERPL-MCNC: 14 MG/DL (ref 6–20)
BUN/CREAT SERPL: 15 (ref 7–25)
CALCIUM SERPL-MCNC: 8.6 MG/DL (ref 8.4–10.2)
CHLORIDE SERPL-SCNC: 112 MMOL/L (ref 97–110)
CO2 SERPL-SCNC: 24 MMOL/L (ref 21–32)
CREAT SERPL-MCNC: 0.92 MG/DL (ref 0.67–1.17)
DEPRECATED RDW RBC: 49.6 FL (ref 39–50)
EGFRCR SERPLBLD CKD-EPI 2021: 81 ML/MIN/{1.73_M2}
ERYTHROCYTE [DISTWIDTH] IN BLOOD: 14.3 % (ref 11–15)
FASTING DURATION TIME PATIENT: ABNORMAL H
GLUCOSE SERPL-MCNC: 97 MG/DL (ref 70–99)
HCT VFR BLD CALC: 26.5 % (ref 39–51)
HGB BLD-MCNC: 8.6 G/DL (ref 13–17)
MCH RBC QN AUTO: 31.4 PG (ref 26–34)
MCHC RBC AUTO-ENTMCNC: 32.5 G/DL (ref 32–36.5)
MCV RBC AUTO: 96.7 FL (ref 78–100)
NRBC BLD MANUAL-RTO: 0 /100 WBC
PLATELET # BLD AUTO: 274 K/MCL (ref 140–450)
POTASSIUM SERPL-SCNC: 4.1 MMOL/L (ref 3.4–5.1)
RBC # BLD: 2.74 MIL/MCL (ref 4.5–5.9)
SODIUM SERPL-SCNC: 141 MMOL/L (ref 135–145)
WBC # BLD: 7 K/MCL (ref 4.2–11)

## 2023-08-18 PROCEDURE — 85027 COMPLETE CBC AUTOMATED: CPT | Performed by: INTERNAL MEDICINE

## 2023-08-18 PROCEDURE — 99239 HOSP IP/OBS DSCHRG MGMT >30: CPT | Performed by: FAMILY MEDICINE

## 2023-08-18 PROCEDURE — 10004651 HB RX, NO CHARGE ITEM: Performed by: INTERNAL MEDICINE

## 2023-08-18 PROCEDURE — 10002800 HB RX 250 W HCPCS: Performed by: FAMILY MEDICINE

## 2023-08-18 PROCEDURE — 10002803 HB RX 637: Performed by: INTERNAL MEDICINE

## 2023-08-18 PROCEDURE — 36415 COLL VENOUS BLD VENIPUNCTURE: CPT | Performed by: INTERNAL MEDICINE

## 2023-08-18 PROCEDURE — 80048 BASIC METABOLIC PNL TOTAL CA: CPT | Performed by: INTERNAL MEDICINE

## 2023-08-18 PROCEDURE — 10002803 HB RX 637: Performed by: FAMILY MEDICINE

## 2023-08-18 PROCEDURE — 96372 THER/PROPH/DIAG INJ SC/IM: CPT | Performed by: FAMILY MEDICINE

## 2023-08-18 RX ORDER — ISOSORBIDE MONONITRATE 30 MG/1
30 TABLET, EXTENDED RELEASE ORAL DAILY
Qty: 30 TABLET | Refills: 0 | Status: SHIPPED | OUTPATIENT
Start: 2023-08-19

## 2023-08-18 RX ORDER — METOPROLOL SUCCINATE 50 MG/1
50 TABLET, EXTENDED RELEASE ORAL EVERY MORNING
Qty: 30 TABLET | Refills: 0 | Status: SHIPPED | OUTPATIENT
Start: 2023-08-19

## 2023-08-18 RX ADMIN — ASPIRIN 81 MG 81 MG: 81 TABLET ORAL at 08:20

## 2023-08-18 RX ADMIN — AMLODIPINE BESYLATE 5 MG: 5 TABLET ORAL at 08:20

## 2023-08-18 RX ADMIN — SODIUM CHLORIDE, PRESERVATIVE FREE 2 ML: 5 INJECTION INTRAVENOUS at 08:20

## 2023-08-18 RX ADMIN — ATORVASTATIN CALCIUM 40 MG: 40 TABLET, FILM COATED ORAL at 08:20

## 2023-08-18 RX ADMIN — METOPROLOL SUCCINATE 50 MG: 50 TABLET, EXTENDED RELEASE ORAL at 08:20

## 2023-08-18 RX ADMIN — ENOXAPARIN SODIUM 40 MG: 40 INJECTION SUBCUTANEOUS at 08:20

## 2023-08-18 RX ADMIN — ISOSORBIDE MONONITRATE 30 MG: 30 TABLET, EXTENDED RELEASE ORAL at 08:20

## 2023-08-18 RX ADMIN — CLOPIDOGREL BISULFATE 75 MG: 75 TABLET, FILM COATED ORAL at 08:20

## 2023-08-18 ASSESSMENT — PAIN SCALES - GENERAL: PAINLEVEL_OUTOF10: 0

## 2023-08-22 ENCOUNTER — TELEPHONE (OUTPATIENT)
Dept: CARE COORDINATION | Age: 88
End: 2023-08-22

## 2023-08-23 ENCOUNTER — CASE MANAGEMENT (OUTPATIENT)
Dept: CARE COORDINATION | Age: 88
End: 2023-08-23

## 2023-08-25 ENCOUNTER — TELEPHONE (OUTPATIENT)
Dept: CARDIAC REHAB | Age: 88
End: 2023-08-25

## 2023-08-29 ENCOUNTER — TELEPHONE (OUTPATIENT)
Dept: CARE COORDINATION | Age: 88
End: 2023-08-29

## 2023-09-05 ENCOUNTER — TELEPHONE (OUTPATIENT)
Dept: CARE COORDINATION | Age: 88
End: 2023-09-05

## 2023-09-07 ENCOUNTER — APPOINTMENT (OUTPATIENT)
Dept: CARDIAC REHAB | Age: 88
End: 2023-09-07

## 2023-09-08 RX ORDER — GABAPENTIN 300 MG/1
300 CAPSULE ORAL AT BEDTIME
Qty: 90 CAPSULE | Refills: 1 | Status: SHIPPED | OUTPATIENT
Start: 2023-09-08 | End: 2023-09-12 | Stop reason: SDUPTHER

## 2023-09-12 ENCOUNTER — OFFICE VISIT (OUTPATIENT)
Dept: FAMILY MEDICINE | Age: 88
End: 2023-09-12

## 2023-09-12 ENCOUNTER — TELEPHONE (OUTPATIENT)
Dept: CARE COORDINATION | Age: 88
End: 2023-09-12

## 2023-09-12 VITALS
WEIGHT: 182 LBS | BODY MASS INDEX: 31.07 KG/M2 | DIASTOLIC BLOOD PRESSURE: 78 MMHG | OXYGEN SATURATION: 98 % | RESPIRATION RATE: 16 BRPM | SYSTOLIC BLOOD PRESSURE: 110 MMHG | HEIGHT: 64 IN | HEART RATE: 57 BPM | TEMPERATURE: 98.3 F

## 2023-09-12 DIAGNOSIS — S22.41XA CLOSED FRACTURE OF MULTIPLE RIBS OF RIGHT SIDE, INITIAL ENCOUNTER: ICD-10-CM

## 2023-09-12 DIAGNOSIS — Z23 IMMUNIZATION DUE: ICD-10-CM

## 2023-09-12 DIAGNOSIS — Z86.79 ATRIAL FIBRILLATION, CURRENTLY IN SINUS RHYTHM: ICD-10-CM

## 2023-09-12 DIAGNOSIS — Z00.00 MEDICARE ANNUAL WELLNESS VISIT, SUBSEQUENT: Primary | ICD-10-CM

## 2023-09-12 DIAGNOSIS — D64.9 ANEMIA, UNSPECIFIED TYPE: ICD-10-CM

## 2023-09-12 PROCEDURE — 90662 IIV NO PRSV INCREASED AG IM: CPT | Performed by: FAMILY MEDICINE

## 2023-09-12 PROCEDURE — 99213 OFFICE O/P EST LOW 20 MIN: CPT | Performed by: FAMILY MEDICINE

## 2023-09-12 PROCEDURE — G0008 ADMIN INFLUENZA VIRUS VAC: HCPCS | Performed by: FAMILY MEDICINE

## 2023-09-12 PROCEDURE — G0439 PPPS, SUBSEQ VISIT: HCPCS | Performed by: FAMILY MEDICINE

## 2023-09-12 RX ORDER — HYDROCODONE BITARTRATE AND ACETAMINOPHEN 10; 325 MG/1; MG/1
0.5 TABLET ORAL EVERY 6 HOURS PRN
Qty: 5 TABLET | Refills: 0 | Status: SHIPPED | OUTPATIENT
Start: 2023-09-12 | End: 2023-09-15 | Stop reason: SDUPTHER

## 2023-09-12 RX ORDER — GABAPENTIN 300 MG/1
300 CAPSULE ORAL AT BEDTIME
Qty: 90 CAPSULE | Refills: 1 | OUTPATIENT
Start: 2023-09-12

## 2023-09-12 RX ORDER — HYDROCODONE BITARTRATE AND ACETAMINOPHEN 10; 325 MG/1; MG/1
0.5 TABLET ORAL EVERY 6 HOURS PRN
Qty: 5 TABLET | Refills: 0 | OUTPATIENT
Start: 2023-09-12 | End: 2023-09-12 | Stop reason: SDUPTHER

## 2023-09-12 RX ORDER — METOPROLOL SUCCINATE 25 MG/1
TABLET, EXTENDED RELEASE ORAL
COMMUNITY
Start: 2023-09-04

## 2023-09-12 ASSESSMENT — ACTIVITIES OF DAILY LIVING (ADL)
RECENT_DECLINE_ADL: NO
SENSORY_SUPPORT_DEVICES: HEARING AIDS
ADL_SCORE: 12
ADL_BEFORE_ADMISSION: INDEPENDENT
ADL_SHORT_OF_BREATH: NO
NEEDS_ASSIST: NO

## 2023-09-12 ASSESSMENT — COGNITIVE AND FUNCTIONAL STATUS - GENERAL
ARE YOU BLIND OR DO YOU HAVE SERIOUS DIFFICULTY SEEING, EVEN WHEN WEARING GLASSES: NO
ARE YOU DEAF OR DO YOU HAVE SERIOUS DIFFICULTY  HEARING: NO

## 2023-09-12 ASSESSMENT — MINI COG
PATIENT ABLE TO REPEAT THE 3 WORDS GIVEN PREVIOUSLY?: WAS ABLE TO REPEAT BACK 3 WORDS CORRECTLY
TOTAL SCORE: 5
PATIENT WAS GIVEN REPEAT BACK WORDS FROM VERSION: 1 - BANANA SUNRISE CHAIR
PATIENT ABLE TO FILL IN THE CLOCK FACE WITH 10 MINUTES PAST 11 O'CLOCK?: YES, CLOCK IS CORRECT

## 2023-09-12 ASSESSMENT — ENCOUNTER SYMPTOMS
ALLERGIC/IMMUNOLOGIC NEGATIVE: 1
FATIGUE: 1
RESPIRATORY NEGATIVE: 1
EYES NEGATIVE: 1
NEUROLOGICAL NEGATIVE: 1

## 2023-09-12 ASSESSMENT — PATIENT HEALTH QUESTIONNAIRE - PHQ9
CLINICAL INTERPRETATION OF PHQ2 SCORE: NO FURTHER SCREENING NEEDED
1. LITTLE INTEREST OR PLEASURE IN DOING THINGS: NOT AT ALL
2. FEELING DOWN, DEPRESSED OR HOPELESS: NOT AT ALL
SUM OF ALL RESPONSES TO PHQ9 QUESTIONS 1 AND 2: 0
SUM OF ALL RESPONSES TO PHQ9 QUESTIONS 1 AND 2: 0

## 2023-09-12 ASSESSMENT — PAIN SCALES - GENERAL: PAINLEVEL: 0

## 2023-09-13 ENCOUNTER — TELEPHONE (OUTPATIENT)
Dept: FAMILY MEDICINE | Age: 88
End: 2023-09-13

## 2023-09-15 DIAGNOSIS — S22.41XA CLOSED FRACTURE OF MULTIPLE RIBS OF RIGHT SIDE, INITIAL ENCOUNTER: ICD-10-CM

## 2023-09-15 RX ORDER — HYDROCODONE BITARTRATE AND ACETAMINOPHEN 10; 325 MG/1; MG/1
0.5 TABLET ORAL EVERY 6 HOURS PRN
Qty: 5 TABLET | Refills: 0 | Status: SHIPPED | OUTPATIENT
Start: 2023-09-15

## 2023-09-15 RX ORDER — GABAPENTIN 100 MG/1
100 CAPSULE ORAL AT BEDTIME
Qty: 90 CAPSULE | Refills: 0 | Status: SHIPPED | OUTPATIENT
Start: 2023-09-15

## 2023-09-19 ENCOUNTER — TELEPHONE (OUTPATIENT)
Dept: CARE COORDINATION | Age: 88
End: 2023-09-19

## 2023-09-19 ENCOUNTER — CASE MANAGEMENT (OUTPATIENT)
Dept: CARE COORDINATION | Age: 88
End: 2023-09-19

## 2023-09-26 ENCOUNTER — OFFICE VISIT (OUTPATIENT)
Dept: HEMATOLOGY/ONCOLOGY | Age: 88
End: 2023-09-26
Attending: FAMILY MEDICINE

## 2023-09-26 ENCOUNTER — LAB SERVICES (OUTPATIENT)
Dept: LAB | Age: 88
End: 2023-09-26
Attending: FAMILY MEDICINE

## 2023-09-26 VITALS
HEIGHT: 64 IN | DIASTOLIC BLOOD PRESSURE: 79 MMHG | WEIGHT: 181.5 LBS | HEART RATE: 54 BPM | SYSTOLIC BLOOD PRESSURE: 158 MMHG | RESPIRATION RATE: 16 BRPM | TEMPERATURE: 98.2 F | BODY MASS INDEX: 30.99 KG/M2 | OXYGEN SATURATION: 98 %

## 2023-09-26 DIAGNOSIS — D50.0 IRON DEFICIENCY ANEMIA DUE TO CHRONIC BLOOD LOSS: Primary | ICD-10-CM

## 2023-09-26 DIAGNOSIS — D64.9 ANEMIA, UNSPECIFIED TYPE: ICD-10-CM

## 2023-09-26 LAB
ALBUMIN SERPL-MCNC: 3.6 G/DL (ref 3.6–5.1)
ALBUMIN/GLOB SERPL: 0.9 {RATIO} (ref 1–2.4)
ALP SERPL-CCNC: 65 UNITS/L (ref 45–117)
ALT SERPL-CCNC: 25 UNITS/L
ANION GAP SERPL CALC-SCNC: 9 MMOL/L (ref 7–19)
AST SERPL-CCNC: 19 UNITS/L
BASOPHILS # BLD: 0.1 K/MCL (ref 0–0.3)
BASOPHILS NFR BLD: 1 %
BILIRUB SERPL-MCNC: 0.5 MG/DL (ref 0.2–1)
BUN SERPL-MCNC: 19 MG/DL (ref 6–20)
BUN/CREAT SERPL: 20 (ref 7–25)
CALCIUM SERPL-MCNC: 9.7 MG/DL (ref 8.4–10.2)
CHLORIDE SERPL-SCNC: 107 MMOL/L (ref 97–110)
CO2 SERPL-SCNC: 27 MMOL/L (ref 21–32)
CREAT SERPL-MCNC: 0.93 MG/DL (ref 0.67–1.17)
DEPRECATED RDW RBC: 44.5 FL (ref 39–50)
EGFRCR SERPLBLD CKD-EPI 2021: 80 ML/MIN/{1.73_M2}
EOSINOPHIL # BLD: 0.2 K/MCL (ref 0–0.5)
EOSINOPHIL NFR BLD: 2 %
ERYTHROCYTE [DISTWIDTH] IN BLOOD: 12.9 % (ref 11–15)
FASTING DURATION TIME PATIENT: ABNORMAL H
FERRITIN SERPL-MCNC: 9 NG/ML (ref 26–388)
FOLATE SERPL-MCNC: >24 NG/ML
GLOBULIN SER-MCNC: 3.8 G/DL (ref 2–4)
GLUCOSE SERPL-MCNC: 97 MG/DL (ref 70–99)
HAPTOGLOB SERPL-MCNC: 112 MG/DL (ref 30–200)
HCT VFR BLD CALC: 37.2 % (ref 39–51)
HGB BLD-MCNC: 11.8 G/DL (ref 13–17)
IMM GRANULOCYTES # BLD AUTO: 0 K/MCL (ref 0–0.2)
IMM GRANULOCYTES # BLD: 0 %
IRON SATN MFR SERPL: 10 % (ref 15–45)
IRON SERPL-MCNC: 44 MCG/DL (ref 65–175)
LDH SERPL L TO P-CCNC: 178 UNITS/L (ref 86–234)
LYMPHOCYTES # BLD: 2.2 K/MCL (ref 1–4)
LYMPHOCYTES NFR BLD: 28 %
MCH RBC QN AUTO: 29.9 PG (ref 26–34)
MCHC RBC AUTO-ENTMCNC: 31.7 G/DL (ref 32–36.5)
MCV RBC AUTO: 94.4 FL (ref 78–100)
MONOCYTES # BLD: 0.8 K/MCL (ref 0.3–0.9)
MONOCYTES NFR BLD: 9 %
NEUTROPHILS # BLD: 4.8 K/MCL (ref 1.8–7.7)
NEUTROPHILS NFR BLD: 60 %
NRBC BLD MANUAL-RTO: 0 /100 WBC
PLATELET # BLD AUTO: 275 K/MCL (ref 140–450)
POTASSIUM SERPL-SCNC: 4.9 MMOL/L (ref 3.4–5.1)
PROT SERPL-MCNC: 7.4 G/DL (ref 6.4–8.2)
RBC # BLD: 3.94 MIL/MCL (ref 4.5–5.9)
SODIUM SERPL-SCNC: 138 MMOL/L (ref 135–145)
TIBC SERPL-MCNC: 424 MCG/DL (ref 250–450)
VIT B12 SERPL-MCNC: 592 PG/ML (ref 211–911)
WBC # BLD: 8.1 K/MCL (ref 4.2–11)

## 2023-09-26 PROCEDURE — 99203 OFFICE O/P NEW LOW 30 MIN: CPT | Performed by: STUDENT IN AN ORGANIZED HEALTH CARE EDUCATION/TRAINING PROGRAM

## 2023-09-26 PROCEDURE — 83010 ASSAY OF HAPTOGLOBIN QUANT: CPT

## 2023-09-26 PROCEDURE — 36415 COLL VENOUS BLD VENIPUNCTURE: CPT

## 2023-09-26 PROCEDURE — 99202 OFFICE O/P NEW SF 15 MIN: CPT

## 2023-09-26 PROCEDURE — 83921 ORGANIC ACID SINGLE QUANT: CPT

## 2023-09-26 PROCEDURE — 83540 ASSAY OF IRON: CPT

## 2023-09-26 PROCEDURE — 80053 COMPREHEN METABOLIC PANEL: CPT

## 2023-09-26 PROCEDURE — 82728 ASSAY OF FERRITIN: CPT

## 2023-09-26 PROCEDURE — 85025 COMPLETE CBC W/AUTO DIFF WBC: CPT

## 2023-09-26 PROCEDURE — 83615 LACTATE (LD) (LDH) ENZYME: CPT

## 2023-09-26 PROCEDURE — 83090 ASSAY OF HOMOCYSTEINE: CPT

## 2023-09-26 PROCEDURE — 82607 VITAMIN B-12: CPT

## 2023-09-26 ASSESSMENT — PATIENT HEALTH QUESTIONNAIRE - PHQ9
SUM OF ALL RESPONSES TO PHQ9 QUESTIONS 1 AND 2: 0
1. LITTLE INTEREST OR PLEASURE IN DOING THINGS: NOT AT ALL
SUM OF ALL RESPONSES TO PHQ9 QUESTIONS 1 AND 2: 0
2. FEELING DOWN, DEPRESSED OR HOPELESS: NOT AT ALL
CLINICAL INTERPRETATION OF PHQ2 SCORE: NO FURTHER SCREENING NEEDED

## 2023-09-26 ASSESSMENT — PAIN SCALES - GENERAL: PAINLEVEL: 0

## 2023-09-27 LAB — HCYS SERPL-SCNC: 10.1 MCMOL/L

## 2023-09-28 ENCOUNTER — TELEPHONE (OUTPATIENT)
Dept: HEMATOLOGY/ONCOLOGY | Age: 88
End: 2023-09-28

## 2023-09-28 LAB — METHYLMALONATE SERPL-SCNC: 0.23 UMOL/L (ref 0–0.4)

## 2023-09-28 RX ORDER — FERROUS SULFATE 325(65) MG
325 TABLET ORAL
Qty: 30 TABLET | Refills: 3 | Status: SHIPPED | OUTPATIENT
Start: 2023-09-28

## 2023-10-03 ENCOUNTER — TELEPHONE (OUTPATIENT)
Dept: HEMATOLOGY/ONCOLOGY | Age: 88
End: 2023-10-03

## 2023-10-31 ENCOUNTER — OFFICE VISIT (OUTPATIENT)
Dept: SURGERY | Facility: CLINIC | Age: 88
End: 2023-10-31

## 2023-10-31 DIAGNOSIS — Z85.51 HISTORY OF BLADDER CANCER: Primary | ICD-10-CM

## 2023-10-31 DIAGNOSIS — N13.8 BPH WITH OBSTRUCTION/LOWER URINARY TRACT SYMPTOMS: ICD-10-CM

## 2023-10-31 DIAGNOSIS — N40.1 BPH WITH OBSTRUCTION/LOWER URINARY TRACT SYMPTOMS: ICD-10-CM

## 2023-10-31 LAB
APPEARANCE: CLEAR
BILIRUBIN: NEGATIVE
GLUCOSE (URINE DIPSTICK): NEGATIVE MG/DL
KETONES (URINE DIPSTICK): NEGATIVE MG/DL
LEUKOCYTES: NEGATIVE
MULTISTIX LOT#: ABNORMAL NUMERIC
NITRITE, URINE: NEGATIVE
PH, URINE: 6 (ref 4.5–8)
PROTEIN (URINE DIPSTICK): NEGATIVE MG/DL
SPECIFIC GRAVITY: 1.01 (ref 1–1.03)
URINE-COLOR: YELLOW
UROBILINOGEN,SEMI-QN: 0.2 MG/DL (ref 0–1.9)

## 2023-10-31 PROCEDURE — 81003 URINALYSIS AUTO W/O SCOPE: CPT | Performed by: SURGERY

## 2023-10-31 PROCEDURE — 99214 OFFICE O/P EST MOD 30 MIN: CPT | Performed by: SURGERY

## 2023-10-31 RX ORDER — FERROUS SULFATE 325(65) MG
325 TABLET ORAL
COMMUNITY
Start: 2023-09-28

## 2023-10-31 RX ORDER — TAMSULOSIN HYDROCHLORIDE 0.4 MG/1
0.4 CAPSULE ORAL DAILY
Qty: 90 CAPSULE | Refills: 5 | Status: SHIPPED | OUTPATIENT
Start: 2023-10-31

## 2023-10-31 RX ORDER — ISOSORBIDE MONONITRATE 30 MG/1
30 TABLET, EXTENDED RELEASE ORAL DAILY
COMMUNITY

## 2023-10-31 NOTE — PROGRESS NOTES
Urology Clinic Note    Primary Care Provider:  Pratibha Brewer     Chief Complaint:   OAB and bladder cancer follow-up    HPI:   Santos Ramirez is a 82year old male with history of AAA, OA, CAD status post CABG, DJD, hyperlipidemia, hypertension, GERD, DELANO, BPH status post prior TURP on tamsulosin, nonmuscle invasive urothelial bladder cancer diagnosed in April 2016 previously followed by Dr. Herminio Martell. His most recent surveillance cystoscopy on 8/24/2021 was normal.  Urine cytology 6/2/2022 was normal.    He denies any recent gross hematuria. He endorses stable urinary urgency with rare urge incontinence. He does have occasional weak urinary stream particularly at night and is on tamsulosin. He has nocturia 3 times per night. AUA symptom score is 14/terrible with LUTS.     Post-void residual bladder scan: 133 mL    Urinalysis: Trace blood, otherwise negative    PSA:  Lab Results   Component Value Date    PSA 0.41 08/29/2020        History:     Past Medical History:   Diagnosis Date    AAA (abdominal aortic aneurysm) 5/19/2015    Arthritis     Atherosclerosis of native artery of extremity ([facility] 5/19/2015    CAD in native artery 5/19/2015    Carotid artery stenosis, unspecified laterality 5/19/2015    Closed Colles' fracture of right radius with malunion 12/15/2014    Closed fracture of dorsal (thoracic) vertebra without mention of spinal cord injury 6/20/2013    Log Date: 10/30/2012     DJD (degenerative joint disease), ankle and foot 6/27/2014    Enthesopathy of hip region 8/1/2014    Heart disease     History of chest pain     History of coronary artery stent placement 5/19/2015    Hypercholesterolemia     Hyperlipidemia     Hypertension     Hypertension, benign 5/19/2015    Pain in thoracic spine 6/20/2013    Log Date: 11/27/2012     Pulmonary embolism ([facility] 07/2018    Reflux     S/P CABG (coronary artery bypass graft) 5/19/2015    Stress fracture of the metatarsals 6/27/2014    Ulcer     Unspecified sleep apnea PSG 8-17-15    AHI [address] 84%    Wrist pain, right 12/15/2014       Past Surgical History:   Procedure Laterality Date    ANGIOPLASTY (CORONARY)      x3    BYPASS SURGERY  1997    Triple     CATH CARTOTID STENT      x4    FLUOR GID & Floy Mantis NDL/CATH SPI DX/THER NJX  4/28/2014    Procedure: LUMBAR / TRANSFORAMINAL EPIDURAL STEROID INJECTION;  Surgeon: Nelly Wong DO;  Location: [address] [place] [address] Right 9/9/2014    Procedure: SACROILIAC JOINT INJECTION;  Surgeon: Nelly Wong DO;  Location: [address] C/T 1 LEV Bilateral 7/15/2014    Procedure: FACET INJECTION UNDER FLUOROSCOPY;  Surgeon: Nelly Wong DO;  Location: [address] C/T 1 LEV Bilateral 7/15/2014    Procedure: FACET INJECTION UNDER FLUOROSCOPY;  Surgeon: Nelly Wong DO;  Location: [address] C/T 2 LEV Bilateral 7/15/2014    Procedure: FACET INJECTION UNDER FLUOROSCOPY;  Surgeon: Nelly Wong DO;  Location: [address] C/T 2 LEV Bilateral 7/15/2014    Procedure: FACET INJECTION UNDER FLUOROSCOPY;  Surgeon: Nelly Wong DO;  Location: [address] [place] [address]    INJECTION, W/WO CONTRAST, DX/THERAPEUTIC SUBSTANCE, EPIDURAL/SUBARACHNOID; LUMBAR/SACRAL  4/28/2014    Procedure: LUMBAR / TRANSFORAMINAL EPIDURAL STEROID INJECTION;  Surgeon: Nelly Wong DO;  Location: [address] [place] [address]    OTHER SURGICAL HISTORY      right wrist    OTHER SURGICAL HISTORY      right elbow    OTHER SURGICAL HISTORY      appy    OTHER SURGICAL HISTORY      tonsils    OTHER SURGICAL HISTORY      8 angio grams    OTHER SURGICAL HISTORY  12/30/16    Cystoscopy- Dr. Yadav [place]  09/08/2017, 3/30/18    Cysto w/ Dr. Carla Diaz  4/28/2014    Procedure: Steve Alegria NERVE STIMULATOR INSERTION;  Surgeon: Roxanne Crowe DO;  Location: [address] [place] [address]    PATIENT DOCUMENTED NOT TO HAVE EXPERIENCED ANY OF THE FOLLOWING EVENTS Bilateral 7/15/2014    Procedure: FACET INJECTION UNDER FLUOROSCOPY;  Surgeon: Roxanne Crowe DO;  Location: [address] [place] [address]    PATIENT DOCUMENTED NOT TO HAVE EXPERIENCED ANY OF THE FOLLOWING EVENTS Right 2014    Procedure: SACROILIAC JOINT INJECTION;  Surgeon: Roxanne Crowe DO;  Location: [address] [place] [address]    PATIENT [place] INFECTION PROPHYLAXIS.  2014    Procedure: LUMBAR / TRANSFORAMINAL EPIDURAL STEROID INJECTION;  Surgeon: Roxanne Crowe DO;  Location: [address] [place] [address]    PATIENT [place] INFECTION PROPHYLAXIS. Bilateral 7/15/2014    Procedure: FACET INJECTION UNDER FLUOROSCOPY;  Surgeon: Roxanne Crowe DO;  Location: [address] [place] [address]    PATIENT [facility] ANTIBIOTIC SURGICAL SITE INFECTION PROPHYLAXIS. Right 2014    Procedure: SACROILIAC JOINT INJECTION;  Surgeon: Roxanne Crowe DO;  Location: [address] [place] [address]         Family History   Problem Relation Age of Onset    Heart Disorder Father     Other (Other) Father     Other (Other) Mother        Social History     Socioeconomic History    Marital status:    Tobacco Use    Smoking status: Former     Packs/day: 1.50     Years: 28.00     Additional pack years: 0.00     Total pack years: 42.00     Types: Cigarettes     Quit date: 1988     Years since quittin.3    Smokeless tobacco: Never   Vaping Use    Vaping Use: Never used   Substance and Sexual Activity    Alcohol use:  Yes     Alcohol/week: 0.0 standard drinks of alcohol    Drug use: No   Other Topics Concern    Caffeine Concern Yes     Comment: 2 cups a day    Exercise Yes     Comment: Stretches Medications (Active prior to today's visit):  Current Outpatient Medications   Medication Sig Dispense Refill    Ferrous Sulfate 325 (65 Fe) MG Oral Tab Take 1 tablet (325 mg total) by mouth daily with breakfast.      isosorbide mononitrate ER 30 MG Oral Tablet 24 Hr Take 1 tablet (30 mg total) by mouth daily. TAMSULOSIN 0.4 MG Oral Cap TAKE 1 CAPSULE (0.4 MG TOTAL) BY MOUTH DAILY. 30 MIN AFTER SAME MEAL 90 capsule 2    CLOPIDOGREL 75 MG Oral Tab TAKE 1 TABLET EVERY DAY 90 tablet 3    METOPROLOL SUCCINATE 25 MG Oral Tablet 24 Hr TAKE 1 TABLET EVERY DAY 90 tablet 3    AMLODIPINE 5 MG Oral Tab TAKE 1 TABLET EVERY DAY 90 tablet 3    aspirin 81 MG Oral Tab Take 81 mg by mouth daily. HYDROcodone-acetaminophen (NORCO)  MG Oral Tab Take 1 tablet by mouth every 8 (eight) hours as needed for Pain. 60 tablet 0    atorvastatin 80 MG Oral Tab Take 0.5 tablets (40 mg total) by mouth daily. Calcium Carbonate-Vitamin D (CALCIUM + D OR) Take 1,500 mg by mouth 2 (two) times daily. Multiple Vitamins-Minerals (MULTIVITAMIN OR) Take  by mouth. Allergies:    Naproxen                RASH    Review of Systems:   A comprehensive 10-point review of systems was completed. Pertinent positives and negatives are noted in the the HPI. Physical Exam:   CONSTITUTIONAL: Well developed, well nourished, in no acute distress  NEUROLOGIC: Alert and oriented  HEAD: Normocephalic, atraumatic  EYES: Sclera non-icteric  ENT: Hearing intact, moist mucous membranes  NECK: No obvious goiter or masses  RESPIRATORY: Normal respiratory effort  SKIN: No evident rashes  ABDOMEN: Soft, non-tender, non-distended    Assessment & Plan:   Chica Strange is a 82year old male with history of AAA, OA, CAD status post CABG, DJD, hyperlipidemia, hypertension, GERD, DELANO, BPH status post prior TURP on tamsulosin, nonmuscle invasive urothelial bladder cancer diagnosed in April 2016 previously followed by Dr. Anthony Arroyo.   His most recent surveillance cystoscopy on 8/24/2021 was normal.  Urine cytology 6/2/2022 was normal.    He denies any recent gross hematuria. He endorses stable urinary urgency with rare urge incontinence. He does have occasional weak urinary stream particularly at night and is on tamsulosin. He has nocturia 3 times per night. Regarding his bladder cancer history, I do not have the pathology to review so I do not know if he is intermediate or high risk bladder cancer. I explained to him that if he were intermediate risk we would perform cystoscopies for 5 years, and if he were high risk we would perform that for 10 years. He thinks that he had a small noninvasive bladder tumor and that he was intermediate risk so he would like to avoid any further cystoscopies for now. Regarding his voiding symptoms he is relatively satisfied at this time. I discussed anticholinergic medication as an option but he declines. I discussed increasing the dose of his tamsulosin and he elects to proceed with this to see if this helps his urine stream.    -Increase tamsulosin dose up to 0.8 mg daily (we will try this for a few weeks and let us know if it helps so that we can update the prescription)  -Declines any further cystoscopies at this time  -Follow-up urine cytology  -Return to clinic in 1 year or sooner as needed    In total, 30 minutes were spent on this patient encounter (including chart review, patient history, physical, and counseling, documentation, and communication). Beth Hernandez.  Lonnie Patten MD  Staff Urologist  Nickie Magallanes  Office: [phone]

## 2023-11-01 LAB — NON GYNE INTERPRETATION: NEGATIVE

## 2023-11-01 NOTE — PROGRESS NOTES
Jone Yu,    I have reviewed your urine test results. Tests show no significant abnormalities (no signs of microscopic cancer cells in the urine). No changes to the plan as we had previously discussed. Please let me know if you have any questions.     Thanks,  Bayron Dao MD

## 2023-12-15 ENCOUNTER — EXTERNAL RECORD (OUTPATIENT)
Dept: HEALTH INFORMATION MANAGEMENT | Facility: OTHER | Age: 88
End: 2023-12-15

## 2023-12-19 ENCOUNTER — OFFICE VISIT (OUTPATIENT)
Dept: HEMATOLOGY/ONCOLOGY | Age: 88
End: 2023-12-19
Attending: FAMILY MEDICINE

## 2023-12-19 ENCOUNTER — TELEPHONE (OUTPATIENT)
Dept: HEMATOLOGY/ONCOLOGY | Age: 88
End: 2023-12-19

## 2023-12-19 ENCOUNTER — LAB SERVICES (OUTPATIENT)
Dept: LAB | Age: 88
End: 2023-12-19
Attending: FAMILY MEDICINE

## 2023-12-19 VITALS
WEIGHT: 183 LBS | TEMPERATURE: 98 F | BODY MASS INDEX: 31.24 KG/M2 | RESPIRATION RATE: 15 BRPM | HEIGHT: 64 IN | SYSTOLIC BLOOD PRESSURE: 142 MMHG | HEART RATE: 51 BPM | DIASTOLIC BLOOD PRESSURE: 83 MMHG | OXYGEN SATURATION: 98 %

## 2023-12-19 DIAGNOSIS — D50.0 IRON DEFICIENCY ANEMIA DUE TO CHRONIC BLOOD LOSS: Primary | ICD-10-CM

## 2023-12-19 DIAGNOSIS — D50.0 IRON DEFICIENCY ANEMIA DUE TO CHRONIC BLOOD LOSS: ICD-10-CM

## 2023-12-19 LAB
BASOPHILS # BLD: 0.1 K/MCL (ref 0–0.3)
BASOPHILS NFR BLD: 1 %
DEPRECATED RDW RBC: 51.7 FL (ref 39–50)
EOSINOPHIL # BLD: 0.2 K/MCL (ref 0–0.5)
EOSINOPHIL NFR BLD: 3 %
ERYTHROCYTE [DISTWIDTH] IN BLOOD: 15.6 % (ref 11–15)
FERRITIN SERPL-MCNC: 35 NG/ML (ref 26–388)
HCT VFR BLD CALC: 40.1 % (ref 39–51)
HGB BLD-MCNC: 13.3 G/DL (ref 13–17)
IMM GRANULOCYTES # BLD AUTO: 0 K/MCL (ref 0–0.2)
IMM GRANULOCYTES # BLD: 0 %
IRON SATN MFR SERPL: 29 % (ref 15–45)
IRON SERPL-MCNC: 100 MCG/DL (ref 65–175)
LYMPHOCYTES # BLD: 1.9 K/MCL (ref 1–4)
LYMPHOCYTES NFR BLD: 28 %
MCH RBC QN AUTO: 30 PG (ref 26–34)
MCHC RBC AUTO-ENTMCNC: 33.2 G/DL (ref 32–36.5)
MCV RBC AUTO: 90.3 FL (ref 78–100)
MONOCYTES # BLD: 0.7 K/MCL (ref 0.3–0.9)
MONOCYTES NFR BLD: 10 %
NEUTROPHILS # BLD: 3.9 K/MCL (ref 1.8–7.7)
NEUTROPHILS NFR BLD: 58 %
NRBC BLD MANUAL-RTO: 0 /100 WBC
PLATELET # BLD AUTO: 227 K/MCL (ref 140–450)
RBC # BLD: 4.44 MIL/MCL (ref 4.5–5.9)
TIBC SERPL-MCNC: 350 MCG/DL (ref 250–450)
WBC # BLD: 6.7 K/MCL (ref 4.2–11)

## 2023-12-19 PROCEDURE — 82728 ASSAY OF FERRITIN: CPT

## 2023-12-19 PROCEDURE — 99211 OFF/OP EST MAY X REQ PHY/QHP: CPT

## 2023-12-19 PROCEDURE — 85025 COMPLETE CBC W/AUTO DIFF WBC: CPT

## 2023-12-19 PROCEDURE — 36415 COLL VENOUS BLD VENIPUNCTURE: CPT

## 2023-12-19 PROCEDURE — 99213 OFFICE O/P EST LOW 20 MIN: CPT | Performed by: STUDENT IN AN ORGANIZED HEALTH CARE EDUCATION/TRAINING PROGRAM

## 2023-12-19 PROCEDURE — 83540 ASSAY OF IRON: CPT

## 2023-12-19 ASSESSMENT — PATIENT HEALTH QUESTIONNAIRE - PHQ9
CLINICAL INTERPRETATION OF PHQ2 SCORE: NO FURTHER SCREENING NEEDED
SUM OF ALL RESPONSES TO PHQ9 QUESTIONS 1 AND 2: 0
2. FEELING DOWN, DEPRESSED OR HOPELESS: NOT AT ALL
CLINICAL INTERPRETATION OF PHQ2 SCORE: NO FURTHER SCREENING NEEDED
2. FEELING DOWN, DEPRESSED OR HOPELESS: NOT AT ALL
SUM OF ALL RESPONSES TO PHQ9 QUESTIONS 1 AND 2: 0
1. LITTLE INTEREST OR PLEASURE IN DOING THINGS: NOT AT ALL
1. LITTLE INTEREST OR PLEASURE IN DOING THINGS: NOT AT ALL
SUM OF ALL RESPONSES TO PHQ9 QUESTIONS 1 AND 2: 0
SUM OF ALL RESPONSES TO PHQ9 QUESTIONS 1 AND 2: 0

## 2023-12-19 ASSESSMENT — PAIN SCALES - GENERAL: PAINLEVEL: 0

## 2024-03-13 ENCOUNTER — APPOINTMENT (OUTPATIENT)
Dept: FAMILY MEDICINE | Age: 89
End: 2024-03-13

## 2024-03-18 ENCOUNTER — TELEPHONE (OUTPATIENT)
Dept: FAMILY MEDICINE | Age: 89
End: 2024-03-18

## 2024-03-19 ENCOUNTER — OFFICE VISIT (OUTPATIENT)
Dept: FAMILY MEDICINE | Age: 89
End: 2024-03-19

## 2024-03-19 VITALS
WEIGHT: 182 LBS | BODY MASS INDEX: 31.07 KG/M2 | OXYGEN SATURATION: 96 % | HEART RATE: 57 BPM | SYSTOLIC BLOOD PRESSURE: 134 MMHG | DIASTOLIC BLOOD PRESSURE: 82 MMHG | HEIGHT: 64 IN | TEMPERATURE: 97.9 F | RESPIRATION RATE: 16 BRPM

## 2024-03-19 DIAGNOSIS — E29.1 TESTICULAR HYPOFUNCTION: ICD-10-CM

## 2024-03-19 DIAGNOSIS — I10 BENIGN ESSENTIAL HYPERTENSION: Primary | ICD-10-CM

## 2024-03-19 DIAGNOSIS — I71.40 ABDOMINAL AORTIC ANEURYSM (AAA) WITHOUT RUPTURE, UNSPECIFIED PART (CMD): ICD-10-CM

## 2024-03-19 DIAGNOSIS — M40.00 ACQUIRED POSTURAL KYPHOSIS: ICD-10-CM

## 2024-03-19 DIAGNOSIS — M48.061 SPINAL STENOSIS OF LUMBAR REGION WITHOUT NEUROGENIC CLAUDICATION: ICD-10-CM

## 2024-03-19 DIAGNOSIS — E78.2 HYPERLIPEMIA, MIXED: ICD-10-CM

## 2024-03-19 DIAGNOSIS — I48.11 LONGSTANDING PERSISTENT ATRIAL FIBRILLATION (CMD): ICD-10-CM

## 2024-03-19 DIAGNOSIS — Z86.79 ATRIAL FIBRILLATION, CURRENTLY IN SINUS RHYTHM: ICD-10-CM

## 2024-03-19 DIAGNOSIS — E55.9 VITAMIN D DEFICIENCY: ICD-10-CM

## 2024-03-19 DIAGNOSIS — M19.90 ARTHRITIS: ICD-10-CM

## 2024-03-19 PROBLEM — D50.0 IRON DEFICIENCY ANEMIA DUE TO CHRONIC BLOOD LOSS: Status: RESOLVED | Noted: 2018-07-07 | Resolved: 2024-03-19

## 2024-03-19 PROBLEM — Z00.00 ENCOUNTER FOR GENERAL ADULT MEDICAL EXAMINATION W/O ABNORMAL FINDINGS: Status: ACTIVE | Noted: 2024-03-19

## 2024-03-19 RX ORDER — METOPROLOL SUCCINATE 25 MG/1
25 TABLET, EXTENDED RELEASE ORAL DAILY
COMMUNITY
Start: 2024-03-18

## 2024-03-19 ASSESSMENT — ENCOUNTER SYMPTOMS
WHEEZING: 0
FEVER: 0
HEADACHES: 0
DIZZINESS: 0
NUMBNESS: 0
COUGH: 0
FATIGUE: 0
CHILLS: 0
WEAKNESS: 0
LIGHT-HEADEDNESS: 0
SHORTNESS OF BREATH: 0
DIARRHEA: 0
NAUSEA: 0
ABDOMINAL PAIN: 0
VOMITING: 0

## 2024-03-19 ASSESSMENT — PATIENT HEALTH QUESTIONNAIRE - PHQ9
CLINICAL INTERPRETATION OF PHQ2 SCORE: NO FURTHER SCREENING NEEDED
1. LITTLE INTEREST OR PLEASURE IN DOING THINGS: NOT AT ALL
SUM OF ALL RESPONSES TO PHQ9 QUESTIONS 1 AND 2: 0
2. FEELING DOWN, DEPRESSED OR HOPELESS: NOT AT ALL
SUM OF ALL RESPONSES TO PHQ9 QUESTIONS 1 AND 2: 0

## 2024-03-19 ASSESSMENT — PAIN SCALES - GENERAL: PAINLEVEL: 0

## 2024-04-08 ENCOUNTER — TELEPHONE (OUTPATIENT)
Dept: FAMILY MEDICINE | Age: 89
End: 2024-04-08

## 2024-04-09 ENCOUNTER — APPOINTMENT (OUTPATIENT)
Dept: FAMILY MEDICINE | Age: 89
End: 2024-04-09

## 2024-04-09 VITALS
BODY MASS INDEX: 31.07 KG/M2 | HEIGHT: 64 IN | WEIGHT: 182 LBS | DIASTOLIC BLOOD PRESSURE: 72 MMHG | RESPIRATION RATE: 16 BRPM | SYSTOLIC BLOOD PRESSURE: 132 MMHG | OXYGEN SATURATION: 95 % | HEART RATE: 68 BPM | TEMPERATURE: 97.8 F

## 2024-04-09 DIAGNOSIS — G47.69 NOCTURNAL MYOCLONUS: Primary | ICD-10-CM

## 2024-04-09 PROBLEM — R07.9 CHEST PAIN: Status: RESOLVED | Noted: 2023-08-16 | Resolved: 2024-04-09

## 2024-04-09 PROCEDURE — 99213 OFFICE O/P EST LOW 20 MIN: CPT | Performed by: FAMILY MEDICINE

## 2024-04-09 RX ORDER — GABAPENTIN 300 MG/1
300 CAPSULE ORAL AT BEDTIME
Qty: 90 CAPSULE | Refills: 3 | Status: SHIPPED | OUTPATIENT
Start: 2024-04-09

## 2024-04-09 RX ORDER — GABAPENTIN 100 MG/1
100 CAPSULE ORAL AT BEDTIME
Qty: 90 CAPSULE | Refills: 3 | Status: SHIPPED | OUTPATIENT
Start: 2024-04-09

## 2024-04-09 ASSESSMENT — PAIN SCALES - GENERAL: PAINLEVEL: 0

## 2024-04-09 ASSESSMENT — PATIENT HEALTH QUESTIONNAIRE - PHQ9
SUM OF ALL RESPONSES TO PHQ9 QUESTIONS 1 AND 2: 0
SUM OF ALL RESPONSES TO PHQ9 QUESTIONS 1 AND 2: 0
1. LITTLE INTEREST OR PLEASURE IN DOING THINGS: NOT AT ALL
CLINICAL INTERPRETATION OF PHQ2 SCORE: NO FURTHER SCREENING NEEDED
2. FEELING DOWN, DEPRESSED OR HOPELESS: NOT AT ALL

## 2024-07-16 ENCOUNTER — WALK IN (OUTPATIENT)
Dept: URGENT CARE | Age: 89
End: 2024-07-16
Attending: EMERGENCY MEDICINE

## 2024-07-16 VITALS
OXYGEN SATURATION: 96 % | BODY MASS INDEX: 30.9 KG/M2 | DIASTOLIC BLOOD PRESSURE: 80 MMHG | TEMPERATURE: 97.9 F | SYSTOLIC BLOOD PRESSURE: 152 MMHG | RESPIRATION RATE: 16 BRPM | HEART RATE: 68 BPM | WEIGHT: 180 LBS

## 2024-07-16 DIAGNOSIS — S39.012A STRAIN OF LUMBAR REGION, INITIAL ENCOUNTER: Primary | ICD-10-CM

## 2024-07-16 PROCEDURE — 99212 OFFICE O/P EST SF 10 MIN: CPT

## 2024-07-16 RX ORDER — CYCLOBENZAPRINE HCL 5 MG
5 TABLET ORAL 3 TIMES DAILY PRN
Qty: 15 TABLET | Refills: 0 | Status: SHIPPED | OUTPATIENT
Start: 2024-07-16

## 2024-07-16 RX ORDER — METHYLPREDNISOLONE 4 MG/1
4 TABLET ORAL SEE ADMIN INSTRUCTIONS
Qty: 21 TABLET | Refills: 0 | Status: SHIPPED | OUTPATIENT
Start: 2024-07-16

## 2024-07-16 ASSESSMENT — ENCOUNTER SYMPTOMS: BACK PAIN: 1

## 2024-08-12 ENCOUNTER — TELEPHONE (OUTPATIENT)
Dept: NEUROSURGERY | Age: 89
End: 2024-08-12

## 2024-08-12 DIAGNOSIS — M40.00 ACQUIRED POSTURAL KYPHOSIS: Primary | ICD-10-CM

## 2024-09-24 ENCOUNTER — APPOINTMENT (OUTPATIENT)
Dept: FAMILY MEDICINE | Age: 89
End: 2024-09-24

## 2024-09-25 ENCOUNTER — TELEPHONE (OUTPATIENT)
Dept: FAMILY MEDICINE | Age: 89
End: 2024-09-25

## 2024-10-08 ENCOUNTER — TELEPHONE (OUTPATIENT)
Dept: FAMILY MEDICINE | Age: 89
End: 2024-10-08

## 2024-10-09 ENCOUNTER — APPOINTMENT (OUTPATIENT)
Dept: FAMILY MEDICINE | Age: 89
End: 2024-10-09

## 2024-10-09 VITALS
SYSTOLIC BLOOD PRESSURE: 134 MMHG | BODY MASS INDEX: 30.39 KG/M2 | OXYGEN SATURATION: 96 % | WEIGHT: 178 LBS | RESPIRATION RATE: 16 BRPM | TEMPERATURE: 98.1 F | HEIGHT: 64 IN | DIASTOLIC BLOOD PRESSURE: 76 MMHG | HEART RATE: 67 BPM

## 2024-10-09 DIAGNOSIS — M48.061 SPINAL STENOSIS OF LUMBAR REGION WITHOUT NEUROGENIC CLAUDICATION: ICD-10-CM

## 2024-10-09 DIAGNOSIS — I10 BENIGN ESSENTIAL HYPERTENSION: ICD-10-CM

## 2024-10-09 DIAGNOSIS — E55.9 VITAMIN D DEFICIENCY: ICD-10-CM

## 2024-10-09 DIAGNOSIS — I70.209 ATHEROSCLEROSIS OF NATIVE ARTERY OF EXTREMITY, UNSPECIFIED EXTREMITY, WITH UNSPECIFIED PRESENCE OF CLINICAL MANIFESTATION (CMD): ICD-10-CM

## 2024-10-09 DIAGNOSIS — E78.2 HYPERLIPEMIA, MIXED: ICD-10-CM

## 2024-10-09 DIAGNOSIS — Z00.00 MEDICARE ANNUAL WELLNESS VISIT, SUBSEQUENT: Primary | ICD-10-CM

## 2024-10-09 DIAGNOSIS — Z74.09 IMPAIRED FUNCTIONAL MOBILITY, BALANCE, AND ENDURANCE: ICD-10-CM

## 2024-10-09 DIAGNOSIS — I71.40 ABDOMINAL AORTIC ANEURYSM (AAA) WITHOUT RUPTURE, UNSPECIFIED PART (CMD): ICD-10-CM

## 2024-10-09 DIAGNOSIS — M19.90 ARTHRITIS: ICD-10-CM

## 2024-10-09 PROCEDURE — 99214 OFFICE O/P EST MOD 30 MIN: CPT | Performed by: FAMILY MEDICINE

## 2024-10-09 PROCEDURE — G0439 PPPS, SUBSEQ VISIT: HCPCS | Performed by: FAMILY MEDICINE

## 2024-10-09 RX ORDER — LOSARTAN POTASSIUM 25 MG/1
1 TABLET ORAL DAILY
COMMUNITY
Start: 2024-08-09

## 2024-10-09 ASSESSMENT — ACTIVITIES OF DAILY LIVING (ADL)
USING TELEPHONE: INDEPENDENT
NEEDS ASSISTANCE WITH FOOD: INDEPENDENT
GROCERY SHOPPING: INDEPENDENT
ADL_BEFORE_ADMISSION: INDEPENDENT
ADL_SHORT_OF_BREATH: NO
EATING: INDEPENDENT
ADL_SCORE: 12
SENSORY_SUPPORT_DEVICES: HEARING AIDS
PREPARING MEALS: INDEPENDENT
RECENT_DECLINE_ADL: NO
PILL BOX USED: YES
RECENT_DECLINE_ADL: NO
MANAGING FINANCES: INDEPENDENT
STIL DRIVING: YES
USING TRANSPORTATION: INDEPENDENT
NEEDS_ASSIST: NO
ADL_SCORE: 12
ADL_SHORT_OF_BREATH: NO
TAKING MEDICATION: INDEPENDENT
ADL_BEFORE_ADMISSION: INDEPENDENT

## 2024-10-09 ASSESSMENT — ENCOUNTER SYMPTOMS
CONSTITUTIONAL NEGATIVE: 1
RESPIRATORY NEGATIVE: 1
GASTROINTESTINAL NEGATIVE: 1
HEMATOLOGIC/LYMPHATIC NEGATIVE: 1
EYES NEGATIVE: 1
ENDOCRINE NEGATIVE: 1
ALLERGIC/IMMUNOLOGIC NEGATIVE: 1
PSYCHIATRIC NEGATIVE: 1

## 2024-10-09 ASSESSMENT — MINI COG
TOTAL SCORE: 5
PATIENT WAS GIVEN REPEAT BACK WORDS FROM VERSION: 1 - BANANA SUNRISE CHAIR
PATIENT ABLE TO REPEAT THE 3 WORDS GIVEN PREVIOUSLY?: WAS ABLE TO REPEAT BACK 3 WORDS CORRECTLY
PATIENT ABLE TO FILL IN THE CLOCK FACE WITH 10 MINUTES PAST 11 O'CLOCK?: YES, CLOCK IS CORRECT

## 2024-10-09 ASSESSMENT — PATIENT HEALTH QUESTIONNAIRE - PHQ9
CLINICAL INTERPRETATION OF PHQ2 SCORE: NO FURTHER SCREENING NEEDED
SUM OF ALL RESPONSES TO PHQ9 QUESTIONS 1 AND 2: 0
1. LITTLE INTEREST OR PLEASURE IN DOING THINGS: NOT AT ALL
2. FEELING DOWN, DEPRESSED OR HOPELESS: NOT AT ALL
SUM OF ALL RESPONSES TO PHQ9 QUESTIONS 1 AND 2: 0

## 2024-10-09 ASSESSMENT — PAIN SCALES - GENERAL: PAINLEVEL: 5

## 2024-10-25 NOTE — PROGRESS NOTES
Rooming Clinician:     Oswaldo Torres is a 82year old male. Patient presents with:  Procedure: patient presents today for Cystoscopy        HPI:     He has a remote history of bladder cancer from 2016. He has not had any recurrences.   Yearly cystos Colles' fracture of right radius with malunion 12/15/2014   • Closed fracture of dorsal (thoracic) vertebra without mention of spinal cord injury 6/20/2013    Log Date: 10/30/2012    • DJD (degenerative joint disease), ankle and foot 6/27/2014   • Fifi Herman Hall DO;  Location: [address] [place] [address]   • INJECTION, W/WO CONTRAST, DX/THERAPEUTIC SUBSTANCE, EPIDURAL/SUBARACHNOID; LUMBAR/SACRAL  4/28/2014    Procedure: LUMBAR / TRANSFORAMINAL EPIDURAL STEROID INJECTION;  Surgeon: Speedy Avery DO;  Loc REPAIR ROTATOR CUFF,ACUTE        Social History:  Social History    Tobacco Use      Smoking status: Former Smoker        Packs/day: 1.50        Years: 28.00        Pack years: 43        Quit date: 1988        Years since quittin.1      Smokeless offices were normal on a normal triangle. There were no stones or tumors seen. The patient tolerated the procedure well. He was given a postoperative prophylactic dose of antibiotic and the usual postoperative instructions.          BACK: no CVA tenderness Calm

## 2024-11-05 ENCOUNTER — OFFICE VISIT (OUTPATIENT)
Dept: SURGERY | Facility: CLINIC | Age: 89
End: 2024-11-05

## 2024-11-05 DIAGNOSIS — N13.8 BPH WITH OBSTRUCTION/LOWER URINARY TRACT SYMPTOMS: ICD-10-CM

## 2024-11-05 DIAGNOSIS — Z85.51 HISTORY OF BLADDER CANCER: ICD-10-CM

## 2024-11-05 DIAGNOSIS — N40.1 BPH WITH OBSTRUCTION/LOWER URINARY TRACT SYMPTOMS: ICD-10-CM

## 2024-11-05 DIAGNOSIS — C67.8 CANCER OF OVERLAPPING SITES OF BLADDER (HCC): Primary | ICD-10-CM

## 2024-11-05 PROCEDURE — 99214 OFFICE O/P EST MOD 30 MIN: CPT | Performed by: SURGERY

## 2024-11-05 PROCEDURE — 81003 URINALYSIS AUTO W/O SCOPE: CPT | Performed by: SURGERY

## 2024-11-05 RX ORDER — TROSPIUM CHLORIDE ER 60 MG/1
60 CAPSULE ORAL DAILY
Qty: 90 CAPSULE | Refills: 5 | Status: SHIPPED | OUTPATIENT
Start: 2024-11-05 | End: 2024-11-08

## 2024-11-05 RX ORDER — TAMSULOSIN HYDROCHLORIDE 0.4 MG/1
0.4 CAPSULE ORAL DAILY
Qty: 90 CAPSULE | Refills: 5 | Status: SHIPPED | OUTPATIENT
Start: 2024-11-05

## 2024-11-05 NOTE — PROGRESS NOTES
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 Mora.  His most recent surveillance cystoscopy on 8/24/2021 was normal.  Urine cytology 6/2/2022 was normal.     He denies any recent gross hematuria.  He endorses stable urinary urgency with rare urge incontinence.  He does have occasional weak urinary stream particularly at night and is on tamsulosin.  He has nocturia 3 times per night.    At his last visit I increased tamsulosin to 0.8 mg daily.  He declined any further cystoscopies for surveillance.  Urine cytology 10/31/2023 was normal.    Denies gross hematuria.  Moderate weak urinary stream but satisfied at this time and declines further cystoscopy.  Continues to have bothersome urinary urgency.    PVR 94 mL today     AUA symptom score is 15/0 with LUTS.    PSA:  Lab Results   Component Value Date    PSA 0.41 08/29/2020        History:     Past Medical History:    AAA (abdominal aortic aneurysm) (HCC)    Arthritis    Atherosclerosis of native artery of extremity (HCC)    CAD in native artery    Carotid artery stenosis, unspecified laterality    Closed Colles' fracture of right radius with malunion    Closed fracture of dorsal (thoracic) vertebra without mention of spinal cord injury    Log Date: 10/30/2012     DJD (degenerative joint disease), ankle and foot    Enthesopathy of hip region    Heart disease    History of chest pain    History of coronary artery stent placement    Hypercholesterolemia    Hyperlipidemia    Hypertension    Hypertension, benign    Pain in thoracic spine    Log Date: 11/27/2012     Pulmonary embolism (HCC)    Reflux    S/P CABG (coronary artery  bypass graft)    Stress fracture of the metatarsals    Ulcer    Unspecified sleep apnea    AHI 28 S[address]2 Miguel 84%    Wrist pain, right       Past Surgical History:   Procedure Laterality Date    Angioplasty (coronary)      x3    Bypass surgery  1997    Triple     Cath cartotid stent      x4    Fluor gid & loclzj ndl/cath spi dx/ther njx  4/28/2014    Procedure: LUMBAR / TRANSFORAMINAL EPIDURAL STEROID INJECTION;  Surgeon: Jason Pineda DO;  Location: [facility]    Inj for sacroiliac jt anesth Right 9/9/2014    Procedure: SACROILIAC JOINT INJECTION;  Surgeon: Jason Pineda DO;  Location: [facility]    Inj paravert f jnt c/t 1 lev Bilateral 7/15/2014    Procedure: FACET INJECTION UNDER FLUOROSCOPY;  Surgeon: Jason Pineda DO;  Location: [facility]    Inj paravert f jnt c/t 1 lev Bilateral 7/15/2014    Procedure: FACET INJECTION UNDER FLUOROSCOPY;  Surgeon: Jason Pineda DO;  Location: [facility]    Inj paravert f jnt c/t 2 lev Bilateral 7/15/2014    Procedure: FACET INJECTION UNDER FLUOROSCOPY;  Surgeon: Jason Pineda DO;  Location: [facility]    Inj paravert f jnt c/t 2 lev Bilateral 7/15/2014    Procedure: FACET INJECTION UNDER FLUOROSCOPY;  Surgeon: Jason Pineda DO;  Location: [facility]    Injection, w/wo contrast, dx/therapeutic substance, epidural/subarachnoid; lumbar/sacral  4/28/2014    Procedure: LUMBAR / TRANSFORAMINAL EPIDURAL STEROID INJECTION;  Surgeon: Jason Pineda DO;  Location: [facility]    Other surgical history      right wrist    Other surgical history      right elbow    Other surgical history      appy    Other surgical history      tonsils    Other surgical history      8 angio grams    Other surgical history  12/30/16    Cystoscopy- Dr. Mora     Other surgical history  09/08/2017, 3/30/18    Cysto w/ Dr. Mora    Patient documented not to have experienced any of  the following events  2014    Procedure: VAGAL NERVE STIMULATOR INSERTION;  Surgeon: Jason Pineda DO;  Location: [facility]    Patient documented not to have experienced any of the following events Bilateral 7/15/2014    Procedure: FACET INJECTION UNDER FLUOROSCOPY;  Surgeon: Jason Pineda DO;  Location: [facility]    Patient documented not to have experienced any of the following events Right 2014    Procedure: SACROILIAC JOINT INJECTION;  Surgeon: Jason Pineda DO;  Location: [facility]    Patient withough preoperative order for iv antibiotic surgical site infection prophylaxis.  2014    Procedure: LUMBAR / TRANSFORAMINAL EPIDURAL STEROID INJECTION;  Surgeon: Jason Pineda DO;  Location: [facility]    Patient withough preoperative order for iv antibiotic surgical site infection prophylaxis. Bilateral 7/15/2014    Procedure: FACET INJECTION UNDER FLUOROSCOPY;  Surgeon: Jason Pineda DO;  Location: [facility]    Patient withough preoperative order for iv antibiotic surgical site infection prophylaxis. Right 2014    Procedure: SACROILIAC JOINT INJECTION;  Surgeon: Jason Pineda DO;  Location: [facility]    Repair rotator cuff,acute         Family History   Problem Relation Age of Onset    Heart Disorder Father     Other (Other) Father     Other (Other) Mother        Social History     Socioeconomic History    Marital status:    Tobacco Use    Smoking status: Former     Current packs/day: 0.00     Average packs/day: 1.5 packs/day for 28.0 years (42.0 ttl pk-yrs)     Types: Cigarettes     Start date: 1960     Quit date: 1988     Years since quittin.3    Smokeless tobacco: Never   Vaping Use    Vaping status: Never Used   Substance and Sexual Activity    Alcohol use: Yes     Alcohol/week: 0.0 standard drinks of alcohol    Drug use: No   Other Topics Concern    Caffeine  Concern Yes     Comment: 2 cups a day    Exercise Yes     Comment: Stretches     Social Drivers of Health     Financial Resource Strain: Low Risk  (8/17/2023)    Received from [facility]    Financial Resource Strain     In the past year, have you or any family members you live with been unable to get any of the following when it was really needed? Check all that apply.: None   Food Insecurity: No Food Insecurity (8/16/2023)    Received from [facility]    Food Insecurity     How often in the past 12 months would you say you are worried or stressed about having enough money to buy nutritious meals? : Never   Transportation Needs: No Transportation Needs (8/17/2023)    Received from [facility]    PRAPARE - Transportation     Lack of Transportation (Medical): No     Lack of Transportation (Non-Medical): No   Social Connections: Socially Integrated (8/16/2023)    Received from [facility]    Social Connections     How often do you see or talk to people that you care about and feel close to? (For example: talking to friends on the phone, visiting friends or family, going to [employer] or club meetings): 3 to 5 times a week       Medications (Active prior to today's visit):  Current Outpatient Medications   Medication Sig Dispense Refill    Ferrous Sulfate 325 (65 Fe) MG Oral Tab Take 1 tablet (325 mg total) by mouth daily with breakfast.      isosorbide mononitrate ER 30 MG Oral Tablet 24 Hr Take 1 tablet (30 mg total) by mouth daily.      tamsulosin 0.4 MG Oral Cap Take 1 capsule (0.4 mg total) by mouth daily. 30 MIN AFTER SAME MEAL 90 capsule 5    CLOPIDOGREL 75 MG Oral Tab TAKE 1 TABLET EVERY DAY 90 tablet 3    METOPROLOL SUCCINATE 25 MG Oral Tablet 24 Hr TAKE 1 TABLET EVERY DAY 90 tablet 3    AMLODIPINE 5 MG Oral Tab TAKE 1 TABLET EVERY DAY 90 tablet 3    aspirin 81 MG Oral Tab Take 81 mg by mouth daily.      HYDROcodone-acetaminophen (NORCO)  MG Oral Tab Take 1 tablet  by mouth every 8 (eight) hours as needed for Pain. 60 tablet 0    atorvastatin 80 MG Oral Tab Take 0.5 tablets (40 mg total) by mouth daily.      Calcium Carbonate-Vitamin D (CALCIUM + D OR) Take 1,500 mg by mouth 2 (two) times daily.        Multiple Vitamins-Minerals (MULTIVITAMIN OR) Take  by mouth.         Allergies:  Allergies[1]    Review of Systems:   A comprehensive 10-point review of systems was completed.  Pertinent positives and negatives are noted in the the HPI.    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 Mora.  His most recent surveillance cystoscopy on 8/24/2021 was normal.  Urine cytology 6/2/2022 was normal.     He denies any recent gross hematuria.  He endorses stable urinary urgency with rare urge incontinence.  He does have occasional weak urinary stream particularly at night and is on tamsulosin.  He has nocturia 3 times per night.    At his last visit I increased tamsulosin to 0.8 mg daily.  He declined any further cystoscopies for surveillance.  Urine cytology 10/31/2023 was normal.    Denies gross hematuria.  Moderate weak urinary stream but satisfied at this time and declines further cystoscopy.  Continues to have bothersome urinary urgency.    PVR 94 mL today     -Continue tamsulosin  -Start trospium  -Return to clinic in 3-4 months    In total, 30 minutes were spent on this  patient encounter (including chart review, patient history, physical, and counseling, documentation, and communication).    Eyal Ochoa MD  Staff Urologist  [facility]  Office: [phone]             [1]   Allergies  Allergen Reactions    Naproxen RASH

## 2024-11-06 ENCOUNTER — TELEPHONE (OUTPATIENT)
Dept: SURGERY | Facility: CLINIC | Age: 89
End: 2024-11-06

## 2024-11-06 DIAGNOSIS — R35.1 NOCTURIA: ICD-10-CM

## 2024-11-06 DIAGNOSIS — N39.41 URGENCY INCONTINENCE: Primary | ICD-10-CM

## 2024-11-06 DIAGNOSIS — C67.9 UROTHELIAL CARCINOMA OF BLADDER (HCC): ICD-10-CM

## 2024-11-06 DIAGNOSIS — R39.15 URINARY URGENCY: ICD-10-CM

## 2024-11-06 DIAGNOSIS — R39.12 WEAK URINARY STREAM: ICD-10-CM

## 2024-11-07 NOTE — TELEPHONE ENCOUNTER
RN called patient. Declines to pay $169 for Tamsulosin and Trospium- medications sent to [facility]. He is ok to continue with Tamsulosin at [facility]. Asking a different generic medication for Trospium.     RN will call patient back.     RN called [facility] to inquire breakdown of prices: Tamsulosin $3 copay; Trospium $183 (non-formulary). Insurance asking for alternative: oxybutynin, Myrbetriq or Gemtesa.     RN forwarding to PA

## 2024-11-08 NOTE — TELEPHONE ENCOUNTER
Order dose clarified. RN placed Vibegron 75 mg tab (generic of Gemtesa) and sent it to [facility].

## 2025-01-06 ENCOUNTER — WALK IN (OUTPATIENT)
Dept: URGENT CARE | Age: 89
End: 2025-01-06
Attending: EMERGENCY MEDICINE

## 2025-01-06 ENCOUNTER — HOSPITAL ENCOUNTER (OUTPATIENT)
Dept: GENERAL RADIOLOGY | Age: 89
Discharge: HOME | End: 2025-01-06
Attending: EMERGENCY MEDICINE

## 2025-01-06 VITALS
RESPIRATION RATE: 16 BRPM | OXYGEN SATURATION: 97 % | SYSTOLIC BLOOD PRESSURE: 147 MMHG | DIASTOLIC BLOOD PRESSURE: 74 MMHG | TEMPERATURE: 97.2 F | HEART RATE: 61 BPM

## 2025-01-06 DIAGNOSIS — M25.571 ACUTE RIGHT ANKLE PAIN: Primary | ICD-10-CM

## 2025-01-06 DIAGNOSIS — M25.571 ACUTE RIGHT ANKLE PAIN: ICD-10-CM

## 2025-01-06 PROCEDURE — 99212 OFFICE O/P EST SF 10 MIN: CPT

## 2025-01-06 PROCEDURE — 73610 X-RAY EXAM OF ANKLE: CPT

## 2025-01-06 RX ORDER — METHYLPREDNISOLONE 4 MG/1
4 TABLET ORAL SEE ADMIN INSTRUCTIONS
Qty: 21 TABLET | Refills: 0 | Status: SHIPPED | OUTPATIENT
Start: 2025-01-06

## 2025-01-06 ASSESSMENT — ENCOUNTER SYMPTOMS
NUMBNESS: 0
WEAKNESS: 0
COLOR CHANGE: 0
SHORTNESS OF BREATH: 0
FEVER: 0

## 2025-04-09 ENCOUNTER — APPOINTMENT (OUTPATIENT)
Dept: FAMILY MEDICINE | Age: 89
End: 2025-04-09

## 2025-04-15 ENCOUNTER — TELEPHONE (OUTPATIENT)
Dept: FAMILY MEDICINE | Age: 89
End: 2025-04-15

## 2025-04-16 ENCOUNTER — APPOINTMENT (OUTPATIENT)
Dept: FAMILY MEDICINE | Age: 89
End: 2025-04-16

## 2025-04-16 VITALS
BODY MASS INDEX: 30.73 KG/M2 | HEART RATE: 72 BPM | OXYGEN SATURATION: 95 % | SYSTOLIC BLOOD PRESSURE: 132 MMHG | WEIGHT: 180 LBS | TEMPERATURE: 97.3 F | RESPIRATION RATE: 18 BRPM | HEIGHT: 64 IN | DIASTOLIC BLOOD PRESSURE: 82 MMHG

## 2025-04-16 DIAGNOSIS — R13.11 ORAL PHASE DYSPHAGIA: ICD-10-CM

## 2025-04-16 DIAGNOSIS — I70.209 ATHEROSCLEROSIS OF NATIVE ARTERY OF EXTREMITY, UNSPECIFIED EXTREMITY, WITH UNSPECIFIED PRESENCE OF CLINICAL MANIFESTATION (CMD): ICD-10-CM

## 2025-04-16 DIAGNOSIS — M48.061 SPINAL STENOSIS OF LUMBAR REGION WITHOUT NEUROGENIC CLAUDICATION: ICD-10-CM

## 2025-04-16 DIAGNOSIS — F11.20 NARCOTIC DEPENDENCE  (CMD): ICD-10-CM

## 2025-04-16 DIAGNOSIS — S22.41XA CLOSED FRACTURE OF MULTIPLE RIBS OF RIGHT SIDE, INITIAL ENCOUNTER: ICD-10-CM

## 2025-04-16 DIAGNOSIS — M19.90 ARTHRITIS: ICD-10-CM

## 2025-04-16 DIAGNOSIS — I48.11 LONGSTANDING PERSISTENT ATRIAL FIBRILLATION  (CMD): ICD-10-CM

## 2025-04-16 DIAGNOSIS — N39.46 MIXED STRESS AND URGE URINARY INCONTINENCE: Primary | ICD-10-CM

## 2025-04-16 DIAGNOSIS — F43.89 ADJUSTMENT REACTION TO CHRONIC STRESS: ICD-10-CM

## 2025-04-16 DIAGNOSIS — M40.00 ACQUIRED POSTURAL KYPHOSIS: ICD-10-CM

## 2025-04-16 DIAGNOSIS — E78.2 HYPERLIPEMIA, MIXED: ICD-10-CM

## 2025-04-16 PROBLEM — J31.0 GUSTATORY RHINITIS: Status: ACTIVE | Noted: 2025-04-16

## 2025-04-16 RX ORDER — ATORVASTATIN CALCIUM 80 MG/1
1 TABLET, FILM COATED ORAL DAILY
COMMUNITY
Start: 2024-12-17

## 2025-04-16 RX ORDER — DULOXETIN HYDROCHLORIDE 20 MG/1
20 CAPSULE, DELAYED RELEASE ORAL DAILY
Qty: 30 CAPSULE | Refills: 3 | Status: SHIPPED | OUTPATIENT
Start: 2025-04-16

## 2025-04-16 ASSESSMENT — ENCOUNTER SYMPTOMS
BACK PAIN: 1
GASTROINTESTINAL NEGATIVE: 1
ACTIVITY CHANGE: 1
AGITATION: 1
RESPIRATORY NEGATIVE: 1

## 2025-04-16 ASSESSMENT — PATIENT HEALTH QUESTIONNAIRE - PHQ9
SUM OF ALL RESPONSES TO PHQ9 QUESTIONS 1 AND 2: 0
1. LITTLE INTEREST OR PLEASURE IN DOING THINGS: NOT AT ALL
SUM OF ALL RESPONSES TO PHQ9 QUESTIONS 1 AND 2: 0
CLINICAL INTERPRETATION OF PHQ2 SCORE: NO FURTHER SCREENING NEEDED
2. FEELING DOWN, DEPRESSED OR HOPELESS: NOT AT ALL

## 2025-04-16 ASSESSMENT — PAIN SCALES - GENERAL: PAINLEVEL_OUTOF10: 0

## 2025-04-17 ENCOUNTER — E-ADVICE (OUTPATIENT)
Dept: FAMILY MEDICINE | Age: 89
End: 2025-04-17

## 2025-04-17 LAB
AMPHETAMINES UR QL SCN>500 NG/ML: NEGATIVE
BARBITURATES UR QL SCN>200 NG/ML: NEGATIVE
BENZODIAZ UR QL SCN>200 NG/ML: NEGATIVE
BZE UR QL SCN>150 NG/ML: NEGATIVE
CANNABINOIDS UR QL SCN>50 NG/ML: NEGATIVE
FENTANYL UR QL SCN: NEGATIVE
OPIATES UR QL SCN>300 NG/ML: NEGATIVE
PCP UR QL SCN>25 NG/ML: NEGATIVE

## 2025-04-18 ENCOUNTER — MED REC SCAN ONLY (OUTPATIENT)
Dept: SURGERY | Facility: CLINIC | Age: 89
End: 2025-04-18

## 2025-04-25 DIAGNOSIS — R31.29 HEMATURIA, MICROSCOPIC: Primary | ICD-10-CM

## 2025-05-05 ENCOUNTER — APPOINTMENT (OUTPATIENT)
Dept: ULTRASOUND IMAGING | Age: 89
End: 2025-05-05
Attending: UROLOGY

## 2025-05-05 ENCOUNTER — HOSPITAL ENCOUNTER (OUTPATIENT)
Dept: ULTRASOUND IMAGING | Age: 89
Discharge: HOME | End: 2025-05-05
Attending: UROLOGY

## 2025-05-05 DIAGNOSIS — R31.29 HEMATURIA, MICROSCOPIC: ICD-10-CM

## 2025-05-05 PROCEDURE — 76775 US EXAM ABDO BACK WALL LIM: CPT

## 2025-05-05 RX ORDER — ALFUZOSIN HYDROCHLORIDE 10 MG/1
10 TABLET, EXTENDED RELEASE ORAL DAILY
COMMUNITY

## 2025-05-05 RX ORDER — LOSARTAN POTASSIUM 25 MG/1
25 TABLET ORAL DAILY
COMMUNITY

## 2025-05-05 RX ORDER — ATORVASTATIN CALCIUM 80 MG/1
80 TABLET, FILM COATED ORAL NIGHTLY
COMMUNITY

## 2025-05-05 RX ORDER — DULOXETIN HYDROCHLORIDE 20 MG/1
20 CAPSULE, DELAYED RELEASE ORAL DAILY
COMMUNITY

## 2025-05-12 ENCOUNTER — LAB ENCOUNTER (OUTPATIENT)
Dept: LAB | Age: 89
End: 2025-05-12
Attending: ORTHOPAEDIC SURGERY
Payer: MEDICARE

## 2025-05-12 DIAGNOSIS — I10 HYPERTENSION: ICD-10-CM

## 2025-05-12 DIAGNOSIS — M75.101 ROTATOR CUFF SYNDROME OF RIGHT SHOULDER: ICD-10-CM

## 2025-05-12 DIAGNOSIS — Z01.818 PRE-OP TESTING: ICD-10-CM

## 2025-05-12 LAB
ANION GAP SERPL CALC-SCNC: 3 MMOL/L (ref 0–18)
ANTIBODY SCREEN: NEGATIVE
BUN BLD-MCNC: 15 MG/DL (ref 9–23)
CALCIUM BLD-MCNC: 9.6 MG/DL (ref 8.7–10.6)
CHLORIDE SERPL-SCNC: 108 MMOL/L (ref 98–112)
CO2 SERPL-SCNC: 26 MMOL/L (ref 21–32)
CREAT BLD-MCNC: 1.1 MG/DL (ref 0.7–1.3)
EGFRCR SERPLBLD CKD-EPI 2021: 65 ML/MIN/1.73M2 (ref 60–?)
ERYTHROCYTE [DISTWIDTH] IN BLOOD BY AUTOMATED COUNT: 12.9 %
GLUCOSE BLD-MCNC: 113 MG/DL (ref 70–99)
HCT VFR BLD AUTO: 37.6 % (ref 39–53)
HGB BLD-MCNC: 12.4 G/DL (ref 13–17.5)
MCH RBC QN AUTO: 32 PG (ref 26–34)
MCHC RBC AUTO-ENTMCNC: 33 G/DL (ref 31–37)
MCV RBC AUTO: 97.2 FL (ref 80–100)
OSMOLALITY SERPL CALC.SUM OF ELEC: 286 MOSM/KG (ref 275–295)
PLATELET # BLD AUTO: 223 10(3)UL (ref 150–450)
POTASSIUM SERPL-SCNC: 4.6 MMOL/L (ref 3.5–5.1)
RBC # BLD AUTO: 3.87 X10(6)UL (ref 3.8–5.8)
RH BLOOD TYPE: NEGATIVE
SODIUM SERPL-SCNC: 137 MMOL/L (ref 136–145)
WBC # BLD AUTO: 6.8 X10(3) UL (ref 4–11)

## 2025-05-12 PROCEDURE — 87081 CULTURE SCREEN ONLY: CPT

## 2025-05-12 PROCEDURE — 86850 RBC ANTIBODY SCREEN: CPT

## 2025-05-12 PROCEDURE — 80048 BASIC METABOLIC PNL TOTAL CA: CPT

## 2025-05-12 PROCEDURE — 36415 COLL VENOUS BLD VENIPUNCTURE: CPT

## 2025-05-12 PROCEDURE — 86901 BLOOD TYPING SEROLOGIC RH(D): CPT

## 2025-05-12 PROCEDURE — 86900 BLOOD TYPING SEROLOGIC ABO: CPT

## 2025-05-12 PROCEDURE — 85027 COMPLETE CBC AUTOMATED: CPT

## 2025-05-14 ENCOUNTER — ANESTHESIA EVENT (OUTPATIENT)
Dept: SURGERY | Facility: HOSPITAL | Age: 89
End: 2025-05-14
Payer: MEDICARE

## 2025-05-21 NOTE — H&P
Right Shoulder - Follow - Up MRI 4/26/2025     Progress Notes  Phoenix Murguia MD (Physician)  Surgery, Orthopedic  Expand All Collapse All  5/21/2025  Gigi Salazar  3/18/1935  89 year old   male  Phoenix Murguia MD     HPI:   Patient presents with:  Right Shoulder - Follow - Up: MRI 4/26/2025        HPI: Gigi is had his MRI.  This shows it is severely degraded exam but there is a full-thickness tear of the supraspinatus tendon with probable partial undersurface tear of the infraspinatus tendon and a high-grade partial-thickness tear of the subscapularis tendon.  The biceps is nonvisualized.  AC joint diastases     ALLERGIES:    Allergies      Naproxen                RASH             Current Outpatient Medications   Medication Sig Dispense Refill    amLODIPine 5 MG Oral Tab TAKE 1 TABLET EVERY DAY 90 tablet 0    METOPROLOL SUCCINATE ER 25 MG Oral Tablet 24 Hr TAKE 1 TABLET EVERY DAY 90 tablet 3    ATORVASTATIN 80 MG Oral Tab TAKE 1 TABLET EVERY DAY 90 tablet 3    losartan 25 MG Oral Tab Take 1 tablet (25 mg total) by mouth daily. 90 tablet 3    tamsulosin (FLOMAX) cap Take 1 capsule (0.4 mg total) by mouth daily. 30 MIN AFTER SAME MEAL 90 capsule 2    Pantoprazole Sodium 40 MG Oral Tab EC Take 40 mg by mouth.        aspirin 81 MG Oral Tab Take 81 mg by mouth daily.        HYDROcodone-acetaminophen (NORCO)  MG Oral Tab Take 1 tablet by mouth every 8 (eight) hours as needed for Pain. 60 tablet 0    Multiple Vitamins-Minerals (MULTIVITAMIN OR) Take  by mouth.          HISTORY:       Past Medical History:   Diagnosis Date    AAA (abdominal aortic aneurysm) ([facility]) 5/19/2015    Arthritis      Atherosclerosis of native artery of extremity ([facility]) 5/19/2015    CAD in native artery 5/19/2015    Carotid artery stenosis, unspecified laterality 5/19/2015    Closed Colles' fracture of right radius with malunion 12/15/2014    Closed fracture of dorsal (thoracic) vertebra without mention of spinal cord injury  6/20/2013     Log Date: 10/30/2012     DJD (degenerative joint disease), ankle and foot 6/27/2014    Enthesopathy of hip region 8/1/2014    Heart disease      History of chest pain      History of coronary artery stent placement 5/19/2015    Hypercholesterolemia      Hyperlipidemia      Hypertension      Hypertension, benign 5/19/2015    Pain in thoracic spine 6/20/2013     Log Date: 11/27/2012     Pulmonary embolism (HCC) 07/2018    Reflux      S/P CABG (coronary artery bypass graft) 5/19/2015    Stress fracture of the metatarsals 6/27/2014    Ulcer      Unspecified sleep apnea PSG 8-17-15     AHI 28 S[address]2 Miguel 84%    Wrist pain, right 12/15/2014            Past Surgical History:   Procedure Laterality Date    ANGIOPLASTY (CORONARY)         x3    BYPASS SURGERY   1997     Triple     CATH CARTOTID STENT         x4    FLUOR NEEDLE/CATH SPINE/PARASPINAL DX/THER ADDON   4/28/2014     Procedure: LUMBAR / TRANSFORAMINAL EPIDURAL STEROID INJECTION;  Surgeon: Jason Pineda, ;  Location: [facility]    INJ FOR SACROILIAC JT ANESTH Right 9/9/2014     Procedure: SACROILIAC JOINT INJECTION;  Surgeon: Jason Pineda DO;  Location: [facility]    INJECTION, W/WO CONTRAST, DX/THERAPEUTIC SUBSTANCE, EPIDURAL/SUBARACHNOID; LUMBAR/SACRAL   4/28/2014     Procedure: LUMBAR / TRANSFORAMINAL EPIDURAL STEROID INJECTION;  Surgeon: Jason Pineda DO;  Location: [facility]    NJX DX/THER AGT PVRT FACET JT CRV/THRC 1 LEVEL Bilateral 7/15/2014     Procedure: FACET INJECTION UNDER FLUOROSCOPY;  Surgeon: Jason Pineda DO;  Location: [facility]    NJX DX/THER AGT PVRT FACET JT CRV/THRC 1 LEVEL Bilateral 7/15/2014     Procedure: FACET INJECTION UNDER FLUOROSCOPY;  Surgeon: Jason Pineda DO;  Location: [facility]    NJX DX/THER AGT PVRT FACET JT CRV/THRC 2ND LEVEL Bilateral 7/15/2014     Procedure: FACET INJECTION UNDER FLUOROSCOPY;  Surgeon: Jason Pineda,  ;  Location: [facility]    NJX DX/THER AGT PVRT FACET JT CRV/THRC 2ND LEVEL Bilateral 7/15/2014     Procedure: FACET INJECTION UNDER FLUOROSCOPY;  Surgeon: Jason Pineda DO;  Location: [facility]    OPEN REPAIR OF ROTATOR CUFF ACUTE        OTHER SURGICAL HISTORY         right wrist    OTHER SURGICAL HISTORY         right elbow    OTHER SURGICAL HISTORY         appy    OTHER SURGICAL HISTORY         tonsils    OTHER SURGICAL HISTORY         8 angio grams    OTHER SURGICAL HISTORY   12/30/16     Cystoscopy- Dr. Mora     OTHER SURGICAL HISTORY   09/08/2017, 3/30/18     Cysto w/ Dr. Mora    PATIENT DOCUMENTED NOT TO HAVE EXPERIENCED ANY OF THE FOLLOWING EVENTS   4/28/2014     Procedure: VAGAL NERVE STIMULATOR INSERTION;  Surgeon: Jason Pinead DO;  Location: [facility]    PATIENT DOCUMENTED NOT TO HAVE EXPERIENCED ANY OF THE FOLLOWING EVENTS Bilateral 7/15/2014     Procedure: FACET INJECTION UNDER FLUOROSCOPY;  Surgeon: Jason Pineda DO;  Location: [facility]    PATIENT DOCUMENTED NOT TO HAVE EXPERIENCED ANY OF THE FOLLOWING EVENTS Right 9/9/2014     Procedure: SACROILIAC JOINT INJECTION;  Surgeon: Jason Pineda DO;  Location: [facility]    PATIENT WITHOUGH PREOPERATIVE ORDER FOR IV ANTIBIOTIC SURGICAL SITE INFECTION PROPHYLAXIS.   4/28/2014     Procedure: LUMBAR / TRANSFORAMINAL EPIDURAL STEROID INJECTION;  Surgeon: Jason Pineda DO;  Location: [facility]    PATIENT WITHOUGH PREOPERATIVE ORDER FOR IV ANTIBIOTIC SURGICAL SITE INFECTION PROPHYLAXIS. Bilateral 7/15/2014     Procedure: FACET INJECTION UNDER FLUOROSCOPY;  Surgeon: Jason Pineda DO;  Location: [facility]    PATIENT WITHOUGH PREOPERATIVE ORDER FOR IV ANTIBIOTIC SURGICAL SITE INFECTION PROPHYLAXIS. Right 9/9/2014     Procedure: SACROILIAC JOINT INJECTION;  Surgeon: Jason Pineda DO;  Location: [facility]             Family History   Problem Relation Age of Onset    Heart Disorder Father      Other (Other) Father      Other (Other) Mother        Social History    Tobacco Use      Smoking status: Former        Packs/day: 0.00        Years: 1.5 packs/day for 28.0 years (42.0 ttl pk-yrs)        Types: Cigarettes        Start date: 1960        Quit date: 1988        Years since quittin.8      Smokeless tobacco: Never    Vaping Use      Vaping status: Never Used    Alcohol use: Yes      Alcohol/week: 0.0 standard drinks of alcohol    Drug use: No            REVIEW OF SYSTEMS:   A 12 point review of systems was performed as documented on the intake form and reviewed by me today with pertinent positives and negatives listed in the HPI.     EXAM:   GENERAL: normocehpalic  well developed, well nourished, and in no apparent distress  SKIN: no rashes,no suspicious lesions  MOUTH, NOSE: nasal mucosa pink w/o discharge. Oropharynx is pink with no exudate or erythema. No masses or leukoplakia   RESPIRATORY: normal breath sounds bilaterally. No crackles, rubs or wheezing   CARDIOVASCULAR: RRR. Normal heart sounds. No murmur, gallops, or rubs  ABDOMEN: soft, NT/ND. Normal bowl sounds   Extremities: He has pain with abduction of his right shoulder as well as some grind.     IMAGING AND TESTING:  I have reviewed the imaging with Gigi and his wife in the office today.  I think there is some narrowing of the joint cartilage in addition     ASSESSMENT AND PLAN:      My recommendation would be a reverse total shoulder arthroplasty as opposed to trying to repair this tear.  I do see some atrophy not only of the supraspinatus but the other muscles on examination.  I believe that a attempt at a primary repair of the rotator cuff would likely fail and would likely need the reverse total shoulder down the line anyways.  I recommend we start out with that.  Procedure alternatives risk discussed.  Questions encouraged and answered.         All of their questions were answered and they are in agreement with the treatment plan.

## 2025-05-22 ENCOUNTER — HOSPITAL ENCOUNTER (OUTPATIENT)
Facility: HOSPITAL | Age: 89
Discharge: HOME | End: 2025-05-23
Attending: ORTHOPAEDIC SURGERY | Admitting: ORTHOPAEDIC SURGERY
Payer: MEDICARE

## 2025-05-22 ENCOUNTER — ANESTHESIA (OUTPATIENT)
Dept: SURGERY | Facility: HOSPITAL | Age: 89
End: 2025-05-22
Payer: MEDICARE

## 2025-05-22 ENCOUNTER — APPOINTMENT (OUTPATIENT)
Dept: GENERAL RADIOLOGY | Facility: HOSPITAL | Age: 89
End: 2025-05-22
Attending: ORTHOPAEDIC SURGERY
Payer: MEDICARE

## 2025-05-22 DIAGNOSIS — M75.101 ROTATOR CUFF SYNDROME OF RIGHT SHOULDER: Primary | ICD-10-CM

## 2025-05-22 DIAGNOSIS — M12.811 RIGHT ROTATOR CUFF TEAR ARTHROPATHY: ICD-10-CM

## 2025-05-22 DIAGNOSIS — Z01.818 PRE-OP TESTING: ICD-10-CM

## 2025-05-22 DIAGNOSIS — M75.101 RIGHT ROTATOR CUFF TEAR ARTHROPATHY: ICD-10-CM

## 2025-05-22 DIAGNOSIS — I10 HYPERTENSION: ICD-10-CM

## 2025-05-22 LAB — RH BLOOD TYPE: NEGATIVE

## 2025-05-22 PROCEDURE — 73030 X-RAY EXAM OF SHOULDER: CPT | Performed by: ORTHOPAEDIC SURGERY

## 2025-05-22 PROCEDURE — 94760 N-INVAS EAR/PLS OXIMETRY 1: CPT

## 2025-05-22 PROCEDURE — 76942 ECHO GUIDE FOR BIOPSY: CPT | Performed by: STUDENT IN AN ORGANIZED HEALTH CARE EDUCATION/TRAINING PROGRAM

## 2025-05-22 DEVICE — INHANCE SHOULDER SYSTEM  REVERSE LINER X-LINKED VITAMIN E PE   Ø36+0MM
Type: IMPLANTABLE DEVICE | Site: SHOULDER | Status: FUNCTIONAL
Brand: INHANCE

## 2025-05-22 DEVICE — INHANCE SHOULDER SYSTEM REVERSE HUMERAL SHELL LARGE Ø40+0MM
Type: IMPLANTABLE DEVICE | Site: SHOULDER | Status: FUNCTIONAL
Brand: INHANCE

## 2025-05-22 DEVICE — INHANCE SHOULDER SYSTEM  LOCKING SCREW 30MM
Type: IMPLANTABLE DEVICE | Site: SHOULDER | Status: FUNCTIONAL
Brand: INHANCE

## 2025-05-22 DEVICE — INHANCE SHOULDER SYSTEM GLENOSPHERE Ø36+4MM
Type: IMPLANTABLE DEVICE | Site: SHOULDER | Status: FUNCTIONAL
Brand: INHANCE

## 2025-05-22 DEVICE — INHANCE SHOULDER SYSTEM GLENOID PIN Ø3.5MM X 230MM
Type: IMPLANTABLE DEVICE | Site: SHOULDER | Status: FUNCTIONAL
Brand: INHANCE

## 2025-05-22 DEVICE — INHANCE SHOULDER SYSTEM  STANDARD STEM LARGE Ø40MM X 121MM
Type: IMPLANTABLE DEVICE | Site: SHOULDER | Status: FUNCTIONAL
Brand: INHANCE

## 2025-05-22 DEVICE — INHANCE SHOULDER SYSTEM MODULAR UNITI PLATFORM BASEPLATE SMALL Ø24MM CEMENTLESS
Type: IMPLANTABLE DEVICE | Site: SHOULDER | Status: FUNCTIONAL
Brand: INHANCE

## 2025-05-22 DEVICE — INHANCE SHOULDER SYSTEM  CENTRAL SCREW Ø6.0 X 30MM
Type: IMPLANTABLE DEVICE | Site: SHOULDER | Status: FUNCTIONAL
Brand: INHANCE

## 2025-05-22 DEVICE — INHANCE SHOULDER SYSTEM  LOCKING SCREW 25MM
Type: IMPLANTABLE DEVICE | Site: SHOULDER | Status: FUNCTIONAL
Brand: INHANCE

## 2025-05-22 RX ORDER — METOPROLOL SUCCINATE 25 MG/1
25 TABLET, EXTENDED RELEASE ORAL DAILY
Status: DISCONTINUED | OUTPATIENT
Start: 2025-05-23 | End: 2025-05-23

## 2025-05-22 RX ORDER — PHENYLEPHRINE HCL 10 MG/ML
VIAL (ML) INJECTION AS NEEDED
Status: DISCONTINUED | OUTPATIENT
Start: 2025-05-22 | End: 2025-05-22 | Stop reason: SURG

## 2025-05-22 RX ORDER — SODIUM CHLORIDE 9 MG/ML
INJECTION, SOLUTION INTRAVENOUS CONTINUOUS
Status: DISCONTINUED | OUTPATIENT
Start: 2025-05-22 | End: 2025-05-23

## 2025-05-22 RX ORDER — ATORVASTATIN CALCIUM 40 MG/1
80 TABLET, FILM COATED ORAL NIGHTLY
Status: DISCONTINUED | OUTPATIENT
Start: 2025-05-22 | End: 2025-05-23

## 2025-05-22 RX ORDER — ONDANSETRON 2 MG/ML
4 INJECTION INTRAMUSCULAR; INTRAVENOUS EVERY 6 HOURS PRN
Status: DISCONTINUED | OUTPATIENT
Start: 2025-05-22 | End: 2025-05-23

## 2025-05-22 RX ORDER — ROCURONIUM BROMIDE 10 MG/ML
INJECTION, SOLUTION INTRAVENOUS AS NEEDED
Status: DISCONTINUED | OUTPATIENT
Start: 2025-05-22 | End: 2025-05-22 | Stop reason: SURG

## 2025-05-22 RX ORDER — EPHEDRINE SULFATE 50 MG/ML
INJECTION INTRAVENOUS AS NEEDED
Status: DISCONTINUED | OUTPATIENT
Start: 2025-05-22 | End: 2025-05-22 | Stop reason: SURG

## 2025-05-22 RX ORDER — HYDROMORPHONE HYDROCHLORIDE 1 MG/ML
0.4 INJECTION, SOLUTION INTRAMUSCULAR; INTRAVENOUS; SUBCUTANEOUS EVERY 5 MIN PRN
Status: DISCONTINUED | OUTPATIENT
Start: 2025-05-22 | End: 2025-05-22 | Stop reason: HOSPADM

## 2025-05-22 RX ORDER — ACETAMINOPHEN 500 MG
1000 TABLET ORAL ONCE
Status: DISCONTINUED | OUTPATIENT
Start: 2025-05-22 | End: 2025-05-22 | Stop reason: HOSPADM

## 2025-05-22 RX ORDER — METOCLOPRAMIDE HYDROCHLORIDE 5 MG/ML
10 INJECTION INTRAMUSCULAR; INTRAVENOUS EVERY 8 HOURS PRN
Status: DISCONTINUED | OUTPATIENT
Start: 2025-05-22 | End: 2025-05-23

## 2025-05-22 RX ORDER — SODIUM CHLORIDE, SODIUM LACTATE, POTASSIUM CHLORIDE, CALCIUM CHLORIDE 600; 310; 30; 20 MG/100ML; MG/100ML; MG/100ML; MG/100ML
INJECTION, SOLUTION INTRAVENOUS CONTINUOUS
Status: DISCONTINUED | OUTPATIENT
Start: 2025-05-22 | End: 2025-05-23

## 2025-05-22 RX ORDER — ALFUZOSIN HYDROCHLORIDE 10 MG/1
10 TABLET, EXTENDED RELEASE ORAL DAILY
Status: DISCONTINUED | OUTPATIENT
Start: 2025-05-23 | End: 2025-05-22 | Stop reason: RX

## 2025-05-22 RX ORDER — HYDROMORPHONE HYDROCHLORIDE 1 MG/ML
0.2 INJECTION, SOLUTION INTRAMUSCULAR; INTRAVENOUS; SUBCUTANEOUS EVERY 5 MIN PRN
Status: DISCONTINUED | OUTPATIENT
Start: 2025-05-22 | End: 2025-05-22 | Stop reason: HOSPADM

## 2025-05-22 RX ORDER — DIPHENHYDRAMINE HYDROCHLORIDE 50 MG/ML
25 INJECTION, SOLUTION INTRAMUSCULAR; INTRAVENOUS ONCE AS NEEDED
Status: ACTIVE | OUTPATIENT
Start: 2025-05-22 | End: 2025-05-22

## 2025-05-22 RX ORDER — AMLODIPINE BESYLATE 5 MG/1
5 TABLET ORAL DAILY
Status: DISCONTINUED | OUTPATIENT
Start: 2025-05-23 | End: 2025-05-23

## 2025-05-22 RX ORDER — DEXAMETHASONE SODIUM PHOSPHATE 4 MG/ML
VIAL (ML) INJECTION AS NEEDED
Status: DISCONTINUED | OUTPATIENT
Start: 2025-05-22 | End: 2025-05-22 | Stop reason: SURG

## 2025-05-22 RX ORDER — HYDROMORPHONE HYDROCHLORIDE 1 MG/ML
0.4 INJECTION, SOLUTION INTRAMUSCULAR; INTRAVENOUS; SUBCUTANEOUS EVERY 2 HOUR PRN
Status: DISCONTINUED | OUTPATIENT
Start: 2025-05-22 | End: 2025-05-23

## 2025-05-22 RX ORDER — BISACODYL 10 MG
10 SUPPOSITORY, RECTAL RECTAL
Status: DISCONTINUED | OUTPATIENT
Start: 2025-05-22 | End: 2025-05-23

## 2025-05-22 RX ORDER — HYDROMORPHONE HYDROCHLORIDE 1 MG/ML
0.2 INJECTION, SOLUTION INTRAMUSCULAR; INTRAVENOUS; SUBCUTANEOUS EVERY 2 HOUR PRN
Status: DISCONTINUED | OUTPATIENT
Start: 2025-05-22 | End: 2025-05-23

## 2025-05-22 RX ORDER — POLYETHYLENE GLYCOL 3350 17 G/17G
17 POWDER, FOR SOLUTION ORAL DAILY PRN
Status: DISCONTINUED | OUTPATIENT
Start: 2025-05-22 | End: 2025-05-23

## 2025-05-22 RX ORDER — OXYCODONE HYDROCHLORIDE 5 MG/1
2.5 TABLET ORAL EVERY 4 HOURS PRN
Status: DISCONTINUED | OUTPATIENT
Start: 2025-05-22 | End: 2025-05-23

## 2025-05-22 RX ORDER — TAMSULOSIN HYDROCHLORIDE 0.4 MG/1
0.4 CAPSULE ORAL
Status: DISCONTINUED | OUTPATIENT
Start: 2025-05-23 | End: 2025-05-23

## 2025-05-22 RX ORDER — TRANEXAMIC ACID 10 MG/ML
1000 INJECTION, SOLUTION INTRAVENOUS ONCE
Status: COMPLETED | OUTPATIENT
Start: 2025-05-22 | End: 2025-05-22

## 2025-05-22 RX ORDER — DEXAMETHASONE SODIUM PHOSPHATE 10 MG/ML
8 INJECTION, SOLUTION INTRAMUSCULAR; INTRAVENOUS ONCE
Status: COMPLETED | OUTPATIENT
Start: 2025-05-23 | End: 2025-05-23

## 2025-05-22 RX ORDER — ROPIVACAINE HYDROCHLORIDE 5 MG/ML
INJECTION, SOLUTION EPIDURAL; INFILTRATION; PERINEURAL AS NEEDED
Status: DISCONTINUED | OUTPATIENT
Start: 2025-05-22 | End: 2025-05-22 | Stop reason: SURG

## 2025-05-22 RX ORDER — LOSARTAN POTASSIUM 25 MG/1
25 TABLET ORAL DAILY
Status: DISCONTINUED | OUTPATIENT
Start: 2025-05-23 | End: 2025-05-23

## 2025-05-22 RX ORDER — OXYCODONE HYDROCHLORIDE 5 MG/1
5 TABLET ORAL EVERY 4 HOURS PRN
Status: DISCONTINUED | OUTPATIENT
Start: 2025-05-22 | End: 2025-05-23

## 2025-05-22 RX ORDER — OXYCODONE HYDROCHLORIDE 5 MG/1
10 TABLET ORAL EVERY 4 HOURS PRN
Status: DISCONTINUED | OUTPATIENT
Start: 2025-05-22 | End: 2025-05-22 | Stop reason: HOSPADM

## 2025-05-22 RX ORDER — GABAPENTIN 100 MG/1
100 CAPSULE ORAL NIGHTLY
COMMUNITY

## 2025-05-22 RX ORDER — ACETAMINOPHEN 500 MG
1000 TABLET ORAL EVERY 8 HOURS SCHEDULED
Status: DISCONTINUED | OUTPATIENT
Start: 2025-05-22 | End: 2025-05-23

## 2025-05-22 RX ORDER — METOCLOPRAMIDE HYDROCHLORIDE 5 MG/ML
INJECTION INTRAMUSCULAR; INTRAVENOUS AS NEEDED
Status: DISCONTINUED | OUTPATIENT
Start: 2025-05-22 | End: 2025-05-22 | Stop reason: SURG

## 2025-05-22 RX ORDER — DOCUSATE SODIUM 100 MG/1
100 CAPSULE, LIQUID FILLED ORAL 2 TIMES DAILY
Status: DISCONTINUED | OUTPATIENT
Start: 2025-05-22 | End: 2025-05-23

## 2025-05-22 RX ORDER — DULOXETIN HYDROCHLORIDE 20 MG/1
20 CAPSULE, DELAYED RELEASE ORAL DAILY
Status: DISCONTINUED | OUTPATIENT
Start: 2025-05-23 | End: 2025-05-23

## 2025-05-22 RX ORDER — HYDROMORPHONE HYDROCHLORIDE 1 MG/ML
0.6 INJECTION, SOLUTION INTRAMUSCULAR; INTRAVENOUS; SUBCUTANEOUS EVERY 5 MIN PRN
Status: DISCONTINUED | OUTPATIENT
Start: 2025-05-22 | End: 2025-05-22 | Stop reason: HOSPADM

## 2025-05-22 RX ORDER — SODIUM PHOSPHATE, DIBASIC AND SODIUM PHOSPHATE, MONOBASIC 7; 19 G/230ML; G/230ML
1 ENEMA RECTAL ONCE AS NEEDED
Status: DISCONTINUED | OUTPATIENT
Start: 2025-05-22 | End: 2025-05-23

## 2025-05-22 RX ORDER — OXYCODONE HYDROCHLORIDE 5 MG/1
2.5 TABLET ORAL EVERY 4 HOURS PRN
Status: DISCONTINUED | OUTPATIENT
Start: 2025-05-22 | End: 2025-05-22 | Stop reason: HOSPADM

## 2025-05-22 RX ORDER — SENNOSIDES 8.6 MG
17.2 TABLET ORAL NIGHTLY
Status: DISCONTINUED | OUTPATIENT
Start: 2025-05-22 | End: 2025-05-23

## 2025-05-22 RX ORDER — ONDANSETRON 2 MG/ML
INJECTION INTRAMUSCULAR; INTRAVENOUS AS NEEDED
Status: DISCONTINUED | OUTPATIENT
Start: 2025-05-22 | End: 2025-05-22 | Stop reason: SURG

## 2025-05-22 RX ORDER — ONDANSETRON 2 MG/ML
4 INJECTION INTRAMUSCULAR; INTRAVENOUS EVERY 6 HOURS PRN
Status: DISCONTINUED | OUTPATIENT
Start: 2025-05-22 | End: 2025-05-22 | Stop reason: HOSPADM

## 2025-05-22 RX ORDER — ACETAMINOPHEN 500 MG
1000 TABLET ORAL ONCE AS NEEDED
Status: DISCONTINUED | OUTPATIENT
Start: 2025-05-22 | End: 2025-05-22 | Stop reason: HOSPADM

## 2025-05-22 RX ORDER — NALOXONE HYDROCHLORIDE 0.4 MG/ML
0.08 INJECTION, SOLUTION INTRAMUSCULAR; INTRAVENOUS; SUBCUTANEOUS AS NEEDED
Status: DISCONTINUED | OUTPATIENT
Start: 2025-05-22 | End: 2025-05-22 | Stop reason: HOSPADM

## 2025-05-22 RX ORDER — GABAPENTIN 100 MG/1
100 CAPSULE ORAL NIGHTLY
Status: DISCONTINUED | OUTPATIENT
Start: 2025-05-22 | End: 2025-05-23

## 2025-05-22 RX ORDER — LIDOCAINE HYDROCHLORIDE 10 MG/ML
INJECTION, SOLUTION EPIDURAL; INFILTRATION; INTRACAUDAL; PERINEURAL AS NEEDED
Status: DISCONTINUED | OUTPATIENT
Start: 2025-05-22 | End: 2025-05-22 | Stop reason: SURG

## 2025-05-22 RX ORDER — SODIUM CHLORIDE, SODIUM LACTATE, POTASSIUM CHLORIDE, CALCIUM CHLORIDE 600; 310; 30; 20 MG/100ML; MG/100ML; MG/100ML; MG/100ML
INJECTION, SOLUTION INTRAVENOUS CONTINUOUS
Status: DISCONTINUED | OUTPATIENT
Start: 2025-05-22 | End: 2025-05-22 | Stop reason: HOSPADM

## 2025-05-22 RX ORDER — METOCLOPRAMIDE HYDROCHLORIDE 5 MG/ML
10 INJECTION INTRAMUSCULAR; INTRAVENOUS EVERY 8 HOURS PRN
Status: DISCONTINUED | OUTPATIENT
Start: 2025-05-22 | End: 2025-05-22 | Stop reason: HOSPADM

## 2025-05-22 RX ORDER — OXYCODONE HYDROCHLORIDE 5 MG/1
5 TABLET ORAL EVERY 4 HOURS PRN
Status: DISCONTINUED | OUTPATIENT
Start: 2025-05-22 | End: 2025-05-22 | Stop reason: HOSPADM

## 2025-05-22 RX ADMIN — TRANEXAMIC ACID 1000 MG: 10 INJECTION, SOLUTION INTRAVENOUS at 12:55:00

## 2025-05-22 RX ADMIN — ROCURONIUM BROMIDE 50 MG: 10 INJECTION, SOLUTION INTRAVENOUS at 12:47:00

## 2025-05-22 RX ADMIN — METOCLOPRAMIDE HYDROCHLORIDE 10 MG: 5 INJECTION INTRAMUSCULAR; INTRAVENOUS at 13:09:00

## 2025-05-22 RX ADMIN — SODIUM CHLORIDE, SODIUM LACTATE, POTASSIUM CHLORIDE, CALCIUM CHLORIDE: 600; 310; 30; 20 INJECTION, SOLUTION INTRAVENOUS at 15:08:00

## 2025-05-22 RX ADMIN — EPHEDRINE SULFATE 10 MG: 50 INJECTION INTRAVENOUS at 12:55:00

## 2025-05-22 RX ADMIN — ROPIVACAINE HYDROCHLORIDE 20 ML: 5 INJECTION, SOLUTION EPIDURAL; INFILTRATION; PERINEURAL at 12:43:00

## 2025-05-22 RX ADMIN — DEXAMETHASONE SODIUM PHOSPHATE 4 MG: 4 MG/ML VIAL (ML) INJECTION at 13:09:00

## 2025-05-22 RX ADMIN — ROCURONIUM BROMIDE 20 MG: 10 INJECTION, SOLUTION INTRAVENOUS at 14:11:00

## 2025-05-22 RX ADMIN — LIDOCAINE HYDROCHLORIDE 50 MG: 10 INJECTION, SOLUTION EPIDURAL; INFILTRATION; INTRACAUDAL; PERINEURAL at 12:46:00

## 2025-05-22 RX ADMIN — SODIUM CHLORIDE, SODIUM LACTATE, POTASSIUM CHLORIDE, CALCIUM CHLORIDE: 600; 310; 30; 20 INJECTION, SOLUTION INTRAVENOUS at 12:29:00

## 2025-05-22 RX ADMIN — PHENYLEPHRINE HCL 100 MCG: 10 MG/ML VIAL (ML) INJECTION at 12:52:00

## 2025-05-22 RX ADMIN — EPHEDRINE SULFATE 10 MG: 50 INJECTION INTRAVENOUS at 12:58:00

## 2025-05-22 RX ADMIN — ONDANSETRON 4 MG: 2 INJECTION INTRAMUSCULAR; INTRAVENOUS at 14:29:00

## 2025-05-22 NOTE — OPERATIVE REPORT
[facility]    PATIENT'S NAME: KOLE HOUSE   ATTENDING PHYSICIAN: Phoenix Murguia M.D.   OPERATING PHYSICIAN: Phoenix Murguia M.D.   PATIENT ACCOUNT#:   [id number]    LOCATION:  PACU  PACU 3 [facility] 10  MEDICAL RECORD #:   [id number]       YOB: 1935  ADMISSION DATE:       05/22/2025      OPERATION DATE:  05/22/2025    OPERATIVE REPORT    PREOPERATIVE DIAGNOSIS:  Inoperable right rotator cuff tear.  POSTOPERATIVE DIAGNOSIS:  Inoperable right rotator cuff tear.  PROCEDURE:  Reverse right total shoulder arthroplasty.    ASSISTANT:  Anahi Liao PA-C    ANESTHESIA:  General with a preoperative supraclavicular block.    OPERATIVE TECHNIQUE:  Preoperative Ancef and TXA had been given.  The patient was on the table with May headrest.  The right arm was off the side of the table.  The patient was fastened to the table to ensure his stability.  A Trimano arm shah had been placed.  A sterile prep and drape was performed and the arm was in the Trimano.  A deltopectoral approach was used and the interval between the deltoid and the pectoralis was developed.  The Kolbel retractor was placed between the conjoined tendon and the deltoid.  The biceps was already ruptured.  The interval was opened up.  There was still a little bit of the supraspinatus anteriorly.  The interval was opened to the glenoid.  A subscapularis tenotomy was performed with the Bovie.  The arm was in adduction and external rotation for this part.  Care was taken to stay away from the axillary nerve which had been palpated and was protected throughout the case.  The circumflex vessels had been coagulated.  A tag suture was placed through the subscapularis.  The capsule was released as far posterior as possible.  The humerus was delivered into the wound.  A cut was made using the guide system for the Plan[employer] NET total joint system at 30 degrees of version.  After the head cut had been made, the humerus template was  chosen for the large.  Drill was passed through the center portion and the reamer for the Blazer was performed and Blazer impacted in position.  The guide pin was removed.  The humerus was depressed posteriorly and a circumferential labral excision was performed.  Note that Ms. Liao was instrumental in exposure throughout this, especially this portion of the case to allow me to be able to perform the labral resection and get better exposure for the glenoid.  The small template for the baseplate was chosen and the pin placed.  The reamer was passed over the top of this and the baseplate impacted after removing the pin.  The center screw was placed first followed by the peripheral screws.  The reamers were passed around to ensure there was no bony prominence that would affect the glenosphere.  The 36 +4 glenosphere was impacted in position and then tightened down with the center screw.  The humerus was again delivered up into the wound and the Blazer was removed.  The large standard humeral implant was impacted.  The trial 0 baseplate and the 36 +0 poly was trialed and felt to be stable.  There was some impaction posteriorly but this did not affect any of the stability or mobility.  Once the trial was removed, the area where the bone was now prominent after the posterior impaction was rongeured to prevent any potential levering.  Copious irrigation was performed and the baseplate was impacted followed by the 36 +0 poly.  This was reduced and again put through range of motion and was felt to have excellent motion and stability.  The copious irrigation with pulse lavage and with the Irrisept was performed and the subscapularis tenotomy was repaired with #2 FiberWire.  Note that the superior half of the tendon was somewhat tenuous.  The skin was closed with 2-0 Vicryl and 4-0 Monocryl in subcuticular fashion.  Mastisol and Steri-Strips were used followed by the Aquacel dressing.  A bulky dressing placed over this.   The patient was placed in a sling and taken to the postanesthesia recovery room in stable condition.  There were no complications.    Dictated By Phoenix Murguia M.D.  d: 05/22/2025 15:33:09  t: 05/22/2025 15:48:08  [facility] [id number]  RLW/

## 2025-05-22 NOTE — INTERVAL H&P NOTE
Pre-op Diagnosis: RIGHT ROTATOR CUFF TEAR ARTHROPLASTY    The above referenced H&P was reviewed by Phoenix Murguia MD on 5/22/2025, the patient was examined and no significant changes have occurred in the patient's condition since the H&P was performed.  I discussed with the patient and/or legal representative the potential benefits, risks and side effects of this procedure; the likelihood of the patient achieving goals; and potential problems that might occur during recuperation.  I discussed reasonable alternatives to the procedure, including risks, benefits and side effects related to the alternatives and risks related to not receiving this procedure.  We will proceed with procedure as planned.

## 2025-05-22 NOTE — ANESTHESIA PROCEDURE NOTES
Airway  Date/Time: 5/22/2025 12:49 PM  Reason: elective    Airway not difficult    General Information and Staff   Patient location during procedure: OR  Anesthesiologist: Preethi Castano MD  Performed: anesthesiologist   Performed by: Preethi Castano MD  Authorized by: Preethi Castano MD        Indications and Patient Condition  Indications for airway management: anesthesia  Sedation level: deep      Preoxygenated: yesPatient position: sniffing    Mask difficulty assessment: 2 - vent by mask + OA or adjuvant +/- NMBA    Final Airway Details    Final airway type: endotracheal airway    Successful airway: ETT  Cuffed: yes   Successful intubation technique: direct laryngoscopy  Facilitating devices/methods: intubating stylet  Endotracheal tube insertion site: oral  Blade: Owen  Blade size: #4  ETT size (mm): 7.5    Cormack-Lehane Classification: grade IIA - partial view of glottis  Placement verified by: capnometry   Cuff volume (mL): 6  Measured from: lips  ETT to lips (cm): 21  Number of attempts at approach: 1  Number of other approaches attempted: 0

## 2025-05-22 NOTE — BRIEF OP NOTE
Pre-Operative Diagnosis: RIGHT ROTATOR CUFF TEAR ARTHROPLASTY     Post-Operative Diagnosis: RIGHT ROTATOR CUFF TEAR ARTHROPLASTY      Procedure Performed:   RIGHT REVERSE TOTAL SHOULDER ARTHROPLASTY    Surgeons and Role:     * Phoenix Murguia MD - Primary    Assistant(s):  PA: Anahi Liao PA-C     Surgical Findings:       Specimen: none     Estimated Blood Loss: Blood Output: 25 mL (5/22/2025  2:45 PM)      Dictation Number:       Phoenix Murguia MD  5/22/2025  3:22 PM           day(s)

## 2025-05-22 NOTE — ANESTHESIA PREPROCEDURE EVALUATION
PRE-OP EVALUATION    Patient Name: Gigi Salazar    Admit Diagnosis: RIGHT ROTATOR CUFF TEAR ARTHROPLASTY    Pre-op Diagnosis: RIGHT ROTATOR CUFF TEAR ARTHROPLASTY    RIGHT REVERSE TOTAL SHOULDER ARTHROPLASTY    Anesthesia Procedure: RIGHT REVERSE TOTAL SHOULDER ARTHROPLASTY (Right)    Surgeons and Role:     * Phoenix Murguia MD - Primary    Pre-op vitals reviewed.  Temp: 97.3 °F (36.3 °C)  Pulse: 65  Resp: 16  BP: 155/83  SpO2: 98 %  Body mass index is 30.9 kg/m².    Current medications reviewed.  Hospital Medications:  Current Medications[1]    Outpatient Medications:   Prescriptions Prior to Admission[2]    Allergies: Naproxen      Anesthesia Evaluation    Patient summary reviewed.    Anesthetic Complications  (-) history of anesthetic complications         GI/Hepatic/Renal    Negative GI/hepatic/renal ROS.                             Cardiovascular  Comment: TTE 05/25:  Summary:     1. Left ventricle: The cavity size is normal. Wall thickness is increased.       There is mild concentric hypertrophy. Systolic function is normal by       visual assessment. The estimated ejection fraction is 60-65%. Wall       motion is normal; there are no regional wall motion abnormalities.       Cannot assess LV diastolic function.   2.  Aortic valve: There is trace regurgitation.   3.  Systemic arteries: The ascending aorta A-P end-diastolic diameter is       4.0cm.   4.  Ascending aorta: The vessel is mildly dilated.   5.  Mitral valve: The annulus is mildly to moderately calcified. There is       mild regurgitation.   6.  Left atrium: The atrium is moderately to severely dilated.   7.  Right ventricle: The cavity size is normal. Wall thickness is normal.       Systolic function is normal by visual assessment. Estimation of the       right ventricular systolic pressure is within the normal range. The RV       pressure during systole is 34mm Hg.   8.  Right atrium: The atrium is moderately dilated.   9.  Tricuspid  valve: There is moderate regurgitation.   10. Pericardium, extracardiac: There is no significant pericardial effusion.   Impressions:  No previous study was available for comparison.     Prepared and signed by   Shivam Winters MD   05/08/2025 11:58         Exercise tolerance: good     MET: >4    (+) obesity  (+) hypertension   (+) hyperlipidemia  (+) CAD    (+) CABG/stent                            Endo/Other    Negative endo/other ROS.                              Pulmonary                    (+) sleep apnea       Neuro/Psych    Negative neuro/psych ROS.                                  Past Surgical History[3]  Social Hx on file[4]  History   Drug Use No     Available pre-op labs reviewed.  Lab Results   Component Value Date    WBC 6.8 05/12/2025    RBC 3.87 05/12/2025    HGB 12.4 (L) 05/12/2025    HCT 37.6 (L) 05/12/2025    MCV 97.2 05/12/2025    MCH 32.0 05/12/2025    MCHC 33.0 05/12/2025    RDW 12.9 05/12/2025    .0 05/12/2025     Lab Results   Component Value Date     05/12/2025    K 4.6 05/12/2025     05/12/2025    CO2 26.0 05/12/2025    BUN 15 05/12/2025    CREATSERUM 1.10 05/12/2025     (H) 05/12/2025    CA 9.6 05/12/2025            Airway      Mallampati: II  Mouth opening: >3 FB  TM distance: > 6 cm  Neck ROM: full Cardiovascular    Cardiovascular exam normal.         Dental    Dentition appears grossly intact         Pulmonary    Pulmonary exam normal.                 Other findings              ASA: 3   Plan: general  NPO status verified and patient meets guidelines.  Patient has taken beta blockers in last 24 hours.  Post-procedure pain management plan discussed with surgeon and patient.  Surgeon requests: regional block  Comment: I spoke with the patient and discussed the risks of general anesthesia, which include allergic reaction, nausea, vomiting, dental injury, sore throat, awareness, hypotension, as well as more serious cardiac and pulmonary complications. The patient  understands and consents to receiving general anesthesia for this procedure.     I also explained option and benefit of brachial plexus block including significant pain relief following the surgical procedure, decreased need for postoperative opioid use. Realistic expectation for block duration was discussed as well. I also explained possible downsides of peripheral nerve blocks including failed block, prolonged nerve blockade leading to prolonged numbness in upper extremity and hand, with possible muscle weakness necessitating arm sling, phrenic nerve involvement, temporary facial droopiness, and hematoma formation. Other exceedingly rare complications such as local anesthetic systemic toxicity (LAST), infection, and permanent nerve damage was also discussed. All questions were answered and patient demonstrated understanding of realistic expectations and risks of peripheral nerve blocks, and wishes to proceed with the procedure. Sites of block were marked by me with patient confirmation.    Plan/risks discussed with: patient                Present on Admission:  **None**             [1]    [Transfer Hold] acetaminophen (Tylenol Extra Strength) tab 1,000 mg  1,000 mg Oral Once    lactated ringers infusion   Intravenous Continuous    [Transfer Hold] tranexamic acid in sodium chloride 0.7% (Cyklokapron) 1000 mg/100mL infusion premix 1,000 mg  1,000 mg Intravenous Once    ceFAZolin (Ancef) 2g in 10mL IV syringe premix  2 g Intravenous Once   [2]   Medications Prior to Admission   Medication Sig Dispense Refill Last Dose/Taking    apixaban 5 MG Oral Tab Take 1 tablet (5 mg total) by mouth 2 (two) times daily.   5/19/2025    gabapentin 100 MG Oral Cap Take 1 capsule (100 mg total) by mouth nightly. TAKE AT BEDTIME   5/21/2025 at  8:00 PM    atorvastatin 80 MG Oral Tab Take 1 tablet (80 mg total) by mouth nightly.   5/21/2025 at  7:00 PM    alfuzosin ER 10 MG Oral Tablet 24 Hr Take 1 tablet (10 mg total) by mouth daily.    5/21/2025 at  7:00 PM    losartan 25 MG Oral Tab Take 1 tablet (25 mg total) by mouth in the morning.   5/15/2025    DULoxetine 20 MG Oral Cap DR Particles Take 1 capsule (20 mg total) by mouth daily.   5/22/2025 at  8:00 AM    METOPROLOL SUCCINATE 25 MG Oral Tablet 24 Hr TAKE 1 TABLET EVERY DAY 90 tablet 3 5/22/2025 at  8:00 AM    AMLODIPINE 5 MG Oral Tab TAKE 1 TABLET EVERY DAY 90 tablet 3 5/22/2025 at  8:00 AM    aspirin 81 MG Oral Tab Take 1 tablet (81 mg total) by mouth in the morning.   5/15/2025    Calcium Carbonate-Vitamin D (CALCIUM + D OR) Take 1,500 mg by mouth in the morning and 1,500 mg before bedtime.   5/21/2025 at  8:00 AM    Multiple Vitamins-Minerals (MULTIVITAMIN OR) Take 1 tablet by mouth daily.   5/21/2025 at  8:00 AM   [3]   Past Surgical History:  Procedure Laterality Date    Angioplasty (coronary)      x3    Bypass surgery  1997    Triple     Cath cartotid stent      x4    Fluor gid & loclzj ndl/cath spi dx/ther njx  4/28/2014    Procedure: LUMBAR / TRANSFORAMINAL EPIDURAL STEROID INJECTION;  Surgeon: Jason Pineda DO;  Location: [facility]    Inj for sacroiliac jt anesth Right 9/9/2014    Procedure: SACROILIAC JOINT INJECTION;  Surgeon: Jason Pineda DO;  Location: [facility]    Inj paravert f jnt c/t 1 lev Bilateral 7/15/2014    Procedure: FACET INJECTION UNDER FLUOROSCOPY;  Surgeon: Jason Pineda DO;  Location: [facility]    Inj paravert f jnt c/t 1 lev Bilateral 7/15/2014    Procedure: FACET INJECTION UNDER FLUOROSCOPY;  Surgeon: Jason Pineda DO;  Location: [facility]    Inj paravert f jnt c/t 2 lev Bilateral 7/15/2014    Procedure: FACET INJECTION UNDER FLUOROSCOPY;  Surgeon: Jason Pineda DO;  Location: [facility]    Inj paravert f jnt c/t 2 lev Bilateral 7/15/2014    Procedure: FACET INJECTION UNDER FLUOROSCOPY;  Surgeon: Jason Pineda DO;  Location: [facility]    Injection,  w/wo contrast, dx/therapeutic substance, epidural/subarachnoid; lumbar/sacral  4/28/2014    Procedure: LUMBAR / TRANSFORAMINAL EPIDURAL STEROID INJECTION;  Surgeon: Jason Pineda DO;  Location: [facility]    Other surgical history      right wrist    Other surgical history      right elbow    Other surgical history      appy    Other surgical history      tonsils    Other surgical history      8 angio grams    Other surgical history  12/30/16    Cystoscopy- Dr. Mora     Other surgical history  09/08/2017, 3/30/18    Cysto w/ Dr. Mora    Patient documented not to have experienced any of the following events  4/28/2014    Procedure: VAGAL NERVE STIMULATOR INSERTION;  Surgeon: Jason Pineda DO;  Location: [facility]    Patient documented not to have experienced any of the following events Bilateral 7/15/2014    Procedure: FACET INJECTION UNDER FLUOROSCOPY;  Surgeon: Jason Pineda DO;  Location: [facility]    Patient documented not to have experienced any of the following events Right 9/9/2014    Procedure: SACROILIAC JOINT INJECTION;  Surgeon: Jason Pineda DO;  Location: [facility]    Patient withough preoperative order for iv antibiotic surgical site infection prophylaxis.  4/28/2014    Procedure: LUMBAR / TRANSFORAMINAL EPIDURAL STEROID INJECTION;  Surgeon: Jason Pineda DO;  Location: [facility]    Patient withough preoperative order for iv antibiotic surgical site infection prophylaxis. Bilateral 7/15/2014    Procedure: FACET INJECTION UNDER FLUOROSCOPY;  Surgeon: Jason Pineda DO;  Location: [facility]    Patient withough preoperative order for iv antibiotic surgical site infection prophylaxis. Right 9/9/2014    Procedure: SACROILIAC JOINT INJECTION;  Surgeon: Jason Pineda DO;  Location: [facility]    Repair rotator cuff,acute     [4]   Social History  Socioeconomic History     Marital status:    Tobacco Use    Smoking status: Former     Current packs/day: 0.00     Average packs/day: 1.5 packs/day for 28.0 years (42.0 ttl pk-yrs)     Types: Cigarettes     Start date: 1960     Quit date: 1988     Years since quittin.8    Smokeless tobacco: Never   Vaping Use    Vaping status: Never Used   Substance and Sexual Activity    Alcohol use: Yes     Comment: daily x 1-2 drinks    Drug use: No   Other Topics Concern    Caffeine Concern Yes     Comment: 2 cups a day    Exercise Yes     Comment: Stretches

## 2025-05-22 NOTE — ANESTHESIA POSTPROCEDURE EVALUATION
[facility]    Gigi Salazar Patient Status:  Outpatient in a Bed   Age/Gender 89 year old male MRN [id number]   Location [facility] POST ANESTHESIA CARE UNIT Attending Phoenix Murguia MD   Hosp Day # 0 PCP WILLIAM HULESCH       Anesthesia Post-op Note    RIGHT REVERSE TOTAL SHOULDER ARTHROPLASTY    Procedure Summary       Date: 05/22/25 Room / Location:  MAIN OR 12 /  MAIN OR    Anesthesia Start: 1229 Anesthesia Stop: 1508    Procedure: RIGHT REVERSE TOTAL SHOULDER ARTHROPLASTY (Right: Shoulder) Diagnosis: (RIGHT ROTATOR CUFF TEAR ARTHROPLASTY)    Surgeons: Phoenix Murguia MD Anesthesiologist: Preethi Castano MD    Anesthesia Type: general ASA Status: 3            Anesthesia Type: general    Vitals Value Taken Time   /60 05/22/25 15:35   Temp 97.8 °F (36.6 °C) 05/22/25 15:05   Pulse 58 05/22/25 15:37   Resp 17 05/22/25 15:37   SpO2 94 % 05/22/25 15:37   Vitals shown include unfiled device data.        Patient Location: PACU    Anesthesia Type: general    Airway Patency: patent and extubated    Postop Pain Control: adequate    Mental Status: mildly sedated but able to meaningfully participate in the post-anesthesia evaluation    Nausea/Vomiting: none    Cardiopulmonary/Hydration status: stable euvolemic    Complications: no apparent anesthesia related complications    Postop vital signs: stable    Dental Exam: Unchanged from Preop    Patient to be discharged from PACU when criteria met.

## 2025-05-22 NOTE — ANESTHESIA PROCEDURE NOTES
Regional Block    Date/Time: 5/22/2025 12:35 PM    Performed by: Preethi Castano MD  Authorized by: Preethi Castano MD      General Information and Staff    Start Time:  5/22/2025 12:35 PM  End Time:  5/22/2025 12:43 PM  Anesthesiologist:  Preethi Castano MD  Performed by:  Anesthesiologist  Patient Location:  OR    Block Placement: Pre Induction  Site Identification: real time ultrasound guided and image stored and retrievable    Block site/laterality marked before start: site marked  Reason for Block: at surgeon's request and post-op pain management    Preanesthetic Checklist: 2 patient identifers, IV checked, risks and benefits discussed, monitors and equipment checked, pre-op evaluation, timeout performed, anesthesia consent, sterile technique used, no prohibitive neurological deficits and no local skin infection at insertion site      Procedure Details    Patient Position:  Sitting  Prep: ChloraPrep    Monitoring:  Cardiac monitor, continuous pulse ox, blood pressure cuff and heart rate  Block Type:  Interscalene  Laterality:  Right  Injection Technique:  Single-shot    Needle    Needle Type:  Short-bevel and echogenic  Needle Gauge:  22 G  Needle Length:  50 mm  Needle Localization:  Ultrasound guidance  Reason for Ultrasound Use: appropriate spread of the medication was noted in real time and no ultrasound evidence of intravascular and/or intraneural injection            Assessment    Injection Assessment:  Good spread noted, negative resistance, negative aspiration for heme, incremental injection, low pressure, local visualized surrounding nerve on ultrasound and no pain on injection  Paresthesia Pain:  None  Heart Rate Change: No    - Patient tolerated block procedure well without evidence of immediate block related complications.     Medications  5/22/2025 12:35 PM      Additional Comments    Medication:  Ropivacaine 0.5% 20mL

## 2025-05-23 VITALS
RESPIRATION RATE: 18 BRPM | WEIGHT: 180 LBS | HEART RATE: 53 BPM | OXYGEN SATURATION: 93 % | SYSTOLIC BLOOD PRESSURE: 97 MMHG | TEMPERATURE: 98 F | HEIGHT: 64 IN | DIASTOLIC BLOOD PRESSURE: 64 MMHG | BODY MASS INDEX: 30.73 KG/M2

## 2025-05-23 PROCEDURE — 97535 SELF CARE MNGMENT TRAINING: CPT

## 2025-05-23 PROCEDURE — 97165 OT EVAL LOW COMPLEX 30 MIN: CPT

## 2025-05-23 PROCEDURE — 94760 N-INVAS EAR/PLS OXIMETRY 1: CPT

## 2025-05-23 RX ORDER — HYDROCODONE BITARTRATE AND ACETAMINOPHEN 10; 325 MG/1; MG/1
1 TABLET ORAL EVERY 6 HOURS PRN
Qty: 30 TABLET | Refills: 0 | Status: SHIPPED | OUTPATIENT
Start: 2025-05-23 | End: 2025-06-02

## 2025-05-23 NOTE — PROGRESS NOTES
NURSING DISCHARGE NOTE    Discharged Home via Ambulatory.  Accompanied by Family member  Belongings Taken by patient/family.  Patient discharged home. Iv access removed along telemetry monitor. Instructions provided to patient and family.Reviewed diet, activity, follow up appointments and medications on discharge .patietn aware of resuming Eliquis in three days. Patient walked out safely. No acute distress present upon discharge.

## 2025-05-23 NOTE — PROGRESS NOTES
Postop day #1 status post right reverse total shoulder arthroplasty.  He notes his block is worn off.  He has taken 1 oxycodone and is comfortable.    /68 (BP Location: Left arm)   Pulse 57   Temp 98.3 °F (36.8 °C) (Oral)   Resp 16   Ht 5' 4\" (1.626 m)   Wt 180 lb (81.6 kg)   SpO2 96%   BMI 30.90 kg/m²     Aquacel dressing is clean dry and intact.  He has intact sensation at the axillary badge.  Radial median and ulnar motor and sensory intact.       X-rays showed excellent position of the reverse total shoulder arthroplasty on the right with no periprosthetic fractures or loosening    Postop day #1 status post right reverse total shoulder arthroplasty.  He will attend a session of therapy today and then would be suitable for discharge with follow-up in 1 week.  He is able to get the Aquacel dressing wet.  He will remove this in 7 days and then he can get the wound itself wet.  He can resume his Eliquis in 3 days

## 2025-05-23 NOTE — OCCUPATIONAL THERAPY NOTE
OCCUPATIONAL THERAPY EVALUATION - INPATIENT    Room Number: 0017/0017-A   Evaluation Date: 5/23/2025   Type of Evaluation: Initial  Presenting Problem: 5/22/25: RIGHT REVERSE TOTAL SHOULDER ARTHROPLASTY    Physician Order: IP Consult to Occupational Therapy  Reason for Therapy:  ADL/IADL Dysfunction and Discharge Planning      Per Dr. Murguia instructions:  Sling Instructions  · Must be in the sling at all times, when sleeping and when up and walking  · Arm in sling with hand level with elbow, along with the wrist and hand inside the sling.  · DO NOT rotate your arm out to the side.  · you may use a pillow under the elbow while sitting in a chair for comfort and support, no weight or leaning on surgical arm    OCCUPATIONAL THERAPY ASSESSMENT   Patient is a 89 year old female admitted on 5/22/2025  with Presenting Problem: 5/22/25: RIGHT REVERSE TOTAL SHOULDER ARTHROPLASTY. Co-Morbidities : h/o bladder cancer, spinal stenosis, sp CABG x 3, h/o Right rotator cuff tendonitis, Afib, Left rotator cuff tear, left wrist fx, HTN  Patient is currently functioning near baseline with self-care and functional mobility.  Prior to admission, patient's baseline is independent level.  Patient met all OT goals at mod I to SBA level.  Patient reports no further questions/concerns at this time. Patient has good support and set-up at home.    Patient will be discharged from OT services at this time.  Will benefit from return to home setting at discharge from hospital.      WEIGHT BEARING RESTRICTION  Weight Bearing Restriction: None, R upper extremity  R Upper Extremity: Non-Weight Bearing             Recommendations for nursing staff:   Transfers: one person, no device  Toileting location: Toilet    EVALUATION SESSION:  Patient at start of session: chair  FUNCTIONAL TRANSFER ASSESSMENT  Sit to Stand: Chair; Edge of Bed  Edge of Bed: Modified Independent  Chair: Modified Independent  Toilet Transfer: Modified Independent      BED  MOBILITY  Rolling: Independent  Supine to Sit : Modified Independent  Sit to Supine (OT): Modified Independent  Scooting: Mod I      BALANCE ASSESSMENT  Static Sitting: Modified Independent  Static Standing: Modified Independent      FUNCTIONAL ADL ASSESSMENT  Eating: Independent  Grooming Seated: Modified Independent  UB Dressing Seated: Stand-by Assist  UB Dressing Standing: Stand-by Assist (after instruction and increased time to complete don/doffing of sling and pull on loose sweater)  LB Dressing Seated: Stand-by Assist  Toileting Seated: Modified Independent        ACTIVITY TOLERANCE:   Pulse: 53  Heart Rate Source: Monitor                   O2 SATURATIONS       COGNITION  Overall Cognitive Status:  WFL - within functional limits  COGNITION ASSESSMENTS       Upper Extremity:   ROM: within functional limits   Strength: is within functional limits   Coordination:  Gross motor: WFL  Fine motor: WFL  Sensation: Light touch:  intact; no c/o numbness or tingling    Vision: no acute changes reported  Able to read clock on wall without difficulty    EDUCATION PROVIDED  Patient Education : Role of Occupational Therapy; Plan of Care; Discharge Recommendations; DME Recommendations; Functional Transfer Techniques; Fall Prevention; Weight Bear Status; Surgical Precautions; Posture/Positioning; Edema Reduction; Energy Conservation; Proper Body Mechanics; Other (sling management and precuations)  Patient's Response to Education: Verbalized Understanding      Therapist comments: OT educated patient on safety,  sequencing, energy conservation, pain management, home modifications and adaptive equipment to increase independence with ADLs.    Patient instructed in donning/doffing sling  Instructed in completion of Right UE AROM to hand, wrist,and elbow; pendulum exercises in standing.  Patient completed at independent level.  Patient required increased time and SBA to manage brief and pant on right side to avoid ER of right  shoulder.  Patient demonstrates good understanding regarding precautions, restrictions and adheres independently.  Patient plans on having his wife assist prn during recovery period.    Patient End of Session: Up in chair, With  staff, Needs met, Call light within reach, RN aware of session/findings, All patient questions and concerns addressed, Alarm set, Discussed recommendations with /    OCCUPATIONAL PROFILE    HOME SITUATION  Type of Home: House  Home Layout: Two level  Lives With: Spouse    Toilet and Equipment: Standard height toilet  Shower/Tub and Equipment: Walk-in shower  Other Equipment: None       Hand Dominance: Right  Drives: Yes  Patient Regularly Uses: Reading glasses    Prior Level of Function: Mod I with ADLs and functional mobility with no device.  No falls reported in the past 6 months    SUBJECTIVE  Patient reports looking forward to going myrtle diving and zip lining.  PAIN ASSESSMENT  Rating: Unable to rate  Location: right shoulder  Management Techniques: Body mechanics, Relaxation, Repositioning, Nurse notified, Ice    OBJECTIVE  Precautions: Limb alert - right (sling)  Fall Risk: Standard fall risk    WEIGHT BEARING RESTRICTION  Weight Bearing Restriction: None, R upper extremity  R Upper Extremity: Non-Weight Bearing             AM-PAC ‘6-Clicks’ Inpatient Daily Activity Short Form  -   Putting on and taking off regular lower body clothing?: A Little  -   Bathing (including washing, rinsing, drying)?: A Little  -   Toileting, which includes using toilet, bedpan or urinal? : A Little  -   Putting on and taking off regular upper body clothing?: A Little  -   Taking care of personal grooming such as brushing teeth?: None  -   Eating meals?: None    AM-PAC Score:  Score: 20  Approx Degree of Impairment: 38.32%  Standardized Score (AM-PAC Scale): 42.03      ADDITIONAL TESTS     NEUROLOGICAL FINDINGS        PLAN   Patient has been evaluated and presents with no skilled  Occupational Therapy needs at this time.  Patient discharged from Occupational Therapy services.  Please re-order if a new functional limitation presents during this admission.      Patient Evaluation Complexity Level:   Occupational Profile/Medical History LOW - Brief history including review of medical or therapy records    Specific performance deficits impacting engagement in ADL/IADL LOW  1 - 3 performance deficits    Client Assessment/Performance Deficits LOW - No comorbidities nor modifications of tasks    Clinical Decision Making LOW - Analysis of occupational profile, problem-focused assessments, limited treatment options    Overall Complexity LOW     OT Session Time: 30 minutes  Self-Care Home Management: 15 minutes

## 2025-05-23 NOTE — PLAN OF CARE
NURSING ADMISSION NOTE      Patient admitted via Cart  Oriented to room.  Safety precautions initiated.  Bed in low position.  Call light in reach.    Received patient post op.  VSS, awake a/ox4.  Patient denies pain.  Family at bedside.

## 2025-05-23 NOTE — CONSULTS
[facility] H&P/Consult note       CC: post op med management  Asked to see pt by DR Murguia       PCP: WILLIAM HULESCH    History of Present Illness: Patient is a 89 year old male with PMH sig for CAD, AFL, HTN, PAD, here for scheduled shoulder replacement  Pain controlled post op. No nv/. No cp    PMH  Past Medical History[1]     PSH  Past Surgical History[2]     ALL:  Allergies[3]     Home Medications:  Medications Taking[4]      Soc Hx  Social History     Tobacco Use    Smoking status: Former     Current packs/day: 0.00     Average packs/day: 1.5 packs/day for 28.0 years (42.0 ttl pk-yrs)     Types: Cigarettes     Start date: 1960     Quit date: 1988     Years since quittin.8    Smokeless tobacco: Never   Substance Use Topics    Alcohol use: Yes     Comment: daily x 1-2 drinks        Fam Hx  Family History[5]    Review of Systems  Comprehensive ROS reviewed and negative except for what's stated above.         OBJECTIVE:  /68 (BP Location: Left arm)   Pulse 57   Temp 98.3 °F (36.8 °C) (Oral)   Resp 16   Ht 5' 4\" (1.626 m)   Wt 180 lb (81.6 kg)   SpO2 96%   BMI 30.90 kg/m²   General:  Alert, no distress, appears stated age.   Head:  Normocephalic,    Eyes:  Sclera anicteric .    Throat: MMM   Neck: Supple,    Lungs:   Clear to auscultation bilaterally    Chest wall:  No tenderness or deformity.   Heart:  Regular rate and rhythm,    Abdomen:   Soft, NT/ND    Extremities: Atraumatic,  R arm in sling    Skin: No visible rashes or lesions.           Diagnostic Data:    CBC/Chem  No results for input(s): \"WBC\", \"HGB\", \"MCV\", \"PLT\", \"BAND\", \"INR\" in the last 168 hours.    Invalid input(s): \"LYM#\", \"MONO#\", \"BASOS#\", \"EOSIN#\"    No results for input(s): \"NA\", \"K\", \"CL\", \"CO2\", \"BUN\", \"CREATSERUM\", \"GLU\", \"CA\", \"CAION\", \"MG\", \"PHOS\" in the last 168 hours.    No results for input(s): \"ALT\", \"AST\", \"ALB\", \"AMYLASE\", \"LIPASE\", \"LDH\" in the last 168 hours.    Invalid input(s): \"ALPHOS\", \"TBIL\",  \"DBIL\", \"TPROT\"    No results for input(s): \"TROP\" in the last 168 hours.       Radiology: XR SHOULDER, COMPLETE (MIN 2 VIEWS), RIGHT (CPT=73030)  Result Date: 5/22/2025  PROCEDURE:  XR SHOULDER, COMPLETE (MIN 2 VIEWS), RIGHT (CPT=73030)  TECHNIQUE:  Multiple views were obtained.  COMPARISON:  None.  INDICATIONS:  Right shoulder arthroplasty  PATIENT STATED HISTORY: (As transcribed by Technologist)  Reverse TSA right shoulder    FINDINGS:  Reverse right shoulder arthroplasty with anatomic alignment.  No fracture dislocation.  No lytic or blastic lesion.  Gas within the soft tissues related to recent surgery.            CONCLUSION:  Status post right reverse arthroplasty with anatomic alignment.   LOCATION:  [facility]   Dictated by (CST): Nazario Montana MD on 5/22/2025 at 5:03 PM     Finalized by (CST): Nazario Montana MD on 5/22/2025 at 5:04 PM          ASSESSMENT / PLAN:     Patient is a 89 year old male with PMH sig for CAD, AFL, HTN, PAD, here for scheduled shoulder replacement    Impression    -sp reverse total right shoulder arthroplasty 05/22/25  -post op pain    -CAD sp remote CABG, most recently PCI in 2023  -coronary artery aneurysm  -AFL / PAF - recently found pre op, on AC  -HTN  -PAD / AAA  -obesity bmi 30    Plan    -post op surgical management per ortho  Pain control    -resume home meds as able  -bp controlled, cont home bp meds    Currently aspirin / eliquis held, resume when ok with ortho     scds          Further recommendations pending patient's clinical course. [facility] hospitalist to continue to follow patient while in house    Patient and/or patient's family given opportunity to ask questions and note understanding and agreeing with therapeutic plan as outlined    Andi Barber Hospitalist  [phone]  Answering Service: [phone]           [1]   Past Medical History:   AAA (abdominal aortic aneurysm)    Arthritis    Atherosclerosis of native artery of extremity    CAD in native artery     Carotid artery stenosis, unspecified laterality    Closed Colles' fracture of right radius with malunion    Closed fracture of dorsal (thoracic) vertebra without mention of spinal cord injury    Log Date: 10/30/2012     DJD (degenerative joint disease), ankle and foot    Enthesopathy of hip region    Hearing impaired person, bilateral    bilateral hearing aids    Heart disease    High blood pressure    High cholesterol    History of chest pain    History of coronary artery stent placement    Hypercholesterolemia    Hyperlipidemia    Hypertension    Hypertension, benign    Osteoarthritis    Pain in thoracic spine    Log Date: 11/27/2012     Pulmonary embolism (HCC)    Reflux    S/P CABG (coronary artery bypass graft)    Stress fracture of the metatarsals    Ulcer    Unspecified sleep apnea    CPAP   AHI 28 [address] 84%    Visual impairment    reading glasses    Wrist pain, right   [2]   Past Surgical History:  Procedure Laterality Date    Angioplasty (coronary)      x3    Bypass surgery  1997    Triple     Cath cartotid stent      x4    Fluor gid & loclzj ndl/cath spi dx/ther njx  4/28/2014    Procedure: LUMBAR / TRANSFORAMINAL EPIDURAL STEROID INJECTION;  Surgeon: Jason Pineda DO;  Location: [facility]    Inj for sacroiliac jt anesth Right 9/9/2014    Procedure: SACROILIAC JOINT INJECTION;  Surgeon: Jason Pineda DO;  Location: [facility]    Inj paravert f jnt c/t 1 lev Bilateral 7/15/2014    Procedure: FACET INJECTION UNDER FLUOROSCOPY;  Surgeon: Jason Pineda DO;  Location: [facility]    Inj paravert f jnt c/t 1 lev Bilateral 7/15/2014    Procedure: FACET INJECTION UNDER FLUOROSCOPY;  Surgeon: Jason Pineda DO;  Location: [facility]    Inj paravert f jnt c/t 2 lev Bilateral 7/15/2014    Procedure: FACET INJECTION UNDER FLUOROSCOPY;  Surgeon: Jason Pineda DO;  Location: [facility]    Inj paravert f jnt c/t 2 lev Bilateral  7/15/2014    Procedure: FACET INJECTION UNDER FLUOROSCOPY;  Surgeon: Jason Pineda DO;  Location: [facility]    Injection, w/wo contrast, dx/therapeutic substance, epidural/subarachnoid; lumbar/sacral  4/28/2014    Procedure: LUMBAR / TRANSFORAMINAL EPIDURAL STEROID INJECTION;  Surgeon: Jason Pineda DO;  Location: [facility]    Other surgical history      right wrist    Other surgical history      right elbow    Other surgical history      appy    Other surgical history      tonsils    Other surgical history      8 angio grams    Other surgical history  12/30/16    Cystoscopy- Dr. Mora     Other surgical history  09/08/2017, 3/30/18    Cysto w/ Dr. Mora    Patient documented not to have experienced any of the following events  4/28/2014    Procedure: VAGAL NERVE STIMULATOR INSERTION;  Surgeon: Jason Pineda DO;  Location: [facility]    Patient documented not to have experienced any of the following events Bilateral 7/15/2014    Procedure: FACET INJECTION UNDER FLUOROSCOPY;  Surgeon: Jason Pineda DO;  Location: [facility]    Patient documented not to have experienced any of the following events Right 9/9/2014    Procedure: SACROILIAC JOINT INJECTION;  Surgeon: Jason Pineda DO;  Location: [facility]    Patient withough preoperative order for iv antibiotic surgical site infection prophylaxis.  4/28/2014    Procedure: LUMBAR / TRANSFORAMINAL EPIDURAL STEROID INJECTION;  Surgeon: Jason Pineda DO;  Location: [facility]    Patient withough preoperative order for iv antibiotic surgical site infection prophylaxis. Bilateral 7/15/2014    Procedure: FACET INJECTION UNDER FLUOROSCOPY;  Surgeon: Jason Pineda DO;  Location: [facility]    Patient withough preoperative order for iv antibiotic surgical site infection prophylaxis. Right 9/9/2014    Procedure: SACROILIAC JOINT INJECTION;  Surgeon:  Jason Pineda, DO;  Location: [facility]    Repair rotator cuff,acute     [3]   Allergies  Allergen Reactions    Naproxen RASH   [4]   Outpatient Medications Marked as Taking for the 5/22/25 encounter (Hospital Encounter)   Medication Sig Dispense Refill    apixaban 5 MG Oral Tab Take 1 tablet (5 mg total) by mouth 2 (two) times daily.      gabapentin 100 MG Oral Cap Take 1 capsule (100 mg total) by mouth nightly. TAKE AT BEDTIME      atorvastatin 80 MG Oral Tab Take 1 tablet (80 mg total) by mouth nightly.      alfuzosin ER 10 MG Oral Tablet 24 Hr Take 1 tablet (10 mg total) by mouth daily.      losartan 25 MG Oral Tab Take 1 tablet (25 mg total) by mouth in the morning.      DULoxetine 20 MG Oral Cap DR Particles Take 1 capsule (20 mg total) by mouth daily.      METOPROLOL SUCCINATE 25 MG Oral Tablet 24 Hr TAKE 1 TABLET EVERY DAY 90 tablet 3    AMLODIPINE 5 MG Oral Tab TAKE 1 TABLET EVERY DAY 90 tablet 3    aspirin 81 MG Oral Tab Take 1 tablet (81 mg total) by mouth in the morning.      Calcium Carbonate-Vitamin D (CALCIUM + D OR) Take 1,500 mg by mouth in the morning and 1,500 mg before bedtime.      Multiple Vitamins-Minerals (MULTIVITAMIN OR) Take 1 tablet by mouth daily.     [5]   Family History  Problem Relation Age of Onset    Heart Disorder Father     Other (Other) Father     Other (Other) Mother

## 2025-05-23 NOTE — DISCHARGE INSTRUCTIONS
Dr. TIFFANY Murguia:  Total Shoulder: discharge instructions    Activity  Bathing                                                                No tub baths until cleared by surgeon.  If allowed to shower:                                                               Ok to let water to run over the incision area      IF AQUACEL DRESSING                                         Leave in place. To be changed in 7 days   Then follow directions under Incision Care/Dressing changes                                                                                                                                                                                                             Driving  Do not drive until cleared by physician at follow-up office visit. This is usually four weeks after surgery.  Not allowed while taking narcotic pain medication or muscle relaxants.      Return to work  Usually allowed after four to six weeks. Discuss specific work activities with your surgeon.    Restrictions/Exercise  For shoulder replacement surgery, follow instructions provided by physical therapy and your surgeon.   Pendulum exercises    Sling Instructions  Must be in the sling at all times, when sleeping and when up and walking  Arm in sling with hand level with elbow, along with the wrist and hand inside the sling.  DO NOT rotate your arm out to the side.  you may use a pillow under the elbow while sitting in a chair for comfort and support, no weight or leaning on surgical arm    No smoking  Avoid smoking. Known to cause breathing problems and can decrease the rate of healing.     Discomfort  Surgical discomfort is normal for one to two months.  Take pain medication as prescribed with food, allowing 30-45 minutes to take effect.  Do not drink alcohol while on pain medication.  Keep pain manageable; pain should not disrupt your sleep or activities like getting out of bed or walking.  As you have less discomfort, decrease the amount of  pain medication you take. Use plain Tylenol for less severe pain.  Use ice therapy or polar care to surgical site for 20 minutes at least three times a day. Be sure there is a thin cloth barrier between skin and ice or cold therapy.  Change position at least every 45 minutes while awake to avoid stiffness or increased discomfort.  Wear a button up light weight shirt under the immobilizer to minimize risk of skin irritation  Have realistic goals and keep a positive outlook.  Deep breathing and relaxation techniques can help.  Contact physician if discomfort does not respond to pain medication      Incision care/Dressing changes  Wash hands before and after dressing changes.  If you have the Aquacel dressing, this is to stay on the incision until 7 days after surgery  This will be removed by your home health nurse or the nursing staff at the Rehabilitation [facility].         Then this will be changed to a DRY GAUZE DRESSINGS:    Change dressing daily using dry sterile gauze and paper tape. Continue this until you follow up with your surgeon.  Remove entire wrapping and old dressing (if gauze)  Change after showering.   Pat dry if necessary and cover incision with dry sterile gauze and paper tape. For other types of dressings, follow surgeon’s orders.                                                               There could be a small amount of redness around the staples or incision; this is normal.  Watch for increased redness, warmth, any odor, increased drainage or opening of the incision.  Call physician if you notice these changes.  Sutures/staples will be removed at first office visit                                                               (2 3 weeks).    Body changes  Constipation - common with the use of narcotics.  Use stool softeners such as Colace or Senokot while on narcotics, and laxatives if needed. Eat fiber rich foods and drink plenty of fluids, water is a choice liquid.  An enema may be needed if  above measures do not work    Prevention of infection and promotion of healing  Good hand washing is important, along with frequent hand washing.  Keep bed linen/clothing freshly laundered.  Do not allow others or pets to touch your incision.  Avoid people that have colds or the flu.  Your surgeon may recommend that you take antibiotics before you undergo any dental procedures after your joint replacement. Speak with your physician about this at your post-op office visit.  Eat a balanced diet high in fiber and drink plenty of fluids.   Continue using incentive spirometry  Check your temperature twice a day before taking pain medication, because there is Tylenol in most pain medicine, if as high as 100.4F, drink some extra fluids, use your incentive spirometer for deep breathing, get up and walk around frequently over next 2 hours, recheck your temperature.    Post op Office visits  Schedule 10-14 days after surgery, as directed by your surgeon  Follow up in 1 week with your Primary Care Physician  Additional visits may need to be scheduled. Your physician will discuss this at first post-op office visit.  Schedule outpatient physical therapy per your surgeon’s orders     Sex  Check with physician at your office visit.      Notify your surgeon if you notice any of the following signs  Separation of incision line.  Increased redness, swelling, or warmth of skin around incision.  Increased or foul smelling drainage from incision  Red streaks on skin near incision.  Temperature >100.4F, on 2 subsequent readings.  Increased pain at incision not relieved by pain medication.  Please contact Dr. TIFFANY Murguia before you come to the ER regarding the incisions appearance, contact number is [phone]  Your surgeon’s office is available 24 hours a day 7 days a week. The answering service will direct your call to your surgeon, the on call surgeon or PA (physician’s assistant). This person will be in contact with you.    Signs of  blood clot  Pain, excessive tenderness, redness, or swelling in leg or calf (other than incision site).  Call your surgeon  Increased pain in either calf with walking      Go directly to the ER or CALL 911 if you:  become short of breath  have chest pain  cough up blood  have unexplained anxiety with breathing  these could be signs of a blood clot in your lungs called a Pulmonary Embolism  Can resume Eliquis in 3 days    bj

## 2025-05-23 NOTE — PROGRESS NOTES
No change overnight. Q 4hour neuro assessment. Some numbness in right thumb which has been improving all night, and per patient \" almost back to normal\" up with standby assist to bathroom. Dressing intact. Iv antibiotics   5am :  pain this am right shoulder.oxycodone, and ice applied. No further numbess in fingers  Desating a little this am while sleeping, O2 3 liters

## 2025-06-10 RX ORDER — GABAPENTIN 100 MG/1
100 CAPSULE ORAL AT BEDTIME
Qty: 90 CAPSULE | Refills: 0 | Status: SHIPPED | OUTPATIENT
Start: 2025-06-10

## 2025-06-10 RX ORDER — GABAPENTIN 300 MG/1
CAPSULE ORAL
Qty: 90 CAPSULE | Refills: 0 | Status: SHIPPED | OUTPATIENT
Start: 2025-06-10

## 2025-07-15 ENCOUNTER — HOSPITAL ENCOUNTER (OUTPATIENT)
Age: 89
Setting detail: OBSERVATION
Discharge: HOME | End: 2025-07-16
Attending: STUDENT IN AN ORGANIZED HEALTH CARE EDUCATION/TRAINING PROGRAM | Admitting: FAMILY MEDICINE

## 2025-07-15 ENCOUNTER — APPOINTMENT (OUTPATIENT)
Dept: GENERAL RADIOLOGY | Age: 89
End: 2025-07-15
Attending: STUDENT IN AN ORGANIZED HEALTH CARE EDUCATION/TRAINING PROGRAM

## 2025-07-15 ENCOUNTER — APPOINTMENT (OUTPATIENT)
Dept: CT IMAGING | Age: 89
End: 2025-07-15
Attending: STUDENT IN AN ORGANIZED HEALTH CARE EDUCATION/TRAINING PROGRAM

## 2025-07-15 DIAGNOSIS — S22.42XA CLOSED FRACTURE OF MULTIPLE RIBS OF LEFT SIDE, INITIAL ENCOUNTER: Primary | ICD-10-CM

## 2025-07-15 PROBLEM — Z96.611 S/P REVERSE TOTAL SHOULDER ARTHROPLASTY, RIGHT: Status: ACTIVE | Noted: 2025-06-06

## 2025-07-15 LAB
ANION GAP SERPL CALC-SCNC: 9 MMOL/L (ref 7–19)
BASOPHILS # BLD: 0 K/MCL (ref 0–0.3)
BASOPHILS NFR BLD: 0 %
BUN SERPL-MCNC: 20 MG/DL (ref 6–20)
BUN/CREAT SERPL: 22 (ref 7–25)
CALCIUM SERPL-MCNC: 8.9 MG/DL (ref 8.4–10.2)
CHLORIDE SERPL-SCNC: 111 MMOL/L (ref 97–110)
CO2 SERPL-SCNC: 20 MMOL/L (ref 21–32)
CREAT SERPL-MCNC: 0.89 MG/DL (ref 0.67–1.17)
DEPRECATED RDW RBC: 45.4 FL (ref 39–50)
EGFRCR SERPLBLD CKD-EPI 2021: 82 ML/MIN/{1.73_M2}
EOSINOPHIL # BLD: 0.1 K/MCL (ref 0–0.5)
EOSINOPHIL NFR BLD: 1 %
ERYTHROCYTE [DISTWIDTH] IN BLOOD: 12.7 % (ref 11–15)
FASTING DURATION TIME PATIENT: ABNORMAL H
GLUCOSE SERPL-MCNC: 117 MG/DL (ref 70–99)
HCT VFR BLD CALC: 37.9 % (ref 39–51)
HGB BLD-MCNC: 12.2 G/DL (ref 13–17)
IMM GRANULOCYTES # BLD AUTO: 0 K/MCL (ref 0–0.2)
IMM GRANULOCYTES # BLD: 0 %
INR PPP: 2.1
LYMPHOCYTES # BLD: 1.6 K/MCL (ref 1–4)
LYMPHOCYTES NFR BLD: 17 %
MCH RBC QN AUTO: 31.2 PG (ref 26–34)
MCHC RBC AUTO-ENTMCNC: 32.2 G/DL (ref 32–36.5)
MCV RBC AUTO: 96.9 FL (ref 78–100)
MONOCYTES # BLD: 0.7 K/MCL (ref 0.3–0.9)
MONOCYTES NFR BLD: 8 %
NEUTROPHILS # BLD: 6.8 K/MCL (ref 1.8–7.7)
NEUTROPHILS NFR BLD: 74 %
NRBC BLD MANUAL-RTO: 0 /100 WBC
PLATELET # BLD AUTO: 207 K/MCL (ref 140–450)
POTASSIUM SERPL-SCNC: 4.2 MMOL/L (ref 3.4–5.1)
PROTHROMBIN TIME: 21.4 SEC (ref 9.7–11.8)
RBC # BLD: 3.91 MIL/MCL (ref 4.5–5.9)
SODIUM SERPL-SCNC: 136 MMOL/L (ref 135–145)
WBC # BLD: 9.3 K/MCL (ref 4.2–11)

## 2025-07-15 PROCEDURE — 96374 THER/PROPH/DIAG INJ IV PUSH: CPT

## 2025-07-15 PROCEDURE — A4216 STERILE WATER/SALINE, 10 ML: HCPCS | Performed by: INTERNAL MEDICINE

## 2025-07-15 PROCEDURE — A9150 MISC/EXPER NON-PRESCRIPT DRU: HCPCS | Performed by: INTERNAL MEDICINE

## 2025-07-15 PROCEDURE — 85025 COMPLETE CBC W/AUTO DIFF WBC: CPT | Performed by: STUDENT IN AN ORGANIZED HEALTH CARE EDUCATION/TRAINING PROGRAM

## 2025-07-15 PROCEDURE — 96376 TX/PRO/DX INJ SAME DRUG ADON: CPT

## 2025-07-15 PROCEDURE — 10004651 HB RX, NO CHARGE ITEM: Performed by: INTERNAL MEDICINE

## 2025-07-15 PROCEDURE — G0378 HOSPITAL OBSERVATION PER HR: HCPCS

## 2025-07-15 PROCEDURE — 10002803 HB RX 637: Performed by: INTERNAL MEDICINE

## 2025-07-15 PROCEDURE — 85610 PROTHROMBIN TIME: CPT | Performed by: STUDENT IN AN ORGANIZED HEALTH CARE EDUCATION/TRAINING PROGRAM

## 2025-07-15 PROCEDURE — 70450 CT HEAD/BRAIN W/O DYE: CPT

## 2025-07-15 PROCEDURE — 10002800 HB RX 250 W HCPCS: Performed by: STUDENT IN AN ORGANIZED HEALTH CARE EDUCATION/TRAINING PROGRAM

## 2025-07-15 PROCEDURE — 71101 X-RAY EXAM UNILAT RIBS/CHEST: CPT

## 2025-07-15 PROCEDURE — 99223 1ST HOSP IP/OBS HIGH 75: CPT | Performed by: INTERNAL MEDICINE

## 2025-07-15 PROCEDURE — 10002800 HB RX 250 W HCPCS: Performed by: INTERNAL MEDICINE

## 2025-07-15 PROCEDURE — 80048 BASIC METABOLIC PNL TOTAL CA: CPT | Performed by: STUDENT IN AN ORGANIZED HEALTH CARE EDUCATION/TRAINING PROGRAM

## 2025-07-15 PROCEDURE — 99285 EMERGENCY DEPT VISIT HI MDM: CPT | Performed by: STUDENT IN AN ORGANIZED HEALTH CARE EDUCATION/TRAINING PROGRAM

## 2025-07-15 RX ORDER — LOSARTAN POTASSIUM 25 MG/1
25 TABLET ORAL DAILY
Status: DISCONTINUED | OUTPATIENT
Start: 2025-07-16 | End: 2025-07-16 | Stop reason: HOSPADM

## 2025-07-15 RX ORDER — MAGNESIUM HYDROXIDE/ALUMINUM HYDROXICE/SIMETHICONE 120; 1200; 1200 MG/30ML; MG/30ML; MG/30ML
30 SUSPENSION ORAL EVERY 4 HOURS PRN
Status: DISCONTINUED | OUTPATIENT
Start: 2025-07-15 | End: 2025-07-16 | Stop reason: HOSPADM

## 2025-07-15 RX ORDER — ONDANSETRON 4 MG/1
4 TABLET, ORALLY DISINTEGRATING ORAL EVERY 12 HOURS PRN
Status: DISCONTINUED | OUTPATIENT
Start: 2025-07-15 | End: 2025-07-16 | Stop reason: HOSPADM

## 2025-07-15 RX ORDER — METOPROLOL SUCCINATE 25 MG/1
25 TABLET, EXTENDED RELEASE ORAL DAILY
Status: DISCONTINUED | OUTPATIENT
Start: 2025-07-16 | End: 2025-07-16 | Stop reason: HOSPADM

## 2025-07-15 RX ORDER — WARFARIN SODIUM 2.5 MG/1
2.5 TABLET ORAL ONCE
Status: COMPLETED | OUTPATIENT
Start: 2025-07-15 | End: 2025-07-15

## 2025-07-15 RX ORDER — 0.9 % SODIUM CHLORIDE 0.9 %
10 VIAL (ML) INJECTION PRN
Status: DISCONTINUED | OUTPATIENT
Start: 2025-07-15 | End: 2025-07-16 | Stop reason: HOSPADM

## 2025-07-15 RX ORDER — ASPIRIN 81 MG/1
81 TABLET ORAL EVERY EVENING
Status: DISCONTINUED | OUTPATIENT
Start: 2025-07-15 | End: 2025-07-16 | Stop reason: HOSPADM

## 2025-07-15 RX ORDER — LIDOCAINE 4 G/G
1 PATCH TOPICAL DAILY
Status: DISCONTINUED | OUTPATIENT
Start: 2025-07-15 | End: 2025-07-16 | Stop reason: HOSPADM

## 2025-07-15 RX ORDER — WARFARIN SODIUM 5 MG/1
5 TABLET ORAL NIGHTLY
COMMUNITY

## 2025-07-15 RX ORDER — 0.9 % SODIUM CHLORIDE 0.9 %
2 VIAL (ML) INJECTION EVERY 12 HOURS SCHEDULED
Status: DISCONTINUED | OUTPATIENT
Start: 2025-07-15 | End: 2025-07-16 | Stop reason: HOSPADM

## 2025-07-15 RX ORDER — ALFUZOSIN HYDROCHLORIDE 10 MG/1
10 TABLET, EXTENDED RELEASE ORAL AT BEDTIME
COMMUNITY

## 2025-07-15 RX ORDER — ONDANSETRON 2 MG/ML
4 INJECTION INTRAMUSCULAR; INTRAVENOUS EVERY 12 HOURS PRN
Status: DISCONTINUED | OUTPATIENT
Start: 2025-07-15 | End: 2025-07-16 | Stop reason: HOSPADM

## 2025-07-15 RX ORDER — POLYETHYLENE GLYCOL 3350 17 G/17G
17 POWDER, FOR SOLUTION ORAL DAILY PRN
Status: DISCONTINUED | OUTPATIENT
Start: 2025-07-15 | End: 2025-07-16 | Stop reason: HOSPADM

## 2025-07-15 RX ORDER — PROCHLORPERAZINE MALEATE 5 MG/1
5 TABLET ORAL EVERY 4 HOURS PRN
Status: DISCONTINUED | OUTPATIENT
Start: 2025-07-15 | End: 2025-07-16 | Stop reason: HOSPADM

## 2025-07-15 RX ORDER — ACETAMINOPHEN 650 MG/1
650 SUPPOSITORY RECTAL EVERY 4 HOURS PRN
Status: DISCONTINUED | OUTPATIENT
Start: 2025-07-15 | End: 2025-07-16 | Stop reason: HOSPADM

## 2025-07-15 RX ORDER — HYDROCODONE BITARTRATE AND ACETAMINOPHEN 10; 325 MG/1; MG/1
1 TABLET ORAL EVERY 6 HOURS PRN
Status: DISCONTINUED | OUTPATIENT
Start: 2025-07-15 | End: 2025-07-16 | Stop reason: HOSPADM

## 2025-07-15 RX ORDER — ATORVASTATIN CALCIUM 80 MG/1
80 TABLET, FILM COATED ORAL DAILY
Status: DISCONTINUED | OUTPATIENT
Start: 2025-07-16 | End: 2025-07-16 | Stop reason: HOSPADM

## 2025-07-15 RX ORDER — ENOXAPARIN SODIUM 100 MG/ML
40 INJECTION SUBCUTANEOUS NIGHTLY
Status: DISCONTINUED | OUTPATIENT
Start: 2025-07-15 | End: 2025-07-15

## 2025-07-15 RX ORDER — PROCHLORPERAZINE EDISYLATE 5 MG/ML
5 INJECTION INTRAMUSCULAR; INTRAVENOUS EVERY 4 HOURS PRN
Status: DISCONTINUED | OUTPATIENT
Start: 2025-07-15 | End: 2025-07-16 | Stop reason: HOSPADM

## 2025-07-15 RX ORDER — CYCLOBENZAPRINE HCL 5 MG
5 TABLET ORAL 3 TIMES DAILY
Status: DISCONTINUED | OUTPATIENT
Start: 2025-07-15 | End: 2025-07-16 | Stop reason: HOSPADM

## 2025-07-15 RX ORDER — ACETAMINOPHEN 325 MG/1
650 TABLET ORAL EVERY 4 HOURS PRN
Status: DISCONTINUED | OUTPATIENT
Start: 2025-07-15 | End: 2025-07-16 | Stop reason: HOSPADM

## 2025-07-15 RX ORDER — AMLODIPINE BESYLATE 5 MG/1
5 TABLET ORAL EVERY MORNING
Status: DISCONTINUED | OUTPATIENT
Start: 2025-07-16 | End: 2025-07-16 | Stop reason: HOSPADM

## 2025-07-15 RX ADMIN — CYCLOBENZAPRINE HYDROCHLORIDE 5 MG: 5 TABLET, FILM COATED ORAL at 20:23

## 2025-07-15 RX ADMIN — MORPHINE SULFATE 2 MG: 2 INJECTION, SOLUTION INTRAMUSCULAR; INTRAVENOUS at 09:59

## 2025-07-15 RX ADMIN — WARFARIN SODIUM 2.5 MG: 2.5 TABLET ORAL at 21:56

## 2025-07-15 RX ADMIN — SODIUM CHLORIDE, PRESERVATIVE FREE 2 ML: 5 INJECTION INTRAVENOUS at 20:24

## 2025-07-15 RX ADMIN — MORPHINE SULFATE 4 MG: 4 INJECTION INTRAVENOUS at 15:58

## 2025-07-15 RX ADMIN — CYCLOBENZAPRINE HYDROCHLORIDE 5 MG: 5 TABLET, FILM COATED ORAL at 15:51

## 2025-07-15 RX ADMIN — MORPHINE SULFATE 4 MG: 4 INJECTION INTRAVENOUS at 20:36

## 2025-07-15 RX ADMIN — LIDOCAINE 1 PATCH: 4 PATCH TOPICAL at 15:52

## 2025-07-15 RX ADMIN — ASPIRIN 81 MG: 81 TABLET, COATED ORAL at 20:36

## 2025-07-15 RX ADMIN — HYDROCODONE BITARTRATE AND ACETAMINOPHEN 1 TABLET: 10; 325 TABLET ORAL at 18:42

## 2025-07-15 RX ADMIN — MORPHINE SULFATE 4 MG: 4 INJECTION INTRAVENOUS at 12:01

## 2025-07-15 RX ADMIN — SODIUM CHLORIDE, PRESERVATIVE FREE 2 ML: 5 INJECTION INTRAVENOUS at 15:52

## 2025-07-15 SDOH — SOCIAL STABILITY: SOCIAL INSECURITY: HOW OFTEN DOES ANYONE, INCLUDING FAMILY AND FRIENDS, INSULT OR TALK DOWN TO YOU?: NEVER

## 2025-07-15 SDOH — ECONOMIC STABILITY: HOUSING INSECURITY: DO YOU HAVE PROBLEMS WITH ANY OF THE FOLLOWING?: NONE OF THE ABOVE

## 2025-07-15 SDOH — HEALTH STABILITY: PHYSICAL HEALTH: DO YOU HAVE SERIOUS DIFFICULTY WALKING OR CLIMBING STAIRS?: NO

## 2025-07-15 SDOH — ECONOMIC STABILITY: HOUSING INSECURITY: WHAT IS YOUR LIVING SITUATION TODAY?: I HAVE A STEADY PLACE TO LIVE

## 2025-07-15 SDOH — SOCIAL STABILITY: SOCIAL INSECURITY: HOW OFTEN DOES ANYONE, INCLUDING FAMILY AND FRIENDS, THREATEN YOU WITH HARM?: NEVER

## 2025-07-15 SDOH — HEALTH STABILITY: PHYSICAL HEALTH: DO YOU HAVE DIFFICULTY DRESSING OR BATHING?: NO

## 2025-07-15 SDOH — HEALTH STABILITY: GENERAL: BECAUSE OF A PHYSICAL, MENTAL, OR EMOTIONAL CONDITION, DO YOU HAVE DIFFICULTY DOING ERRANDS ALONE?: NO

## 2025-07-15 SDOH — ECONOMIC STABILITY: FOOD INSECURITY: WITHIN THE PAST 12 MONTHS, THE FOOD YOU BOUGHT JUST DIDN'T LAST AND YOU DIDN'T HAVE MONEY TO GET MORE.: NEVER TRUE

## 2025-07-15 SDOH — SOCIAL STABILITY: SOCIAL INSECURITY: HOW OFTEN DOES ANYONE, INCLUDING FAMILY AND FRIENDS, SCREAM OR CURSE AT YOU?: NEVER

## 2025-07-15 SDOH — ECONOMIC STABILITY: INCOME INSECURITY: IN THE PAST 12 MONTHS, HAS THE ELECTRIC, GAS, OIL, OR WATER COMPANY THREATENED TO SHUT OFF SERVICE IN YOUR HOME?: NO

## 2025-07-15 SDOH — ECONOMIC STABILITY: TRANSPORTATION INSECURITY
IN THE PAST 12 MONTHS, HAS LACK OF RELIABLE TRANSPORTATION KEPT YOU FROM MEDICAL APPOINTMENTS, MEETINGS, WORK OR FROM GETTING THINGS NEEDED FOR DAILY LIVING?: NO

## 2025-07-15 SDOH — SOCIAL STABILITY: SOCIAL INSECURITY: HOW OFTEN DOES ANYONE, INCLUDING FAMILY AND FRIENDS, PHYSICALLY HURT YOU?: NEVER

## 2025-07-15 SDOH — SOCIAL STABILITY: SOCIAL NETWORK
HOW OFTEN DO YOU SEE OR TALK TO PEOPLE THAT YOU CARE ABOUT AND FEEL CLOSE TO? (FOR EXAMPLE: TALKING TO FRIENDS ON THE PHONE, VISITING FRIENDS OR FAMILY, GOING TO CHURCH OR CLUB MEETINGS): 5 OR MORE TIMES A WEEK

## 2025-07-15 SDOH — ECONOMIC STABILITY: HOUSING INSECURITY: WHAT IS YOUR LIVING SITUATION TODAY?: OTHER (COMMENT)

## 2025-07-15 SDOH — ECONOMIC STABILITY: GENERAL

## 2025-07-15 SDOH — SOCIAL STABILITY: SOCIAL NETWORK: SUPPORT SYSTEMS: FAMILY MEMBERS

## 2025-07-15 SDOH — ECONOMIC STABILITY: HOUSING INSECURITY: WHAT IS YOUR LIVING SITUATION TODAY?: SPOUSE

## 2025-07-15 ASSESSMENT — PAIN SCALES - GENERAL
PAINLEVEL_OUTOF10: 6
PAINLEVEL_OUTOF10: 5
PAINLEVEL_OUTOF10: 10
PAINLEVEL_OUTOF10: 4
PAINLEVEL_OUTOF10: 8
PAINLEVEL_OUTOF10: 8

## 2025-07-15 ASSESSMENT — ENCOUNTER SYMPTOMS
ENDOCRINE NEGATIVE: 1
NEUROLOGICAL NEGATIVE: 1
GASTROINTESTINAL NEGATIVE: 1
RESPIRATORY NEGATIVE: 1
ALLERGIC/IMMUNOLOGIC NEGATIVE: 1
PSYCHIATRIC NEGATIVE: 1
CONSTITUTIONAL NEGATIVE: 1
EYES NEGATIVE: 1
HEMATOLOGIC/LYMPHATIC NEGATIVE: 1

## 2025-07-15 ASSESSMENT — PATIENT HEALTH QUESTIONNAIRE - PHQ9
IS PATIENT ABLE TO COMPLETE PHQ2 OR PHQ9: YES
1. LITTLE INTEREST OR PLEASURE IN DOING THINGS: NOT AT ALL
SUM OF ALL RESPONSES TO PHQ9 QUESTIONS 1 AND 2: 0
2. FEELING DOWN, DEPRESSED OR HOPELESS: NOT AT ALL
SUM OF ALL RESPONSES TO PHQ9 QUESTIONS 1 AND 2: 0
CLINICAL INTERPRETATION OF PHQ2 SCORE: NO FURTHER SCREENING NEEDED

## 2025-07-15 ASSESSMENT — ACTIVITIES OF DAILY LIVING (ADL)
ADL_SHORT_OF_BREATH: NO
ADL_BEFORE_ADMISSION: INDEPENDENT
ADL_SCORE: 12
RECENT_DECLINE_ADL: NO

## 2025-07-15 ASSESSMENT — ORIENTATION MEMORY CONCENTRATION TEST (OMCT)
OMCT SCORE: 2
WHAT MONTH IS IT NOW: CORRECT
WHAT TIME IS IT (NO WATCH OR CLOCK): CORRECT
COUNT BACKWARDS FROM 20 TO 1: CORRECT
OMCT INTERPRETATION: 0-6: NO SIGNIFICANT IMPAIRMENT
REPEAT THE NAME AND ADDRESS I ASKED YOU TO REMEMBER: CORRECT
WHAT YEAR IS IT NOW (MUST BE EXACT): CORRECT
SAY THE MONTHS IN REVERSE ORDER STARTING WITH LAST MONTH: 1 ERROR

## 2025-07-15 ASSESSMENT — COLUMBIA-SUICIDE SEVERITY RATING SCALE - C-SSRS: IS THE PATIENT ABLE TO COMPLETE C-SSRS: YES

## 2025-07-16 ENCOUNTER — APPOINTMENT (OUTPATIENT)
Dept: FAMILY MEDICINE | Age: 89
End: 2025-07-16

## 2025-07-16 VITALS
SYSTOLIC BLOOD PRESSURE: 143 MMHG | HEART RATE: 67 BPM | BODY MASS INDEX: 31.88 KG/M2 | DIASTOLIC BLOOD PRESSURE: 79 MMHG | RESPIRATION RATE: 16 BRPM | OXYGEN SATURATION: 91 % | HEIGHT: 64 IN | TEMPERATURE: 98.1 F | WEIGHT: 186.73 LBS

## 2025-07-16 LAB
ANION GAP SERPL CALC-SCNC: 7 MMOL/L (ref 7–19)
BUN SERPL-MCNC: 22 MG/DL (ref 6–20)
BUN/CREAT SERPL: 22 (ref 7–25)
CALCIUM SERPL-MCNC: 9.3 MG/DL (ref 8.4–10.2)
CHLORIDE SERPL-SCNC: 105 MMOL/L (ref 97–110)
CO2 SERPL-SCNC: 26 MMOL/L (ref 21–32)
CREAT SERPL-MCNC: 1 MG/DL (ref 0.67–1.17)
DEPRECATED RDW RBC: 46.8 FL (ref 39–50)
EGFRCR SERPLBLD CKD-EPI 2021: 72 ML/MIN/{1.73_M2}
ERYTHROCYTE [DISTWIDTH] IN BLOOD: 13.1 % (ref 11–15)
FASTING DURATION TIME PATIENT: ABNORMAL H
GLUCOSE SERPL-MCNC: 128 MG/DL (ref 70–99)
HCT VFR BLD CALC: 37.9 % (ref 39–51)
HGB BLD-MCNC: 12.1 G/DL (ref 13–17)
INR PPP: 2.5
MCH RBC QN AUTO: 31.3 PG (ref 26–34)
MCHC RBC AUTO-ENTMCNC: 31.9 G/DL (ref 32–36.5)
MCV RBC AUTO: 97.9 FL (ref 78–100)
NRBC BLD MANUAL-RTO: 0 /100 WBC
PLATELET # BLD AUTO: 255 K/MCL (ref 140–450)
POTASSIUM SERPL-SCNC: 4.2 MMOL/L (ref 3.4–5.1)
PROTHROMBIN TIME: 24.4 SEC (ref 9.7–11.8)
RBC # BLD: 3.87 MIL/MCL (ref 4.5–5.9)
SODIUM SERPL-SCNC: 134 MMOL/L (ref 135–145)
WBC # BLD: 8.3 K/MCL (ref 4.2–11)

## 2025-07-16 PROCEDURE — G0378 HOSPITAL OBSERVATION PER HR: HCPCS

## 2025-07-16 PROCEDURE — 85610 PROTHROMBIN TIME: CPT | Performed by: INTERNAL MEDICINE

## 2025-07-16 PROCEDURE — A4216 STERILE WATER/SALINE, 10 ML: HCPCS | Performed by: INTERNAL MEDICINE

## 2025-07-16 PROCEDURE — 80048 BASIC METABOLIC PNL TOTAL CA: CPT | Performed by: INTERNAL MEDICINE

## 2025-07-16 PROCEDURE — 85027 COMPLETE CBC AUTOMATED: CPT | Performed by: INTERNAL MEDICINE

## 2025-07-16 PROCEDURE — 99239 HOSP IP/OBS DSCHRG MGMT >30: CPT | Performed by: INTERNAL MEDICINE

## 2025-07-16 PROCEDURE — 10002800 HB RX 250 W HCPCS: Performed by: INTERNAL MEDICINE

## 2025-07-16 PROCEDURE — 10002803 HB RX 637: Performed by: INTERNAL MEDICINE

## 2025-07-16 PROCEDURE — 36415 COLL VENOUS BLD VENIPUNCTURE: CPT | Performed by: INTERNAL MEDICINE

## 2025-07-16 PROCEDURE — 10004651 HB RX, NO CHARGE ITEM: Performed by: INTERNAL MEDICINE

## 2025-07-16 PROCEDURE — 96376 TX/PRO/DX INJ SAME DRUG ADON: CPT

## 2025-07-16 RX ORDER — CYCLOBENZAPRINE HCL 5 MG
5 TABLET ORAL 3 TIMES DAILY
Qty: 42 TABLET | Refills: 0 | Status: SHIPPED | OUTPATIENT
Start: 2025-07-16 | End: 2025-07-30

## 2025-07-16 RX ORDER — HYDROCODONE BITARTRATE AND ACETAMINOPHEN 10; 325 MG/1; MG/1
1 TABLET ORAL EVERY 6 HOURS PRN
Qty: 25 TABLET | Refills: 0 | Status: SHIPPED | OUTPATIENT
Start: 2025-07-16

## 2025-07-16 RX ORDER — POLYETHYLENE GLYCOL 3350 17 G/17G
17 POWDER, FOR SOLUTION ORAL DAILY PRN
Status: SHIPPED | COMMUNITY
Start: 2025-07-16

## 2025-07-16 RX ORDER — WARFARIN SODIUM 2.5 MG/1
2.5 TABLET ORAL ONCE
Status: DISCONTINUED | OUTPATIENT
Start: 2025-07-16 | End: 2025-07-16 | Stop reason: HOSPADM

## 2025-07-16 RX ORDER — LIDOCAINE 4 G/G
1 PATCH TOPICAL DAILY
Qty: 14 PATCH | Refills: 0 | Status: SHIPPED | OUTPATIENT
Start: 2025-07-16

## 2025-07-16 RX ADMIN — METOPROLOL SUCCINATE 25 MG: 25 TABLET, EXTENDED RELEASE ORAL at 08:14

## 2025-07-16 RX ADMIN — SODIUM CHLORIDE, PRESERVATIVE FREE 2 ML: 5 INJECTION INTRAVENOUS at 08:14

## 2025-07-16 RX ADMIN — HYDROCODONE BITARTRATE AND ACETAMINOPHEN 1 TABLET: 10; 325 TABLET ORAL at 08:19

## 2025-07-16 RX ADMIN — MORPHINE SULFATE 4 MG: 4 INJECTION INTRAVENOUS at 06:00

## 2025-07-16 RX ADMIN — AMLODIPINE BESYLATE 5 MG: 5 TABLET ORAL at 06:00

## 2025-07-16 RX ADMIN — LOSARTAN POTASSIUM 25 MG: 25 TABLET, FILM COATED ORAL at 08:14

## 2025-07-16 ASSESSMENT — PAIN SCALES - GENERAL
PAINLEVEL_OUTOF10: 0
PAINLEVEL_OUTOF10: 8
PAINLEVEL_OUTOF10: 7

## 2025-07-22 ENCOUNTER — APPOINTMENT (OUTPATIENT)
Dept: FAMILY MEDICINE | Age: 89
End: 2025-07-22

## 2025-07-25 ENCOUNTER — APPOINTMENT (OUTPATIENT)
Dept: FAMILY MEDICINE | Age: 89
End: 2025-07-25

## 2025-07-30 ENCOUNTER — APPOINTMENT (OUTPATIENT)
Dept: FAMILY MEDICINE | Age: 89
End: 2025-07-30

## 2025-07-30 VITALS
DIASTOLIC BLOOD PRESSURE: 70 MMHG | SYSTOLIC BLOOD PRESSURE: 130 MMHG | OXYGEN SATURATION: 97 % | BODY MASS INDEX: 31.41 KG/M2 | HEART RATE: 74 BPM | TEMPERATURE: 97.2 F | RESPIRATION RATE: 18 BRPM | HEIGHT: 64 IN | WEIGHT: 184 LBS

## 2025-07-30 DIAGNOSIS — S22.42XA CLOSED FRACTURE OF MULTIPLE RIBS OF LEFT SIDE, INITIAL ENCOUNTER: ICD-10-CM

## 2025-07-30 DIAGNOSIS — E78.2 HYPERLIPEMIA, MIXED: ICD-10-CM

## 2025-07-30 DIAGNOSIS — I10 BENIGN ESSENTIAL HYPERTENSION: ICD-10-CM

## 2025-07-30 DIAGNOSIS — I48.0 PAROXYSMAL ATRIAL FIBRILLATION  (CMD): ICD-10-CM

## 2025-07-30 DIAGNOSIS — Z78.9 TRANSITION OF CARE: Primary | ICD-10-CM

## 2025-07-30 DIAGNOSIS — E66.811 OBESITY (BMI 30.0-34.9): ICD-10-CM

## 2025-07-30 DIAGNOSIS — Z95.1 HX OF CABG: ICD-10-CM

## 2025-07-30 RX ORDER — DULOXETIN HYDROCHLORIDE 20 MG/1
20 CAPSULE, DELAYED RELEASE ORAL DAILY
COMMUNITY
End: 2025-07-30 | Stop reason: ALTCHOICE

## 2025-07-30 RX ORDER — GABAPENTIN 100 MG/1
100 CAPSULE ORAL
COMMUNITY

## 2025-07-30 RX ORDER — LEVOFLOXACIN 500 MG/1
500 TABLET, FILM COATED ORAL DAILY
COMMUNITY
Start: 2025-06-09

## 2025-07-30 ASSESSMENT — ENCOUNTER SYMPTOMS
CHILLS: 0
COUGH: 0
HEADACHES: 0
FEVER: 0
SHORTNESS OF BREATH: 0
FATIGUE: 0
DIZZINESS: 0
APPETITE CHANGE: 0
UNEXPECTED WEIGHT CHANGE: 0

## 2025-07-30 ASSESSMENT — PATIENT HEALTH QUESTIONNAIRE - PHQ9
2. FEELING DOWN, DEPRESSED OR HOPELESS: NOT AT ALL
SUM OF ALL RESPONSES TO PHQ9 QUESTIONS 1 AND 2: 0
CLINICAL INTERPRETATION OF PHQ2 SCORE: NO FURTHER SCREENING NEEDED
1. LITTLE INTEREST OR PLEASURE IN DOING THINGS: NOT AT ALL
SUM OF ALL RESPONSES TO PHQ9 QUESTIONS 1 AND 2: 0

## 2025-07-30 ASSESSMENT — PAIN SCALES - GENERAL: PAINLEVEL_OUTOF10: 0

## 2025-10-16 ENCOUNTER — APPOINTMENT (OUTPATIENT)
Dept: FAMILY MEDICINE | Age: 89
End: 2025-10-16

## (undated) DEVICE — SHEATH 6FR 10CM 2.5CM .035IN INTRO SNAP ON DIL LOCK KINK RST

## (undated) DEVICE — Device

## (undated) DEVICE — #15 STERILE STAINLESS BLADE: Brand: STERILE STAINLESS BLADES

## (undated) DEVICE — ANTIBACTERIAL UNDYED BRAIDED (POLYGLACTIN 910), SYNTHETIC ABSORBABLE SUTURE: Brand: COATED VICRYL

## (undated) DEVICE — WRAP CLD THER COMPR UNIV BLK SHLDR FOAM REUSE

## (undated) DEVICE — INHANCE SHOULDER SYSTEM HUMERAL PIN DIAMETER 3.5MM X 130MM: Brand: INHANCE

## (undated) DEVICE — COVER PROBE FLX-FEEL 58X6IN OR 4 FLAG 2 SHT 24 PRINT STRL LF

## (undated) DEVICE — GLOVE SUR 8 SENSICARE PI PIP CRM PWD F

## (undated) DEVICE — DRAPE,TOWEL,LARGE,INVISISHIELD: Brand: MEDLINE

## (undated) DEVICE — COVER EQUIPMENT SNAP KAPS 22IN DOME RND LF

## (undated) DEVICE — STRYKER PERFORMANCE SERIES SAGITTAL BLADE: Brand: STRYKER PERFORMANCE SERIES

## (undated) DEVICE — GUIDEWIRE RUNTHROUGH 3CM 180CM .36MM VASC RADOPQ X FLPY STRL

## (undated) DEVICE — DRESSING GAUZE 1.5X1.5IN HMST QUIKCLOT

## (undated) DEVICE — GUIDEWIRE STRT 10CM 260CM J CRV TPR .035IN VASC PTFE PERIPH

## (undated) DEVICE — SUT MCRYL 3-0 27IN ABSRB UD 19MM PS-2 3/8

## (undated) DEVICE — 450 ML BOTTLE OF 0.05% CHLORHEXIDINE GLUCONATE IN 99.95% STERILE WATER FOR IRRIGATION, USP AND APPLICATOR.: Brand: IRRISEPT ANTIMICROBIAL WOUND LAVAGE

## (undated) DEVICE — SET XTN 3ML 20IN MED STD BORE 2 MALE LL PINCH CLAMP DEHP-FR

## (undated) DEVICE — GUIDEWIRE LAUREATE ANG STD .035IN 180CM ANGIO FX CORE RADOPQ

## (undated) DEVICE — ATTUNE PINNING SYSTEM: Brand: ATTUNE

## (undated) DEVICE — VALVE GUARDIAN 2 VSI HEMOSTASIS 8FR GW INS TOOL ROTOLOCK

## (undated) DEVICE — SET INFUSION 25ML 117IN 20 GTT 2 MALE LL 2 NDL FREE VLV

## (undated) DEVICE — SUT COAT VCRL 0 27IN CP-1 ABSRB UD 36MM 1/2

## (undated) DEVICE — CATHETER BLN NC EMERGE MNRL 3.5MM 12MM 143CM 2 LUM TPR TIP

## (undated) DEVICE — INHANCE SHOULDER SYSTEM PERIPHERAL SCREW DRILL Ø3.2MM X 250MM: Brand: INHANCE

## (undated) DEVICE — PACK PBDS ORTHO MODULE

## (undated) DEVICE — HANDPIECE SET WITH HIGH FLOW TIP AND SUCTION TUBE: Brand: INTERPULSE

## (undated) DEVICE — GOWN SURG XL L4 IMPRV REINFORCE SET IN SLV STRL LF DISP BLUE

## (undated) DEVICE — 3M™ STERI-STRIP™ REINFORCED ADHESIVE SKIN CLOSURES, R1547, 1/2 IN X 4 IN (12 MM X 100 MM), 6 STRIPS/ENVELOPE: Brand: 3M™ STERI-STRIP™

## (undated) DEVICE — SLEEVE COMPR MD KNEE LEN SGL USE KENDALL SCD

## (undated) DEVICE — CATHETER 6FR PGTL FL4 FR4 CRV 100110CM FULL LGTH WIRE BRAID

## (undated) DEVICE — SET XTN 6ML 41IN 7IN 32IN LONG 2 NDL FREE VLV FX MALE LL STD

## (undated) DEVICE — CATHETER 6FR IM CRV 100CM FULL LGTH WIRE BRAID ROBUST SHAFT

## (undated) DEVICE — 3M™ MICROFOAM™ TAPE 1528-4: Brand: 3M™ MICROFOAM™

## (undated) DEVICE — SHEET TRNSF 1960X760MM 480MM MAXITUBE FLITES PRM TUBE LF

## (undated) DEVICE — SYRINGE 8ML ROTATE MALE LL ADPR RING GRIP MED INJECT8 CCS

## (undated) DEVICE — DRESSING TRANS 4.75X4IN ADH HPOAL WTPRF TEGADERM PU STD STRL

## (undated) DEVICE — CATHETER US EE PLATINUM 3.3-2.9FR 20MM 150CM 24CM 20MHZ RX

## (undated) DEVICE — INHANCE SHOULDER SYSTEM SIZER HANDLE: Brand: INHANCE

## (undated) DEVICE — ADAPTER TBG 2 MALE LL DEHP-FR STRL LF MEDEX 1IN DISP .1ML IV

## (undated) DEVICE — DECANTER FLUID DSPNSR BAG BAJ DISP STRL LF ASEPTIC TRNSF

## (undated) DEVICE — DRAPE,U/SHT,SPLIT,FILM,60X84,STERILE: Brand: MEDLINE

## (undated) DEVICE — SUT FBRWR 2 38IN N ABSRB BLU L36.6MM 1/2

## (undated) DEVICE — INTENDED TO AID IN THE PASSING OF SUTURES THROUGH BONE AND SOFT TISSUE DURING ORTHOPEDIC SURGERY: Brand: HOFFEE SUTURE RETRIEVER

## (undated) DEVICE — SOLUTION IRRIG 1000ML 0.9% NACL USP BTL

## (undated) DEVICE — DRESSING AQUACEL W/ SILVER 3.5 X 10 IN

## (undated) DEVICE — GLOVE SURG 7 PROTEXIS LF CRM PF BEAD CUFF STRL PLISPRN 12IN

## (undated) DEVICE — SLIM BODY SKIN STAPLER: Brand: APPOSE ULC

## (undated) DEVICE — GLOVE SURG 7.5 PROTEXIS LF CRM PF SMTH BEAD CUFF STRL

## (undated) DEVICE — DEVICE GTWY ENCORE ADV 20ML KIT TRQ GW INTRO INFL 20MM 3MM

## (undated) DEVICE — GUIDEWIRE 150CM 3MM RDS J CRV .035IN VASC PTFE FX CORE LF

## (undated) DEVICE — KIT MICROINTRODUCER 4FR .018IN 40CM 7CM .018IN SFTP MNDRL

## (undated) DEVICE — KIT BEACH CHR FOAM W/ SUPP ARM DRP

## (undated) DEVICE — ELECTRODE DFBR UNV 15.2X10.8CM ADH PAD RDTRNS POUCH PADPRO

## (undated) DEVICE — BAG WST 48IN SPK FLTR RLLR CLAMP FEMALE LL TUBE VENT MERIT

## (undated) DEVICE — GLOVE SUR 7.5 SENSICARE PI PIP CRM PWD F

## (undated) DEVICE — GAUZE,SPONGE,USP,4"X4",12PLY,STRL,10/PK: Brand: MEDLINE INDUSTRIES, INC.

## (undated) DEVICE — GOWN,SIRUS,FABRNF,XL,20/CS: Brand: MEDLINE

## (undated) NOTE — LETTER
AUTHORIZATION FOR SURGICAL OPERATION OR OTHER PROCEDURE    1.  I hereby authorize Dr. Charles Sanabria, and [facility] staff assigned to my case to perform the following operation and/or procedure at the [facility]:    ________________________________ Time:  ________ A. M.  P.M.        Patient Name:  ______________________________________________________  (please print)      Patient signature:  ___________________________________________________             Relationship to Patient:

## (undated) NOTE — LETTER
AUTHORIZATION FOR SURGICAL OPERATION OR OTHER PROCEDURE    1.  I hereby authorize Dr. Joaquin Rios, and [facility] staff assigned to my case to perform the following operation and/or procedure at the [facility]:    ________________________________ ________ A. M.  P.M.        Patient Name:  ______________________________________________________  (please print)      Patient signature:  ___________________________________________________             Relationship to Patient:           []  Parent    Respon

## (undated) NOTE — LETTER
AUTHORIZATION FOR SURGICAL OPERATION OR OTHER PROCEDURE    1.  I hereby authorize Dr. Guillermina Mcguire, and [facility] staff assigned to my case to perform the following operation and/or procedure at the [facility]:    ________________________________ Time:  ________ A. M.  P.M.        Patient Name:  ______________________________________________________  (please print)      Patient signature:  ___________________________________________________             Relationship to Patient: